# Patient Record
Sex: MALE | Race: BLACK OR AFRICAN AMERICAN | NOT HISPANIC OR LATINO | URBAN - METROPOLITAN AREA
[De-identification: names, ages, dates, MRNs, and addresses within clinical notes are randomized per-mention and may not be internally consistent; named-entity substitution may affect disease eponyms.]

---

## 2017-08-13 ENCOUNTER — EMERGENCY (EMERGENCY)
Facility: HOSPITAL | Age: 52
LOS: 0 days | Discharge: HOME | End: 2017-08-14
Attending: EMERGENCY MEDICINE
Payer: MEDICAID

## 2017-08-13 VITALS
WEIGHT: 240.08 LBS | TEMPERATURE: 98 F | HEIGHT: 71 IN | OXYGEN SATURATION: 100 % | HEART RATE: 71 BPM | RESPIRATION RATE: 17 BRPM | SYSTOLIC BLOOD PRESSURE: 166 MMHG | DIASTOLIC BLOOD PRESSURE: 94 MMHG

## 2017-08-13 PROCEDURE — 99282 EMERGENCY DEPT VISIT SF MDM: CPT | Mod: 25

## 2017-08-13 NOTE — ED ADULT TRIAGE NOTE - CHIEF COMPLAINT QUOTE
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

## 2017-08-14 RX ORDER — OXYMETAZOLINE HYDROCHLORIDE 0.5 MG/ML
1 SPRAY NASAL ONCE
Qty: 0 | Refills: 0 | Status: COMPLETED | OUTPATIENT
Start: 2017-08-14 | End: 2017-08-14

## 2017-08-14 RX ORDER — LORATADINE 10 MG/1
1 TABLET ORAL
Qty: 5 | Refills: 0 | OUTPATIENT
Start: 2017-08-14

## 2017-08-14 RX ORDER — LORATADINE 10 MG/1
10 TABLET ORAL ONCE
Qty: 0 | Refills: 0 | Status: COMPLETED | OUTPATIENT
Start: 2017-08-14 | End: 2017-08-14

## 2017-08-14 RX ADMIN — OXYMETAZOLINE HYDROCHLORIDE 1 SPRAY(S): 0.5 SPRAY NASAL at 00:53

## 2017-08-14 RX ADMIN — LORATADINE 10 MILLIGRAM(S): 10 TABLET ORAL at 00:52

## 2017-08-14 NOTE — ED PROVIDER NOTE - MEDICAL DECISION MAKING DETAILS
Will treat for viral URI, sinus congestion.  VSS.  Supportive care with fluids, decongestants for now.  No antibiotics since strong suspicion for viral, patient well appearing, and symptoms for <2 days.  Discussed results and outcome of today's visit with the patient.  Patient advised to please follow up with their primary care doctor within the next 24 hours and return to the Emergency Department for worsening symptoms or any other concerns.  Patient advised that their doctor may call  to follow up on the specific results of the tests performed today in the emergency department.   Patient appears well on discharge.  Patient given prescription medications for their condition and advised to take them as prescribed and check with their Primary Care Provider if any questions arise.

## 2017-08-14 NOTE — ED PROVIDER NOTE - OBJECTIVE STATEMENT
Pertinent PMH/PSH/FHx/SHx and Review of Systems contained within:  Patient presents to the ED for sinus congestion and runny nose x2 days.  Well appearing, denies sore throat, cough, shortness of breath.  Having trouble laying flat because "its hard to breathe through my nose!"  Works in health care, has frequent sick contacts.    Relevant PMHx/SHx/SOCHx/FAMH:  HTN  +Occasional Smoker, denies use of other illict drugs or h/o alchol abuse  PMD: can't recall name    ROS: No fever/chills, No headache/photophobia/eye pain/changes in vision, No ear pain/sore throat/dysphagia, No chest pain/palpitations, no SOB/cough/wheeze/stridor, No abdominal pain, No N/V/D/melena, no dysuria/frequency/discharge, No neck/back pain, no rash, no changes in neurological status/function.

## 2017-08-15 DIAGNOSIS — R05 COUGH: ICD-10-CM

## 2017-08-15 DIAGNOSIS — Z20.89 CONTACT WITH AND (SUSPECTED) EXPOSURE TO OTHER COMMUNICABLE DISEASES: ICD-10-CM

## 2017-08-15 DIAGNOSIS — R09.81 NASAL CONGESTION: ICD-10-CM

## 2018-02-14 ENCOUNTER — EMERGENCY (EMERGENCY)
Facility: HOSPITAL | Age: 53
LOS: 0 days | Discharge: HOME | End: 2018-02-14
Attending: EMERGENCY MEDICINE
Payer: MEDICAID

## 2018-02-14 VITALS
OXYGEN SATURATION: 99 % | DIASTOLIC BLOOD PRESSURE: 74 MMHG | HEART RATE: 82 BPM | SYSTOLIC BLOOD PRESSURE: 162 MMHG | HEIGHT: 71 IN | TEMPERATURE: 102 F | WEIGHT: 240.08 LBS | RESPIRATION RATE: 18 BRPM

## 2018-02-14 VITALS
TEMPERATURE: 99 F | HEART RATE: 84 BPM | SYSTOLIC BLOOD PRESSURE: 144 MMHG | DIASTOLIC BLOOD PRESSURE: 84 MMHG | OXYGEN SATURATION: 99 % | RESPIRATION RATE: 18 BRPM

## 2018-02-14 DIAGNOSIS — I10 ESSENTIAL (PRIMARY) HYPERTENSION: ICD-10-CM

## 2018-02-14 DIAGNOSIS — J11.1 INFLUENZA DUE TO UNIDENTIFIED INFLUENZA VIRUS WITH OTHER RESPIRATORY MANIFESTATIONS: ICD-10-CM

## 2018-02-14 DIAGNOSIS — R05 COUGH: ICD-10-CM

## 2018-02-14 LAB
FLUAV SPEC QL CULT: NEGATIVE — SIGNIFICANT CHANGE UP
FLUBV AG SPEC QL IA: POSITIVE

## 2018-02-14 PROCEDURE — 71046 X-RAY EXAM CHEST 2 VIEWS: CPT | Mod: 26

## 2018-02-14 PROCEDURE — 99284 EMERGENCY DEPT VISIT MOD MDM: CPT

## 2018-02-14 RX ORDER — IBUPROFEN 200 MG
1 TABLET ORAL
Qty: 20 | Refills: 0 | OUTPATIENT
Start: 2018-02-14

## 2018-02-14 RX ORDER — IBUPROFEN 200 MG
600 TABLET ORAL ONCE
Qty: 0 | Refills: 0 | Status: COMPLETED | OUTPATIENT
Start: 2018-02-14 | End: 2018-02-14

## 2018-02-14 RX ADMIN — Medication 600 MILLIGRAM(S): at 20:57

## 2018-02-14 RX ADMIN — Medication 600 MILLIGRAM(S): at 19:51

## 2018-02-14 RX ADMIN — Medication 75 MILLIGRAM(S): at 19:51

## 2018-02-14 NOTE — ED ADULT NURSE NOTE - OBJECTIVE STATEMENT
received ft c/o fever/chills since monday with body aches and cough nasal congestion lungs clear b/l

## 2018-02-14 NOTE — ED PROVIDER NOTE - PROGRESS NOTE DETAILS
Results reported to patient--+ flu  Pt. reports feeling better after meds  pt. agrees to f/u with primary care outpt.  pt. understands to return to ED if symptoms worsen; will d/c

## 2018-02-14 NOTE — ED PROVIDER NOTE - PHYSICAL EXAMINATION
Vitals: febrile 101.7, otherwise wnl, mild htn  Gen: AAOx3, NAD, sitting comfortably in stretcher  Head: ncat, perrla, eomi b/l  Neck: supple, no lymphadenopathy, no midline deviation  Heart: rrr, no m/r/g  Lungs: CTA b/l, no rales/ronchi/wheezes  Abd: soft, nontender, non-distended, no rebound or guarding  Ext: no clubbing/cyanosis/edema  Neuro: sensation and muscle strength intact b/l, steady gait

## 2018-02-15 LAB
FLUBV RNA SPEC QL NAA+PROBE: DETECTED
RAPID RVP RESULT: DETECTED

## 2019-01-13 ENCOUNTER — EMERGENCY (EMERGENCY)
Facility: HOSPITAL | Age: 54
LOS: 0 days | Discharge: HOME | End: 2019-01-13
Attending: EMERGENCY MEDICINE
Payer: MEDICAID

## 2019-01-13 VITALS
SYSTOLIC BLOOD PRESSURE: 167 MMHG | RESPIRATION RATE: 16 BRPM | OXYGEN SATURATION: 100 % | DIASTOLIC BLOOD PRESSURE: 97 MMHG | HEART RATE: 70 BPM

## 2019-01-13 VITALS
DIASTOLIC BLOOD PRESSURE: 96 MMHG | HEART RATE: 69 BPM | HEIGHT: 71 IN | RESPIRATION RATE: 17 BRPM | OXYGEN SATURATION: 100 % | TEMPERATURE: 98 F | SYSTOLIC BLOOD PRESSURE: 180 MMHG | WEIGHT: 250 LBS

## 2019-01-13 DIAGNOSIS — M10.9 GOUT, UNSPECIFIED: ICD-10-CM

## 2019-01-13 DIAGNOSIS — M25.562 PAIN IN LEFT KNEE: ICD-10-CM

## 2019-01-13 DIAGNOSIS — I10 ESSENTIAL (PRIMARY) HYPERTENSION: ICD-10-CM

## 2019-01-13 PROCEDURE — 99283 EMERGENCY DEPT VISIT LOW MDM: CPT | Mod: 25

## 2019-01-13 PROCEDURE — 73562 X-RAY EXAM OF KNEE 3: CPT | Mod: 26,LT

## 2021-04-30 NOTE — ED ADULT TRIAGE NOTE - MODE OF ARRIVAL
Patient Education   Patient Education     Understanding Rotator Cuff Tendonitis    Tendons are tough tissues that connect muscles to bone. A group of 4 muscles and their tendons form a “cuff” around the head of the upper arm bone. This is called the rotator cuff. It connects the upper arm to the shoulder blade. It gives the shoulder joint stability and strength.  If tendons are injured or strained, they may become irritated and swollen (inflamed). This is called tendonitis. Rotator cuff tendonitis may cause shoulder pain and loss of function.  What causes rotator cuff tendonitis?  Tendonitis results when the rotator cuff tendons are injured or overworked. The most common cause of injury is repetitive overhead activities. These can be work-related activities such as reaching, pushing, or lifting. Or they can be sports-related activities such as throwing, swimming, or lifting weights.  Symptoms of rotator cuff tendonitis  Pain on the side of the upper arm is the most common symptom. Pain may get worse with overhead movements or when you raise the arm above shoulder level. It may also hurt to lie on the shoulder at night.  Treatment for rotator cuff tendonitis  Treatment may include the following:  · Active rest. This lets the rotator cuff heal. Active rest means using your arm and shoulder, but avoiding activities that cause pain, such as reaching overhead or sleeping on the shoulder.  · Cold packs. Putting ice packs on the shoulder helps reduce swelling and relieve pain.  · Pain medicines. Prescription or over-the-counter pain medicines can help relieve pain and swelling.  · Arm and shoulder exercises. These help keep the shoulder joint mobile as it heals. They also help improve the strength of muscles around the joint.  Possible complications  It might be tempting to stop using your shoulder completely to avoid pain. But doing so may lead to a condition called “frozen shoulder.” To help prevent this, following  instructions you are given for active rest and for doing exercises to help your shoulder heal.  When to call your healthcare provider  Call your healthcare provider right away if you have any of these:  · Fever of 100.4°F (38°C) or higher, or as directed  · Symptoms that don’t get better, or get worse  · New symptoms   Date Last Reviewed: 3/10/2016  © 3244-6082 [employer]. [address] [place] [address], [place] [address]. All rights reserved. This information is not intended as a substitute for professional medical care. Always follow your healthcare professional's instructions.           Rotator Cuff Tear  The rotator cuff is a group of muscles and tendons that surround the shoulder joint. These muscles and tendons hold the arm in its joint. They also help the shoulder to rotate. The rotator cuff can be torn from overuse or injury. Gradual wear and tear can lead to inflammation of these tendons. This can progress to gradual or sudden tears.  Symptoms of a torn rotator cuff include:  · Shoulder pain that gets worse when you raise your arm overhead  · Weakness of the shoulder muscles with overhead activity  · Popping and clicking when you move your shoulder  · Shoulder pain that wakes you up at night when sleeping on the hurt shoulder  Diagnosis is made by an MRI or arthroscopy. This is a surgical procedure to look inside the joint through a small tube. Partial rotator cuff tears can be treated by first resting, then strengthening the rotator cuff muscles.  Anti-inflammatory medicines, such as ibuprofen or naproxen, are useful. A limited number of steroid injections can be given. Surgery may be recommended for complete tears and partial tears that do not respond to medical treatment.  Home care  · Avoid activities that make your pain worse. This includes overhead activities, doing the same motion over and over, and heavy lifting.  · You may use over-the-counter pain medicines to control pain, unless another  medicine was prescribed. If you have chronic liver or kidney disease or ever had a stomach ulcer or GI bleeding, talk with your healthcare provider before using these medicines.  · If you were given a sling, use it for comfort. After your pain decreases, don’t keep your arm in the sling all the time. Take your arm out several times a day and move the shoulder joint, as you are able.  · Your healthcare provider may recommend gentle pendulum exercises. Stand or sit with your arm vertical and close to your side. Relax your shoulder muscles and gently swing the arm forward and back, side to side, and in small circles for about 5 minutes. Do this once or twice a day. There should be only slight pain with this exercise.  · You may benefit from physical therapy or a home exercise program to strengthen your shoulder muscles. This will also increase your pain-free range of motion. Applying heat prior to exercises can help prepare the muscles and joint for activity. Talk to your healthcare provider about what is best for your condition.  Follow-up care  Follow up with your healthcare provider, or as advised.  When to seek medical advice  Call your healthcare provider right away if any of the following occur:  · Increasing shoulder pain  · Rapid swelling in the involved shoulder or arm  · Numbness, tingling, or pain radiating down the arm to the hand  · Loss of strength in the affected arm  Date Last Reviewed: 11/23/2015  © 7022-2456 [employer]. [address] [place] [address], [place] [address]. All rights reserved. This information is not intended as a substitute for professional medical care. Always follow your healthcare professional's instructions.            Walk in

## 2021-11-30 NOTE — ED ADULT NURSE NOTE - NS ED NOTE ABUSE RESPONSE YN
Subjective: I injured my LSF.       Objective:     Current level of performance:  ADL: Independent  Work: [employer]   Leisure: Not addressed    Measurements/Tests:  ROM:         N/A         Treatment Provided this day: Fabricated a LSF extension splint pe Yes

## 2023-07-06 NOTE — ED ADULT TRIAGE NOTE - MODE OF ARRIVAL
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           



                                                  tive:           



                                                  [facility]           



                                                  Therapy           



                                                  for            



                                                  Suspected           



                                                  Infection<           



                                                  br>[facility]           



                                                  Therapy           



                                                  Site:           



                                                  Blood<br>D           



                                                  uration of           



                                                  therapy:           



                                                  72 hours           

 

     pantoprazol            Yes            40mg      40 mg,           Univ

ers



     e         3-22                               Oral, BID,           ity of



     (PROTONIX)      01:00:                               First dose           [facility]

[facility]



     EC tablet      00                                 (after           Medical



     40 mg                                         last           Branch



                                                  modificati           



                                                  on) on Tue           



                                                  3/21/23 at           



                                                  2000,           



                                                  Until           



                                                  Discontinu           



                                                  ed,            



                                                  Routine           

 

     ceFEPIme      2023- No             2000mg      2,000 mg,           U

nivers



     (MAXIPIME)      3-21 03-22                          IV             ity of



     2,000 mg in      23:30: 02:42                          [place], [place]



     NaCl 0.9%      00   :00                           ONCE, 1           Medical



     (NS) 100 mL                                         dose, On           [facility]





     MINI-BAG                                         Tue            



                                                  3/21/23 at           



                                                  1830,           



                                                  Administer           



                                                  over 30           



                                                  Minutes,           



                                                  100            



                                                  mL<br>Reas           



                                                  on for           



                                                  Anti-Infec           



                                                  tive:           



                                                  [facility]           



                                                  Therapy           



                                                  for            



                                                  Suspected           



                                                  Infection<           



                                                  br>[facility]           



                                                  Therapy           



                                                  Site:           



                                                  Blood<br>D           



                                                  uration of           



                                                  therapy:           



                                                  72 hours           

 

     thiamine      0      Yes            100mg      100 mg,           Unive

rs



     (VITAMIN      3-21                               Oral,           ity of



     B1) tablet      14:00:                               DAILY,           [place]



     100 mg      00                                 First dose           Medical



                                                  on Tue           Branch



                                                  3/21/23 at           



                                                  0900,           



                                                  Until           



                                                  Discontinu           



                                                  ed,            



                                                  Routine           

 

     magnesium      2023- No             2g        2 g, IV           Univ

ers



     sulfate in      3-21 03-21                          Piggyback,           it

y of



     water 2      10:45: 11:26                          Administer           [facility]

as



     gram/50 mL      00   :00                           over 60           Medica

l



     (4 %)                                         Minutes,           Branch



     infusion 2                                         ONCE, 1           



     g                                            dose, On           



                                                  Tue            



                                                  3/21/23 at           



                                                  0545,           



                                                  Routine           

 

     sulfur      2023- No        [id number] 5mL       5 mL,           Univ

ers



     hexafluorid      3-20 03-20                          Intravenou           i

ty of



     e microsphr      17:15: 17:15                          s, ONCE, 1          

 [place]



     (LUMASON)      00   :00                           dose, On           Medica

l



     injection 5                                         Mon            Branch



     mL                                           3/20/23 at           



                                                  1215,           



                                                  Routine<br           



                                                  >Faculty           



                                                  member           



                                                  approving           



                                                  Restricted           



                                                  medication           



                                                  : BEAR DE ANDA           

 

     lactulose      0      Yes            30mL      30 mL,           Univer

s



     (CEPHULAC)      3-20                               Oral,           ity of



     solution 30      14:00:                               DAILY,           [place]

[place]



     mL        00                                 First dose           Medical



                                                  (after           Branch



                                                  last           



                                                  modificati           



                                                  on) on Mon           



                                                  3/20/23 at           



                                                  0900,           



                                                  Until           



                                                  Discontinu           



                                                  ed,            



                                                  Routine           

 

     pantoprazol      2023- No             40mg      40 mg,           Uni

vers



     e         3-20 03-21                          Slow IV           ity of



     (PROTONIX)      14:00: 16:41                          Push,           [place]



     injection      00   :07                           DAILY,           Medical



     40 mg                                         First dose           Branch



                                                  (after           



                                                  last           



                                                  modificati           



                                                  on) on Mon           



                                                  3/20/23 at           



                                                  0900,           



                                                  Until           



                                                  Discontinu           



                                                  ed             

 

     acetaminoph            Yes            650mg      650 mg,           Un

sangeetha



     en        3-20                               Oral,           ity of



     (TYLENOL)      05:25:                               Q6HPRN,           [place]



     tablet 650      45                                 Starting           Medic

al



     mg                                           on Mon           Branch



                                                  3/20/23 at           



                                                  0025,           



                                                  Until           



                                                  Discontinu           



                                                  ed,            



                                                  Routine,           



                                                  Temp > 38           



                                                  C, Pain           



                                                  (scale           



                                                  4-6), Pain           



                                                  (scale           



                                                  1-3)           

 

     pantoprazol      2023- No             40mg      40 mg,           Uni

vers



     e         3-20 03-20                          Slow IV           ity of



     (PROTONIX)      01:00: 05:30                          Push,           [place]



     injection      00   :37                           Q12H,           Medical



     40 mg                                         First dose           Branch



                                                  on Sun           



                                                  3/19/23 at           



                                                  2000,           



                                                  Until           



                                                  Discontinu           



                                                  ed             

 

     foLIC acid            Yes            1mg       1 mg,           Univer

s



     (FOLATE)      3-19                               Oral,           ity of



     tablet 1 mg      14:00:                               DAILY,           [place]

[place]



               00                                 First dose           Medical



                                                  on Sun           Branch



                                                  3/19/23 at           



                                                  0900,           



                                                  Until           



                                                  Discontinu           



                                                  ed,            



                                                  Routine           

 

     phytonadion      0 - No             10mg      IV             Unive

rs



     e (VITAMIN      3-19 03-20                          Piggyback,           it

y of



     K) 10 mg in      14:00: 14:54                          DAILY, 2           T

[facility]



     NaCl 0.9%      00   :00                           doses,           Medical



     (NS)                                         First dose           Branch



     piggyback                                         (after           



                                                  last           



                                                  reorder)           



                                                  on Sun           



                                                  3/19/23 at           



                                                  0900, Last           



                                                  dose on           



                                                  Mon            



                                                  3/20/23 at           



                                                  0900, 50           



                                                  mL             

 

     lactated      2023- No             1000mL      at 100           Univ

ers



     ringers IV      3-19 03-21                          mL/hr,           ity of



     infusion      12:15: 01:32                          1,000 mL,           [place]

as



     1,000 mL      00   :43                           IV             Medical



                                                  Infusion,           [facility]



                                                  CONTINUOUS           



                                                  , Starting           



                                                  on Sun           



                                                  3/19/23 at           



                                                  0715,           



                                                  Until Mon           



                                                  3/20/23 at           



                                                  203,           



                                                  Routine           

 

     meropenem      2023- No             1000mg      1,000 mg,           

[facility]



     (MERREM)      3-19 03-21                          IV             ity of



     1,000 mg in      11:00: 13:11                          Piggyback,          

 [place]



     NaCl 0.9%      00   :57                           Q8H ABX,           Medica

l



     (NS) 100 mL                                         15 doses,           [facility]



     MINI-BAG                                         First dose           



                                                  on Sun           



                                                  3/19/23 at           



                                                  0600, Last           



                                                  dose on           



                                                  Thu            



                                                  3/23/23 at           



                                                  2200,           



                                                  Administer           



                                                  over 3           



                                                  Hours, 100           



                                                  mL<br>Rest           



                                                  ricted use           



                                                  approved           



                                                  by: [address]            



                                                  FLOOR<br&g           



                                                  t;Reason           



                                                  for            



                                                  Anti-Infec           



                                                  tive:           



                                                  [facility]           



                                                  Therapy           



                                                  for            



                                                  Suspected           



                                                  Infection<           



                                                  br>[facility]           



                                                  Therapy           



                                                  Site:           



                                                  Blood<br>D           



                                                  uration of           



                                                  therapy:           



                                                  72 hours           

 

     NORepinephr      2023- No             .05ug/k      0.05-0.5         

  [facility]



     ine       3-19 03-20                g/min      mcg/kg/min           ity of



     (LEVOPHED)      03:29: 03:28                          ?77.1 kg           [facility]

[facility]



     4 mg in      15   :15                           (14.4563-1           Medica

l



     NaCl 0.9%                                         44.5625           Branch



     (NS) 250 mL                                         mL/hr,           



     infusion                                         rounded to           



                                                  14..           



                                                  56 mL/hr),           



                                                  IV             



                                                  Infusion,           



                                                  TITRATE,           



                                                  MAP Goal >           



                                                  or = 65           



                                                  mmHg,           



                                                  Starting           



                                                  on Sat           



                                                  3/18/23 at           



                                                  2229, For           



                                                  24             



                                                  hours<br>I           



                                                  nitiate           



                                                  titration           



                                                  at 0.05           



                                                  mcg/kg/min           



                                                  .&nbsp;&nb           



                                                  sp;Increas           



                                                  e by 0.01           



                                                  mcg/kg/min           



                                                  every 30           



                                                  seconds to           



                                                  5 minutes           



                                                  as needed           



                                                  to reach           



                                                  and            



                                                  maintain           



                                                  goal blood           



                                                  pressure.&           



                                                  nbsp;&nbsp           



                                                  ;Maximum           



                                                  dose = 0.5           



                                                  mcg/kg/min           



                                                  .&nbsp;&nb           



                                                  sp;If goal           



                                                  not            



                                                  maintained           



                                                  at maximum           



                                                  allowed           



                                                  dose,           



                                                  contact           



                                                  prescriber           



                                                  .&nbsp;&nb           



                                                  sp;Adminis           



                                                  ter only           



                                                  one            



                                                  peripheral           



                                                  intravenou           



                                                  s              



                                                  vasopresso           



                                                  r at a           



                                                  time.<br>           

 

     vancomycin      2023- No             15mg/kg      1,250 mg          

 [facility]



     1,250 mg in      3-19 03-20                          (rounded           ity

 of



     NaCl 0.9%      03:00: 05:12                          from           [place]



     (NS) 250 mL      00   :19                           1,156.5 mg           Me

dical



     VIAL-MATE                                         = 15 mg/kg           [facility]

ch



     IV                                           ?77.1 kg),           



     piggyback                                         IV             



                                                  Piggyback,           



                                                  Q12H ABX,           



                                                  10 doses,           



                                                  First dose           



                                                  on Sat           



                                                  3/18/23 at           



                                                  2200, Last           



                                                  dose on           



                                                  Thu            



                                                  3/23/23 at           



                                                  1000,           



                                                  Administer           



                                                  over 90           



                                                  Minutes,           



                                                  250            



                                                  mL<br&gt;R           



                                                  crystal for           



                                                  Anti-Infec           



                                                  tive:           



                                                  [facility]           



                                                  Therapy           



                                                  for            



                                                  Suspected           



                                                  Infection<           



                                                  br>[facility]           



                                                  Therapy           



                                                  Site:           



                                                  Blood<br>D           



                                                  uration of           



                                                  therapy:           



                                                  72 hours           

 

     meropenem      2023- No             1000mg      1,000 mg,           

[facility]



     (MERREM)      3-19 03-19                          IV             ity of



     1,000 mg in      03:00: 04:27                          [place], [place]



     NaCl 0.9%      00   :00                           ONCE, 1           Medical



     (NS) 100 mL                                         dose, On           [facility]

ch



     MINI-BAG                                         Sat            



                                                  3/18/23 at           



                                                  2200,           



                                                  Administer           



                                                  over 30           



                                                  Minutes,           



                                                  100            



                                                  mL<br>Rest           



                                                  ricted use           



                                                  approved           



                                                  by: [facility]           



                                                  8TH            



                                                  FLOOR<br>R           



                                                  crystal for           



                                                  Anti-Infec           



                                                  tive:           



                                                  Empiric           



                                                  Therapy           



                                                  for            



                                                  Suspected           



                                                  Infection<           



                                                  br>Empiric           



                                                  Therapy           



                                                  Site:           



                                                  Blood<br>D           



                                                  uration of           



                                                  therapy:           



                                                  72 hours           

 

     NaCl 0.9%      2023- No             1000mL      at 999           Uni

vers



     (NS) bolus      3-19 03-19                          mL/hr,           ity of



     infusion      02:52: 12:02                          1,000 mL,           [place]

as



     1,000 mL      00   :28                           IV             Medical



                                                  Piggyback,           Branch



                                                  ONCE, 1           



                                                  dose, On           



                                                  Sat            



                                                  3/18/23 at           



                                                  2200, ASAP           

 

     lactulose      2023- No             30mL      30 mL,           Unive

rs



     (CEPHULAC)      3-19 03-20                          Oral, TID,           it

y of



     solution 30      02:15: 11:21                          First dose          

 [place]



     mL        00   :53                           (after           Medical



                                                  last           Branch



                                                  modificati           



                                                  on) on Sat           



                                                  3/18/23 at           



                                                  ,           



                                                  Until           



                                                  Discontinu           



                                                  ed,            



                                                  Routine           

 

     cefTRIAXone      2023- No             1000mg      1,000 mg,         

  [facility]



     (ROCEPHIN)      3-19 03-19                          IV             ity of



     1,000 mg in      01:15: 02:19                          Piggyback,          

 [place]



     NaCl 0.9%      00   :20                           Q24H ABX,           Medic

al



     (NS) 100 mL                                         5 doses,           [facility]

ch



     MINI-BAG                                         First dose           



                                                  on Sat           



                                                  3/18/23 at           



                                                  , Last           



                                                  dose on           



                                                  Wed            



                                                  3/22/23 at           



                                                  ,           



                                                  Administer           



                                                  over 30           



                                                  Minutes,           



                                                  100            



                                                  mL<br>Reas           



                                                  on for           



                                                  Anti-Infec           



                                                  tive:           



                                                  Documented           



                                                  Infection<           



                                                  br>Documen           



                                                  michelle            



                                                  Infection           



                                                  Site:           



                                                  Abdominal<           



                                                  br>Duratio           



                                                  n of           



                                                  Therapy: 7           



                                                  days           

 

     lactulose       No             30mL      30 mL,           Unive

rs



     (CEPHULAC)      3-19 03-19                          Oral, BID,           it

y of



     solution 30      01:00: 02:10                          First dose          

 [place]



     mL        00   :44                           on Sat           Medical



                                                  3/18/23 at           Branch



                                                  ,           



                                                  Until           



                                                  Discontinu           



                                                  ed,            



                                                  Routine           

 

     potassium       No             20meq      20 mEq, IV           

[facility]



     chloride in      3-19 03-19                          Piggyback,           i

ty of



     water (KCL)      01:00: 06:44                          Q2H, 2           [facility]

as



     20 mEq/100      00   :00                           doses,           Medical



     mL RTU IVPB                                         First dose           Br

anch



     20 mEq                                         on Sat           



                                                  3/18/23 at           



                                                  , Last           



                                                  dose on           



                                                  Sat            



                                                  3/18/23 at           



                                                  , 100           



                                                  mL             

 

     phytonadion      2023- No             10mg      IV             Unive

rs



     e (VITAMIN      3-19 03-19                          Piggyback,           it

y of



     K) 10 mg in      00:30: 03:50                          ONCE, 1           Te

[place]



     NaCl 0.9%      00   :00                           dose, On           Medica

l



     (NS)                                         Sat            Branch



     piggyback                                         3/18/23 at           



                                                  1930, 50           



                                                  mL             

 

     thiamine      2023- No             100mg      IV             [facility]



     (VITAMIN      3-18 03-20                          Piggyback,           ity 

of



     B1) 100 mg      23:30: 11:22                          DAILY,           [place]

[place]



     in NaCl      00   :30                           First dose           Medica

l



     0.9% (NS)                                         on Sat           Branch



     piggyback                                         3/18/23 at           



                                                  1830,           



                                                  Until           



                                                  Discontinu           



                                                  ed, 50 mL           

 

     oxazepam      0      Yes            15mg      15 mg,           [facility]



     (SERAX)      3-18                               Oral,           ity of



     capsule 15      23:24:                               Q4HPRN,           [place]

s



     mg        04                                 Starting           Medical



                                                  on Sat           Branch



                                                  3/18/23 at           



                                                  1824,           



                                                  Until           



                                                  Discontinu           



                                                  ed,            



                                                  Routine,           



                                                  Only while           



                                                  awake for           



                                                  DBP equal           



                                                  to or           



                                                  greater           



                                                  than 100,           



                                                  HR equal           



                                                  to or           



                                                  greater           



                                                  than 100.           

 

     iopamidol       2023- No        [id number] 100mL      100 mL,         

  [facility]



     (ISOVUE      3-18 03-18                          Intravenou           ity o

f



     370-500 mL)      22:05: 22:05                          s, ONCE, 1          

 [place]



     injection      00   :00                           dose, On           Medica

l



     100 mL                                         Sat            Branch



                                                  3/18/23 at           



                                                  1730,           



                                                  Routine           

 

     spironolact            Yes       Alcoholic 50mg QD   Take 1          

 [facility]



     one       7-15                cirrhosis           [facility]           [facility]

h



     (ALDACTONE)      00:00:                of liver           mouth           



     50 mg      00                  with           daily           



     tablet                          ascites                          

 

     dexlansopra      0      Yes       Secondary 30mg Q.5D Take 1          

 [facility]



     zole      7-15                esophageal           capsule by           [facility]



     (DEXILANT)      00:00:                varices           mouth 2           



     30 mg      00                  without           times           



     delayed                          bleeding           daily           



     release                                                        



     capsule                                                        

 

     furosemide      0      Yes       Leg  20mg QD   Take 1           [facility]

s



     (LASIX) 20      7-15                swelling           tablet by           

Health



     mg tablet      00:00:                               mouth           



               00                                 daily           

 

     rifAXIMin            Yes                      Take 1           [facility]



     (XIFAXAN)      7-15                               tablet by           Healt

h



     550 mg      00:00:                               mouth           



     tablet      00                                 twice           



                                                  daily.           



                                                  Therapeuti           



                                                  c              



                                                  substituti           



                                                  on for           



                                                  xifaxan           



                                                  200mg per           



                                                  P&T            

 

     spironolact            Yes       Alcoholic 50mg QD   Take 1          

 [facility]



     one       7-15                cirrhosis           tablet by           Healt

h



     (ALDACTONE)      00:00:                of liver           mouth           



     50 mg      00                  with           daily           



     tablet                          ascites                          

 

     dexlansopra      0      Yes       Secondary 30mg Q.5D Take 1          

 [facility]



     zole      7-15                esophageal           capsule by           [facility]



     (DEXILANT)      00:00:                varices           mouth 2           



     30 mg      00                  without           times           



     delayed                          bleeding           daily           



     release                                                        



     capsule                                                        

 

     furosemide      0      Yes       Leg  20mg QD   Take 1           [facility]

s



     (LASIX) 20      7-15                swelling           tablet by           

Health



     mg tablet      00:00:                               mouth           



               00                                 daily           

 

     rifAXIMin      0      Yes                      Take 1           [facility]



     (XIFAXAN)      7-15                               tablet by           Healt





     550 mg      00:00:                               mouth           



     tablet      00                                 twice           



                                                  daily.           



                                                  Therapeuti           



                                                  c              



                                                  substituti           



                                                  on for           



                                                  xifaxan           



                                                  200mg per           



                                                  P&T            

 

     spironolact      0      Yes       Alcoholic 50mg QD   Take 1          

 [facility]



     one       7-15                cirrhosis           tablet by           [facility]





     (ALDACTONE)      00:00:                of liver           mouth           



     50 mg      00                  with           daily           



     tablet                          ascites                          

 

     dexlansopra      0      Yes       Secondary 30mg Q.5D Take 1          

 [facility]



     zole      7-15                esophageal           capsule by           [facility]



     (DEXILANT)      00:00:                varices           mouth 2           



     30 mg      00                  without           times           



     delayed                          bleeding           daily           



     release                                                        



     capsule                                                        

 

     furosemide            Yes       Leg  20mg QD   Take 1           [facility]

s



     (LASIX) 20      7-15                swelling           tablet by           

Health



     mg tablet      00:00:                               mouth           



               00                                 daily           

 

     rifAXIMin      0      Yes                      Take 1           [facility]



     (XIFAXAN)      7-15                               tablet by           Healt





     550 mg      00:00:                               mouth           



     tablet      00                                 twice           



                                                  daily.           



                                                  Therapeuti           



                                                  c              



                                                  substituti           



                                                  on for           



                                                  xifaxan           



                                                  200mg per           



                                                  P&T            

 

     Dexlansopra      0      Yes            30mg      Take 1           Univ

ers



     zole 30 mg      7-15                               capsule by           ity

 of



     capsule      00:00:                               mouth.           [address]



                                                                 Branch

 

     rifAXIMin      -0      Yes                      Take by           Unive

rs



     (XIFAXAN)      7-15                               mouth.           ity of



     550 mg      00:00:                                              [place]



     tablet                                                      Medical



                                                                 Branch

 

     Dexlansopra      -0      Yes            30mg      Take 1           Univ

ers



     zole 30 mg      7-15                               capsule by           ity

 of



     capsule      00:00:                               mouth.           [address]



                                                                 Branch

 

     rifAXIMin      -0      Yes                      Take by           Unive

rs



     (XIFAXAN)      7-15                               mouth.           ity of



     550 mg      00:00:                                              [place]



     tablet                                                      Medical



                                                                 Branch

 

     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        

   [facility]



     (XIFAXAN)      7-15 09-21           to             tablets by           [facility]

lt



     200 mg      00:00: 00:00           alcoholic           mouth 2           



     tablet      00   :00            cirrhosis           times           



                                                  daily           



                                                  (Therapeut           



                                                  ic             



                                                  substituti           



                                                  on from           



                                                  Xifaxan           



                                                  550mg per           



                                                  Pharmacy           



                                                  P&amp;T.)           

 

     rifAXIMin      2022- No        Ascites due 600mg Q.5D Take 3        

   [facility]



     (XIFAXAN)      7-15 09-21           to             tablets by           [facility]

lt



     200 mg      00:00: 00:00           alcoholic           mouth 2           



     tablet      00   :00            cirrhosis           times           



                                                  daily           



                                                  (Therapeut           



                                                  ic             



                                                  substituti           



                                                  on from           



                                                  Xifaxan           



                                                  550mg per           



                                                  Pharmacy           



                                                  P&amp;T.)           

 

     rifAXIMin      2022- No        Ascites due 600mg Q.5D Take 3        

   [facility]



     (XIFAXAN)      7-15 09-21           to             tablets by           [facility]

lt



     200 mg      00:00: 00:00           alcoholic           mouth 2           



     tablet      00   :00            cirrhosis           times           



                                                  daily           



                                                  (Therapeut           



                                                  ic             



                                                  substituti           



                                                  on from           



                                                  Xifaxan           



                                                  550mg per           



                                                  Pharmacy           



                                                  P&amp;T.)           

 

     lactulose      2022- No        Alcoholic 10g       Take 15 mL       

    [facility]



     (CHRONULAC)      7-15 08-14           cirrhosis           by mouth 3       

    Health



     10 gram/15      00:00: 23:59           of liver           times           



     mL oral      00   :00            with           daily for           



     solution                          ascites           30 days           

 

     lactulose      2022- No        Alcoholic 10g       Take 15 mL       

    [facility]



     (CHRONULAC)      7-15 08-14           cirrhosis           by mouth 3       

    Health



     10 gram/15      00:00: 23:59           of liver           times           



     mL oral      00   :00            with           daily for           



     solution                          ascites           30 days           

 

     lactulose      2022- No        Alcoholic 10g       Take 15 mL       

    [facility]



     (CHRONULAC)      7-15 08-14           cirrhosis           by mouth 3       

    Health



     10 gram/15      00:00: 23:59           of liver           times           



     mL oral      00   :00            with           daily for           



     solution                          ascites           30 days           

 

     furosemide            Yes       [id number] 40mg      Take 1           U

nivers



     40 mg      7-06                               tablet by           ity of



     tablet      00:00:                               mouth           [place]



               00                                 daily.           Medical



                                                                 Branch

 

     spironolact            Yes       [id number] 100mg      Take 1          

 [facility]



     one 100 mg      7-06                               tablet by           ity 

of



     tablet      00:00:                               mouth           [place]



               00                                 daily.           Medical



                                                                 Branch

 

     furosemide            Yes       [id number] 40mg      Take 1           U

nivers



     40 mg      7-06                               tablet by           ity of



     tablet      00:00:                               mouth           [place]



               00                                 daily.           Medical



                                                                 Branch

 

     spironolact            Yes       [id number] 100mg      Take 1          

 [facility]



     one 100 mg      7-06                               tablet by           ity 

of



     tablet      00:00:                               mouth           [place]



               00                                 daily.           Medical



                                                                 Branch

 

     furosemide            Yes       [id number] 40mg      Take 1           U

nivers



     40 mg      7-06                               tablet by           ity of



     tablet      00:00:                               mouth           [place]



               00                                 daily.           [facility]



                                                                 Branch

 

     spironolact            Yes       [id number] 100mg      Take 1          

 [facility]



     one 100 mg      7-06                               tablet by           ity 

of



     tablet      00:00:                               mouth           [place]



               00                                 daily.           [facility]



                                                                 Branch

 

     spironolact            Yes            100mg      100 mg,           Un

[place]



     one       -05                               Oral,           ity of



     (ALDACTONE)      14:00:                               DAILY,           [place]

[place]



     tablet 100      00                                 First dose           Med

ical



     mg                                           on [facility]



                                                  21 at           



                                                  0900,           



                                                  Until           



                                                  Discontinu           



                                                  ed,            



                                                  Routine           

 

     furosemide            Yes            40mg      40 mg,           Unive

rs



     (LASIX)      05                               Oral,           ity of



     tablet 40      14:00:                               DAILY,           [place]



     mg        00                                 First dose           Medical



                                                  on [facility]



                                                  21 at           



                                                  0900,           



                                                  Until           



                                                  Discontinu           



                                                  ed,            



                                                  Routine           

 

     albumin      2021- No             12.5g      12.5 g, IV           Un

[place]



     (ALBUMINAR      -                          Infusion,           ity

 of



     25%) 25 %      08:00: 10:29                          ONCE, 1           [place]

[place]



     injection      00   :00                           dose, Mon           Medic

al



     12.5 g                                         21 at           Branch



                                                  0300, 100           



                                                  mL<br>Nilda           



                                                  cation:           



                                                  HEPATORENA           



                                                  L SYNDROME           



                                                  (DIAGNOSIS           



                                                  )<br>Comme           



                                                  nts:           



                                                  Dosin-8 g/L of           



                                                  ascitic           



                                                  fluid           



                                                  removed           

 

     KCL       2021- No             40meq      40 mEq,           [facility]



     (KLOR-CON       07-                          Oral,           ity of



     M20) tablet      05:00: 05:30                          ONCE, 1           [place]

[place]



     40 mEq      00   :00                           dose, [facility]



                                                  21 at           Branch



                                                  0000,           



                                                  Routine           

 

     acamprosate            Yes       [id number] 666mg      Take 2          

 [facility]



     333 mg      7-05                               tablets by           ity of



     tablet      00:00:                               mouth 3           [place]



               00                                 (three)           Medical



                                                  times           Branch



                                                  daily.           

 

     acamprosate            Yes       [id number] 666mg      Take 2          

 [facility]



     333 mg      7-05                               tablets by           ity of



     tablet      00:00:                               mouth 3           [place]



               00                                 (three)           Medical



                                                  times           Branch



                                                  daily.           

 

     acamprosate      2021-0      Yes       [id number] 666mg      Take 2          

 [facility]



     333 mg      7-05                               tablets by           ity of



     tablet      00:00:                               mouth 3           [place]



               00                                 (three)           Medical



                                                  times           Branch



                                                  daily.           

 

     KCL       2021- No             40meq      40 mEq,           [facility]



     (KLOR-CON      - 07-05                          Oral,           ity of



     M20) tablet      00:00: 02:51                          ONCE, 1           [facility]

[place]



     40 mEq      00   :00                           dose, [facility]



                                                  21 at           Branch



                                                  1900,           



                                                  Routine           

 

     iopamidol      2021- No        [id number] 100mL      100 mL,          

 [facility]



     (ISOVUE      -                          Intravenou           ity o

f



     370-500 mL)      17:15: 16:05                          s, ONCE, 1          

 [place]



     injection      00   :00                           dose, Sun           Medic

al



     100 mL                                         21 at           Branch



                                                  1215,           



                                                  Routine           

 

     KCL       2021- No             40meq      40 mEq,           [facility]



     (KLOR-CON       06-                          Oral,           ity of



     M20) tablet      21:15: 21:15                          ONCE, 1           [facility]

[place]



     40 mEq      00   :00                           dose, [facility]



                                                  21 at           Branch



                                                  1615,           



                                                  Routine           

 

     furosemide            Yes       [id number] 40mg      Take 1         

  [facility]



     40 mg      6-                [id number]           tablet by           ity of



     tablet      00:00:                               mouth           [place]



               00                                 daily.           Medical



                                                                 Branch

 

     spironolact            Yes       [id number] 50mg      Take 2        

   [facility]



     one 25 mg      6-                [id number]           tablets by           ity

 of



     tablet      00:00:                               mouth           [place]



               00                                 daily.           Medical



                                                                 Branch

 

     furosemide            Yes       [id number] 40mg      Take 1         

  [facility]



     40 mg      6-                76622           tablet by           ity of



     tablet      00:00:                               mouth           [place]



               00                                 daily.           Medical



                                                                 Branch

 

     spironolact            Yes       [id number] 50mg      Take 2        

   [facility]



     one 25 mg      6-                74743           tablets by           ity

 of



     tablet      00:00:                               mouth           [place]



               00                                 daily.           Medical



                                                                 Branch

 

     furosemide            Yes       [id number] 40mg      Take 1         

  [facility]



     40 mg      6-                31110           tablet by           ity of



     tablet      00:00:                               mouth           [place]



               00                                 daily.           [facility]



                                                                 Branch

 

     spironolact            Yes       [id number] 50mg      Take 2        

   [facility]



     one 25 mg      6-                63341           tablets by           ity

 of



     tablet      00:00:                               mouth           [place]



               00                                 daily.           Medical



                                                                 Branch

 

     furosemide      2021- No        [id number] 40mg      Take 1        

   [facility]



     40 mg                 tablet by           ity of



     tablet      00:00: 00:00                          mouth           [place]



               00   :00                           daily.           [facility]

 

     spironolact      2021- No        [id number] 50mg      Take 2       

    [facility]



     one 25 mg                 [id number]           tablets by           it

y of



     tablet      00:00: 00:00                          mouth           [place]



               00   :00                           daily.           [facility]

 

     lidocaine-e      2021- No                       PRN,           Unive

rs



     pinephrine                                Starting           ity 

of



     (XYLOCAINE      17:56: 17:56                          Tue 21           

[place]



     W/EPINEPHRI      00   :00                           at 1256,           Medi

tyler



     NE) 2                                         Until [facility]



     %-1:200,000                                         21 at           



     injection                                         1256,           



                                                  Routine           

 

     furosemide      2021- No             20mg      20 mg,           Univ

ers



     (LASIX)                                Slow IV           ity of



     injection      11:00: 11:02                          Push,           [place]



     20 mg      00   :00                           ONCE, 1           Medical



                                                  dose, [facility]



                                                  21 at           



                                                  0600,           



                                                  Routine           

 

     magnesium      2021- No             1g        1 g, IV           Univ

ers



     sulfate in                                Piggyback,           it

y of



     D5W 1      08:30: 09:42                          ONCE, 1           [place]



     gram/100 mL      00   :00                           dose, Tue           Med

ical



     RTU IV                                         21 at           [place]



     Piggyback 1                                         0330, 100           



     g                                            mL             

 

     calcium      2021- No             1g        1 g, IV           Univer

s



     gluconate 1                                Infusion,           it

y of



     g in NaCl      07:15: 07:44                          ONCE, 1           [place]

[place]



     50 mL      00   :00                           dose, [facility]



     (ISO-OSM)                                         21 at           [facility]

h



     RTU IV                                         0230,           



     infusion 1                                         Routine           



     g                                                           

 

     lactulose            Yes            30mL      30 mL,           Univer

s



     (CEPHULAC)                                     Oral, BID,           ity

 of



     solution 30      01:00:                               First dose           

[place]



     mL        00                                 on [facility]



                                                  21 at           [place]



                                                  2000,           



                                                  Until           



                                                  Discontinu           



                                                  ed,            



                                                  Routine           

 

     thiamine            Yes            100mg      100 mg,           Unive

rs



     (VITAMIN                                     Oral, BID,           ity o

f



     B1) tablet      01:00:                               First dose           [place]

[place]



     100 mg      00                                 on Mon           Medical



                                                  21 at           Branch



                                                  2000,           



                                                  Until           



                                                  Discontinu           



                                                  ed,            



                                                  Routine           

 

     phytonadion      2021- No             10mg      10 mg,           Uni

vers



     e (VITAMIN                                Subcutaneo           it

y of



     K)        00:15: 13:25                          us, DAILY,           [place]



     injection      00   :00                           3 doses,           Medica

l



     10 mg                                         First dose           Branch



                                                  (after           



                                                  last           



                                                  modificati           



                                                  on) on 21 at           



                                                  1915, Last           



                                                  dose on           



                                                  21           



                                                  at 0900,           



                                                  Routine           

 

     spironolact            Yes            25mg      25 mg,           Univ

ers



     one                                      Oral,           ity of



     (ALDACTONE)      00:00:                               DAILY,           [place]

[place]



     tablet 25      00                                 First dose           Medi

tyler



     mg                                           on Mon           Branch



                                                  21 at           



                                                  1900,           



                                                  Until           



                                                  Discontinu           



                                                  ed,            



                                                  Routine           

 

     furosemide            Yes            20mg      20 mg,           Unive

rs



     (LASIX)                                     Slow IV           ity of



     injection      00:00:                               Push,           [place]



     20 mg      00                                 DAILY,           Medical



                                                  First dose           Branch



                                                  on 21 at           



                                                  1900,           



                                                  Until           



                                                  Discontinu           



                                                  ed,            



                                                  Routine           

 

     ondansetron            Yes            4mg       4 mg, Slow           

[facility]



     (ZOFRAN                                     IV Push,           ity of



     (PF))      23:43:                               [id number],           [place]



     injection 4      59                                 Starting           Medi

tyler



     mg                                           Mon 21           Branch



                                                  at 1843,           



                                                  Until           



                                                  Discontinu           



                                                  ed,            



                                                  Routine,           



                                                  Nausea and           



                                                  Vomiting           



                                                  (N/V)           

 

     octreotide      2021- No             50ug/h      50 mcg/hr          

 [facility]



     (SANDOSTATI                                (10            ity of



     N) 1,250      20:00: 15:40                          mL/hr), IV           Te

[place]



     mcg in NaCl      00   :20                           Infusion,           Med

ical



     0.9% (NS)                                         CONTINUOUS           [facility]

ch



     250 mL                                         , Starting           



     infusion                                         21           



                                                  at 1500           

 

     octreotide      2021- No             50ug      50 mcg,           Uni

vers



     (SANDOSTATI                                Slow IV           ity 

of



     N)        20:00: 19:11                          Push,           [place]



     injection      00   :00                           ONCE, 1           Medical



     50 mcg                                         dose, Mon           Branch



                                                  21 at           



                                                  1500,           



                                                  STAT<br>In           



                                                  dication:           



                                                  Acute           



                                                  Variceal           



                                                  Hemorrhage           



                                                  in a           



                                                  Cirrhotic           



                                                  Patient           

 

     KCL       2021- No             40meq      40 mEq,           [facility]



     (KLOR-CON                                Oral,           ity of



     M20) tablet      18:00: 17:38                          ONCE, 1           Te

[place]



     40 mEq      00   :00                           dose, Mon           Medical



                                                  21 at           [facility]



                                                  1300,           



                                                  Routine           

 

     iopamidol      2021- No        [id number] 120mL      120 mL,         

  [facility]



     (ISOVUE                                Intravenou           ity o

f



     370-500 mL)      17:15: 16:07                          s, ONCE, 1          

 [place]



     injection      00   :00                           dose, Mon           Medic

al



     120 mL                                         21 at           Branch



                                                  1215,           



                                                  Routine           







Immunizations







           Ordered    Filled Immunization Date       Status     Comments   [facility]

e



           Immunization Name Name                                        

 

           Pneumococcal            2022 Completed             [facility]



           20-valent Vaccine            00:00:00                         

 

           Pneumococcal            2022 Completed             [facility]



           20-valent Vaccine            00:00:00                         

 

           Pneumococcal            2022 Completed             [facility]



           20-valent Vaccine            00:00:00                         

 

           SARS-COV-2 COVID-19            2021 Completed             Unive

rsity of



           PFIZER VACCINE            00:00:00                         [facility]

 

           SARS-COV-2 COVID-19            2021 Completed             Unive

rsity of



           PFIZER VACCINE            00:00:00                         [facility]

 

           SARS-COV-2 COVID-19            2021 Completed             Unive

rsity of



           PFIZER VACCINE            00:00:00                         [place] [facility]

[facility]



                                                                  Branch







Vital Signs







             Vital Name   Observation Time Observation Value Comments     Source

 

             HEIGHT       2023 20:00:00 165.1 cm                  

 

             WEIGHT       2023 20:00:00 81.3 kg                   

 

             HEIGHT       2023 08:12:00 165.1 cm                  

 

             WEIGHT       2023 08:12:00 75 kg                     

 

             HEIGHT       2023 20:00:00 165.1 cm                  

 

             WEIGHT       2023 20:00:00 81.3 kg                   

 

             HEIGHT       2023 08:12:00 165.1 cm                  

 

             WEIGHT       2023 08:12:00 75 kg                     

 

             Systolic blood 2023 20:35:00 119 mm[Hg]                Univer

sity of



             pressure                                            [facility]

 

             Diastolic blood 2023 20:35:00 60 mm[Hg]                 Unive

rsity of



             pressure                                            [facility]



                                                                 Branch

 

             Heart rate   2023 20:35:00 86 /min                   Universi

ty of



                                                                 [facility]



                                                                 Branch

 

             Body temperature 2023 20:35:00 37.28 Nickie                 Univ

ersity of



                                                                 [facility]



                                                                 Branch

 

             Respiratory rate 2023 20:35:00 16 /min                   Univ

ersity of



                                                                 [facility]



                                                                 Branch

 

             Oxygen saturation in 2023 20:35:00 97 /min                   

[facility]



             Arterial blood by                                        [facility]

[facility]



             Pulse oximetry                                        Branch

 

             Body weight  2023 22:59:00 84.687 kg                 Universi

ty of



                                                                 [facility]



                                                                 Branch

 

             BMI          2023 22:59:00 27.57 kg/m2               Universi

ty of



                                                                 [facility]



                                                                 Branch

 

             Body height  2023 21:39:38 175.3 cm                  Universi

ty of



                                                                 [facility]



                                                                 Branch

 

             Systolic blood 2021 17:32:00 117 mm[Hg]                Univer

sity of



             pressure                                            [facility]



                                                                 Branch

 

             Diastolic blood 2021 17:32:00 75 mm[Hg]                 Unive

rsity of



             pressure                                            [facility]



                                                                 Branch

 

             Heart rate   2021 17:32:00 82 /min                   Universi

ty of



                                                                 [facility]



                                                                 Branch

 

             Body temperature 2021 17:32:00 36.5 Nickie                  Univ

ersity of



                                                                 [facility]



                                                                 Branch

 

             Oxygen saturation in 2021 17:32:00 98 /min                   

[facility]



             Arterial blood by                                        [facility]

[facility]



             Pulse oximetry                                        Branch

 

             Respiratory rate 2021 13:18:00 20 /min                   Univ

ersity of



                                                                 [facility]



                                                                 Branch

 

             Body height  2021 01:49:00 167.6 cm                  Universi

ty of



                                                                 [facility]



                                                                 Branch

 

             Body weight  2021 01:49:00 77.111 kg                 Universi

ty of



                                                                 [facility]



                                                                 Branch

 

             BMI          2021 01:49:00 27.44 kg/m2               Universi

ty of



                                                                 [facility]



                                                                 Branch

 

             Systolic blood 2021 20:28:00 118 mm[Hg]                Univer

sity of



             pressure                                            [facility]



                                                                 Branch

 

             Diastolic blood 2021 20:28:00 69 mm[Hg]                 Unive

rsity of



             pressure                                            [facility]



                                                                 Branch

 

             Heart rate   2021 20:28:00 79 /min                   Universi

ty of



                                                                 [facility]



                                                                 Branch

 

             Body temperature 2021 20:28:00 36.83 Nickie                 Univ

ersity of



                                                                 [facility]



                                                                 Branch

 

             Respiratory rate 2021 20:28:00 18 /min                   Univ

ersity of



                                                                 [facility]



                                                                 Branch

 

             Oxygen saturation in 2021 20:28:00 95 /min                   

[facility]



             Arterial blood by                                        [facility]

[facility]



             Pulse oximetry                                        Branch

 

             Body height  2021 21:12:00 167.6 cm                  Universi

ty [facility]

 

             Body weight  2021 14:47:00 90.719 kg                 Universi

ty [facility]

 

             BMI          2021 14:47:00 32.28 kg/m2               Universi

ty [facility]

 

             Systolic blood 2022 12:48:00 113 mm[Hg]                [facility]



             pressure                                            

 

             Diastolic blood 2022 12:48:00 64 mm[Hg]                 [facility]

s [facility]



             pressure                                            

 

             Heart rate   2022 12:48:00 64 /min                   [facility]

 

             Body temperature 2022 12:48:00 37 Nickie                    [facility]

 

             Respiratory rate 2022 12:48:00 18 /min                   [facility]

[facility]

 

             Body height  2022 12:48:00 167.6 cm                  [facility]

 

             Body weight  2022 12:48:00 73.256 kg                 [facility]

 

             BMI          2022 12:48:00 26.07 kg/m2               [facility]

 

             Oxygen saturation in 2022 12:48:00 100 /min                  

[facility]



             Arterial blood by                                        



             Pulse oximetry                                        

 

             Systolic blood 2021 20:28:00 118 mm[Hg]                Univer

sity of



             pressure                                            [facility]

 

             Diastolic blood 2021 20:28:00 69 mm[Hg]                 Unive

rsity of



             pressure                                            [facility]

 

             Heart rate   2021 20:28:00 79 /min                   Universi

ty of



                                                                 [facility]

 

             Body temperature 2021 20:28:00 36.83 Nickie                 Univ

ersity of



                                                                 [facility]



                                                                 Branch

 

             Respiratory rate 2021 20:28:00 18 /min                   Univ

ersity of



                                                                 [facility]

 

             Oxygen saturation in 2021 20:28:00 95 /min                   

[facility]



             Arterial blood by                                        [facility]

[facility]



             Pulse oximetry                                        Branch

 

             Body height  2021 21:12:00 167.6 cm                  [facility]

 

             Body weight  2021 14:47:00 90.719 kg                 [facility]

 

             BMI          2021 14:47:00 32.28 kg/m2               [facility]







Procedures







                Procedure       Date / Time     Performing Clinician Source



                                Performed                       

 

                MAGNESIUM       2023 10:30:00 Izaiah [facility]

 

                BASIC METABOLIC PANEL (NA, 2023 10:30:00 Fidencio Bliss   [facility] [place]



                K, CL, CO2, GLUCOSE, BUN,                                 Medica

l Branch



                CREATININE, [place])                                 

 

                CBC WITHOUT DIFF 2023 10:30:00 Izaiah [facility]

 

                MAGNESIUM       2023 20:51:00 Izaiah [facility]

 

                BASIC METABOLIC PANEL (NA, 2023 20:51:00 Bliss, Fidencio   [facility]

[facility] [place]



                K, CL, CO2, GLUCOSE, BUN,                                 Medica

l Branch



                CREATININE, CA)                                 

 

                MAGNESIUM       2023 09:59:00 Jin Gutiérrez [facility]

 

                BASIC METABOLIC PANEL (NA, 2023 09:59:00 FranciscoCurtRashmi   [facility]

[facility] [place]



                K, CL, CO2, GLUCOSE, BUN,                                 Medica

l Branch



                CREATININE, CA)                                 

 

                [facility] DIFF [facility]23 09:59[facility]00 [facility]

 

                PROTHROMBIN TIME / INR 2023 09:59:00 Rashmi Francisco

[facility]

 

                [facility]IBRINOGEN      2023 09:59:00 [facility]

 

                PROTHROMBIN TIME / INR 2023 11:25:00 Rashmi Francisco   [facility]

[facility]

 

                FIBRINOGEN      2023 11:25:00 Curt Franciscooja   [facility]

 

                RETICULOCYTES AUTOMATED 2023 11:25:00 Tracy White 

[facility]

 

                MAGNESIUM       2023 08:36:00 [facility]

 

                BASIC METABOLIC PANEL (NA, 2023 08:36:00 Tracy White [facility]



                K, CL, CO2, GLUCOSE, BUN,                 [facility]

[facility]



                CREATININE, CA)                                 

 

                CBC WITH DIFF   2023 08:36:00 Tracy White [facility]

 

                BLOOD CULTURE SCREEN 2023 03:07:00 Rashmi [facility]

 

                FIBRINOGEN      2023 22:21:00 Nolan [facility]

 

                PREPARE CRYOPRECIPITATE 2023 18:41:31 Nolan [facility]

 

                TRANSTHORACIC ECHO (TTE) 2023 17:09:13 Tracy White

 [facility]



                COMPLETE W/ CONTRAST                 [facility]

nch

 

                MAGNESIUM       2023 08:53:00 Nolan [facility]

 

                BASIC METABOLIC PANEL (NA, 2023 08:53:00 Rashmi Francisco   [facility] [place]



                K, CL, CO2, GLUCOSE, BUN,                                 [facility]

[facility]



                CREATININE, CA)                                 

 

                CBC WITH DIFF   2023 08:53:00 Nolan [facility]

 

                PROTHROMBIN TIME / INR 2023 08:53:00 Rashmi Francisco   [facility]

 

                FIBRINOGEN      2023 08:53:00 Nolan [facility]

 

                VANCOMYCIN TROUGH 2023 04:17:00 Sincere Griffith  [facility]

 

                LACTIC ACID WHOLE BLOOD 2023 21:48:00 Norman Logan  [facility]

 

                TRANSFUSE PACKED RBC 2023 20:00:00 Rashmi [facility]   [facility]

 

                PREPARE PACKED RBC 2023 19:27:30 Nolan Rashmi   [facility]

 

                LACTIC ACID WHOLE BLOOD 2023 17:09:00 Lucia [facility]

 

                CBC WITHOUT DIFF 2023 17:08:00 Rashmi Francisco   [facility]

 

                TRANSFUSE PACKED RBC 2023 14:35:00 Rashmi Francisco   [facility]

 

                CBC WITHOUT DIFF 2023 13:11:00 Rashmi Francisco   [facility]

 

                US ABDOMEN LIMITED 2023 12:59:00 Tracy White [facility]

 

                LACTIC ACID WHOLE BLOOD 2023 11:46:00 Tracy White 

[facility]

 

                PROTHROMBIN TIME / INR 2023 11:45:00 Rashmi Francisco   [facility]

 

                TRANSFUSE PACKED RBC 2023 10:31:00 Tracy White [facility]

 

                TRANSFUSE CRYOPRECIPITATE 2023 10:28:00 [facility]

 

                PREPARE CRYOPRECIPITATE 2023 10:16:18 Sincere Griffith  [facility]

 

                PREPARE PACKED RBC 2023 10:16:18 Tracy White [facility]

 

                CBC WITHOUT DIFF 2023 08:46:00 Tracy White [facility]

 

                LACTIC ACID WHOLE BLOOD 2023 08:46:00 Carlos Enrique Rendon

[facility]

 

                BASIC METABOLIC PANEL (NA, 2023 08:45:00 Carlos Enrique Rendon

[facility]



                K, CL, CO2, GLUCOSE, BUN,                                 Medica

l Branch



                CREATININE, CA)                                 

 

                HEPATITIS B SURFACE 2023 08:45:00 Tracy White [facility]



                ANTIGEN                         [facility]

 

                HEPATITIS B CORE ANTIBODY 2023 08:45:00 Tracy White [facility]



                IGM                             [facility]

 

                TRANSFUSE PACKED RBC 2023 06:30:00 Tracy White [facility]

 

                URINE DRUG (IMMUNOASSAY) - 2023 03:56:00 Rashmi Francisco   [facility] [place]



                COMPREHENSIVE DRUG SCREEN                                 Medica

l Branch

 

                URINALYSIS      2023 03:56:00 Curt Franciscooja   [place] [facility]

f [facility]

 

                GALV ONLY - URINE DRUG 2023 03:56:00 Rashmi Francisco   [facility]



                (LCMSMS) - WILIAM PANEL                                 Medical Br

anch

 

                AC PANEL 20 + LACTIC ACID 2023 03:56:00 Tracy White [facility]

 

                IRON PANEL      2023 03:55:00 Tracy White [facility]

 

                CBC WITHOUT DIFF 2023 03:55:00 Rashmi Francisco   [facility]

 

                HEPATITIS B SURFACE 2023 03:55:00 Tracy White [facility]



                ANTIBODY                        [facility]

 

                HCV ANTIBODY    2023 03:55:00 Tracy White [facility]

 

                SYPHILIS IGG/IGM 2023 03:55:00 Roberto Merrill [facility]

 

                TRANSFUSE PACKED RBC 2023 01:15:00 Rashmi [facility]   [facility]

 

                PREPARE PACKED RBC 2023 00[facility]29:29 [facility]

 

                AC PANEL 20 + LACTIC ACID 2023 00:05:00 Clive Luther   [facility]

 

                AC PANEL 21 + LACTIC ACID 2023 00:00:00 Rashmi [facility]   [facility]

 

                BLOOD CULTURE SCREEN 2023 23:57:00 Rashmi Francisco   [facility]

 

                PHOSPHORUS      2023 23:57:00 Francisco, [facility]

 

                CREATINE KINASE 2023 23:57:00 Nolan [facility]

 

                MAGNESIUM       2023 23:57:00 Nolan [facility]

 

                OSMOLALITY, SERUM OR 2023 23:57:00 Tracy White [facility]



                PLASMA                          [facility]

 

                AMMONIA, PLASMA 2023 23:57:00 Nolan [facility]

 

                FREE T4         2023 23:57:00 Tracy White [facility]

 

                THYROID STIMULATING 2023 23:57:00 Christopher Joe [facility]



                HORMONE                         [facility]

 

                HEPATIC FUNCTION PANEL 2023 23:57:00 Nolan Rashmi   [facility]



                ([id number]) (ALB,T.PRO,BILI                                 [facility]



                T,BU/BC,ALT,AST,ALK PHOS)                                 

 

                BASIC METABOLIC PANEL (NA, 2023 23:57:00 Rashmi Francisco   [facility] [place]



                K, CL, CO2, GLUCOSE, BUN,                                 Medica

l Branch



                CREATININE, CA)                                 

 

                ETHANOL         2023 23:57:00 Nolan [facility]

 

                CBC WITH DIFF   2023 23:57:00 Nolan [facility]

 

                FIBRINOGEN      2023 23:57:00 Nolan [facility]

 

                HCV ANTIBODY    2023 23:57:00 Nacho [facility]

 

                BLOOD CULTURE WORKUP 2023 23:57:00 Nolan Rashmi   [facility]

 

                HIV 1/2 AG-AB WITH REFLEX 2023 23:57:00 Roberto Merrill

[facility]

 

                GRAM NEGATIVE BLOOD 2023 23:57:00 Curt Franciscooja   [facility] [place]



                PATHOGENS DNA                                   [facility]



                PROBE-AEROBIC                                   

 

                XR FEMUR 2 VW LEFT 2023 22:58:00 [facility]   [facility]

 

                XR KNEE <3 VW LEFT 2023 22:58:00 [facility]   [facility]

 

                XR TIBIA FIBULA 2 VW LEFT 2023 22:58:00 Vahibe, Clive   [facility]

 

                CT TRAUMA HEAD WO CONTRAST 2023 22:07:00 Vahibe, Clive   [facility]

 

                CT TRAUMA THORAX W 2023 22:07:00 [facility]ibe, [facility]ive   [facility]



                CONTRAST                                        [facility]

 

                CT TRAUMA CERVICAL SPINE 2023 22:07:00 Vahibe, Clive   [facility]                                     [facility]

 

                CT TRAUMA THORACIC SPINE 2023 22:07:00 Vahibe, Clive   [facility]                                     [facility]

 

                CT TRAUMA ABDOMEN PELVIS W 2023 22:07:00 Vahibe, Clive   U

[facility]

 

                CT TRAUMA LUMBAR SPINE WO 2023 22:07:00 Vahibe, Clive   [facility]

 

                BASIC METABOLIC PANEL (NA, 2023 21:42:00 Chantell Johnson

[facility]



                K, CL, CO2, GLUCOSE, BUN,                                 Medica

l Branch



                CREATININE, CA)                                 

 

                CBC WITHOUT DIFF 2023 21:42:00 Chantell Johnson     [facility]

 

                PROTHROMBIN TIME / INR 2023 21:42:00 Chantell Johnson     [facility]

 

                ACTIVATED PARTIAL THRMPLAS 2023 21:42:00 Chantell Johnson

[facility]

 

                HB ABO GROUPING 2023 21:42:00 Chantell Johnson     [place]

f [facility]

 

                EXTRA TUBE DK. GREEN 2023 21:42:00 Chantell Johnson     [facility]

 

                COMPREHENSIVE METABOLIC 2022 10:33:00 Santiago Vick

 Health



                PANEL                                           

 

                HEMOGLOBIN A1C  2022 10:33:00 Santiago Vick Healt

h

 

                LIPID PROFILE   2022 10:33:00 Santiago Vick

h

 

                CBC/DIFF        2022 10:33:00 Santiago Vick

h

 

                CBC             2022 10:33:00 Santiago Vick

h

 

                DIFFERENTIAL, MANUAL-[facility] 2022 10:33:00 Santiago Vick [facility]

 

                FECAL OCCULT BLOOD 2022 08:00:00 Santiago Vick

 

                HEMOCCULT KIT FOR SPECIMEN 2022 13:11:09 Santiago Vick

formerly [facility]



                COLLECTION AT HOME                                 

 

                MAGNESIUM       2022-07-15 05:50:00 AdwoaKevin chang

h

 

                COMPREHENSIVE METABOLIC 2022-07-15 05:50:00 Kevin Orona

is Health



                PANEL                                           

 

                PT/INR/PTT      2022-07-15 05:50:00 Kevin Orona



 

                CBC/DIFF        2022-07-15 05:50:00 Grecia Lagunas

lt

 

                CBC             2022-07-15 05:50:00 Grecia Lagunas amy

lt

 

                DIFFERENTIAL, MANUAL (NO 2022-07-15 05:50:00 Grecia Lagunas

formerly [facility]



                MORPHOLOGY)-[facility] GI PROC EGD 2022 15:59:00 Derrek Phoenix [facility]



                DIAGNOSTIC BRUSH WASH                                 

 

                IP CONSULT TO PHYSICAL 2022 08:59:09 Grecia Lagunas [facility]



                THERAPY                                         

 

                HGB/HCT         2022 06:06:00 Shawna Cervantes He

alth

 

                MAGNESIUM       2022 03:08:00 Kevin Orona

h

 

                COMPREHENSIVE METABOLIC 2022 03:08:00 Kevin Orona

is Health



                PANEL                                           

 

                PT/INR/PTT      2022 03:08:00 Kevin Oronat

h

 

                CBC/DIFF        2022 03:08:00 Grecia Lagunasa

lth

 

                CBC             2022 03:08:00 Grecia Lagunas

lt

 

                DIFFERENTIAL, MANUAL-[facility] 2022 03:08:00 Grecia Lagunas [facility]

 

                PT/INR/PTT      2022 17:47:00 Marguerite Thapa [facility]

 

                GAMMA GLUTAMYL TRANSFERASE 2022 17:47:00 Marguerite Thapa

 Three [facility]



                (GGT)                                           

 

                CELIAC DISEASE  2022 17:47:00 Marguerite Thapa [facility]

 

                CERULOPLASMIN   2022 17:47:00 Marguerite Thapa [facility]

 

                ALPHA-1-ANTITRYPSIN 2022 17:47:00 Marguerite Thapa [facility]

 

                MAI             2022 17:47:00 Marguerite Thapa [facility]

 

                MITOCHONDRIAL (M2) 2022 17:47:00 Alanis [facility]



                ANTIBODY                                        

 

                LIVER KIDNEY MICR 2022 17:47:00 Marguerite Thapa [facility]

ealth

 

                IGM             2022 17:47:00 Marguerite Thapa [facility]

 

                ABD PARACENTESIS W/IMAGING 2022 14:13:43 [facility]



                                                Mohammad        

 

                ALBUMIN, [facility]    2022 13:47:00 Grecia Lagunas [facility]

 

                T PROTEIN, FLD  2022 13:47:00 Grecia Lagunas [facility]

 

                CELL COUNT, FLUID 2022 13:47:00 Grecia Lagunas [facility]

[facility]

 

                GLUCOSE, FLD    2022 13:47:00 Grecia Lagunas [facility]

 

                CELL COUNT, BODY FLUID 2022 13:47:00 Grecia Lagunas [facility]

 

                DIFFERENTIAL, CELL COUNT 2022 13:47:00 Grecia Lagunas [facility]

 

                FLUID CULTURE AND GRAM 2022 13:47:00 Grecia Lagunas [facility]



                STAIN                                           

 

                TRANSFUSE PLATELETS 2022 [facility]:20:00 [facility]



                ([facility]URSING)                                       

 

                SARS-COV-2, FLU A/B, RSV 2022 08:51:00 Derrek Phoenix

 [facility]

 

                CORONAVIRUS, COVID-19, WILLIAM 2022 08:51:00 Derrek Phoenix [facility]

 

                MAGNESIUM       2022 04:30:00 AdwoaKevin chang [facility]

h

 

                COMPREHENSIVE METABOLIC 2022 04:30:00 Kevin Orona

is Health



                PANEL                                           

 

                PT/INR/PTT      2022 04:30:00 AdwoaKevin chang   [facility]

 

                CBC/DIFF        2022 04:30:00 Grecia Lagunas [facility]

 

                TYPE AND SCREEN 2022 04:30:00 Derrek Phoenix [facility]

ealth

 

                CBC             2022 04:30:00 Grecia Lagunas [facility]

lt

 

                T&S - COLLECTION 2022 04:30:00 Derrek Phoenix [facility]

 

                PHOSPHORUS      2022 04:30:00 Grecia Lagunas [facility]

 

                RBC MORPHOLOGY-WAM 2022 04:30:00 Grecia Lagunas [facility]

 

                PREPARE PLATELETS (LAB) 2022 04:30:00 Grecia Lagunas [facility]

 

                PREPARE CROSSMATCH RBC 2022 04:30:00 Grecia Lagunas [facility]



                UNITS                                           

 

                HEPATITIS A VIRUS AB IGG 2022 13:40:00 Grecia Lagunas

formerly [facility]

 

                MAGNESIUM       2022 03:44:00 AdwoaKevin chang [facility]

 

                COMPREHENSIVE METABOLIC 2022 03:44:00 Kevin Orona

is Health



                PANEL                                           

 

                PT/INR/PTT      2022 03:44:00 Kevin Orona   [facility]

 

                CBC/DIFF        2022 03:44:00 Grecia Lagunas [facility]

lt

 

                CBC             2022 03:44:00 Grecia Lagunas [facility]

 

                DIFFERENTIAL, MANUAL-WAM 2022 03:44:00 Grecia Lagunas

formerly [facility]

 

                XRAY ABDOMEN 1 VIEW 2022 19:34:18 Shayna Tabares [facility]

 

                BLOOD CULTURE   2022 14:02:00 [facility]

th

 

                CT HEAD W/O CONTRAST 2022 11:29:00 Anna Vásquez [facility]

 

                XRAY CHEST 1 VIEW 2022 10:12:00 [facility]as 

[facility]

 

                URINALYSIS W/REFLEX TO 2022 10:05:00 Grecia Lagunas [facility]



                URINE CULTURE                                   

 

                URINALYSIS      2022 10:05:00 Grecia Lagunas [facility]

 

                URINE CULTURE COLLECTION 2022 10:05:00 Grecia Lagunas [facility]



                KIT                                             

 

                HGB/HCT         2022 09:39:00 Grecia Lagunas [facility]

 

                LACTIC ACID     2022 09:37:00 Grecia Lagunas [facility]

 

                12 LEAD EKG     2022 09:29:59 Grecia Lagunas [facility]

 

                BLOOD GAS, ARTERIAL 2022 09:29:00 [facility]

 

                BLOOD GAS LACTIC ACID, 2022 09:29:00 Grecia Lagunas [facility]



                VENOUS                                          

 

                GLUCOSE POC     2022 09:14:00 MayMarcy [facility]

 

                MAGNESIUM       2022 04:47:00 Kevin Orona [facility]

 

                COMPREHENSIVE METABOLIC 2022 04:47:00 Kevin Orona   [facility]



                PANEL                                           

 

                PT/INR/PTT      2022 04:47:00 Kevin Orona [facility]

 

                FIBRINOGEN      2022 04:47:00 Grecia Lagunas [facility]

 

                CBC/DIFF        2022 04:47:00 Grecia Lagunas [facility]

 

                CBC             2022 04:47:00 Grecia Lagunas [facility]

 

                INFUSION PUMP   2022-07-10 20:48:21 MayMarcy [facility]

 

                CONSULT CLINICAL CASE 2022-07-10 18:36:47 Anna Vásquez

s Health



                MANAGEMENT (RN/SW)                                 

 

                SEQUENTIAL COMPRESSION 2022-07-10 18:22:34 May, Marcy ANDERSON Colten

is Health



                PUMP                                            

 

                SEQUENTIAL COMPRESSION 2022-07-10 18:22:34 May, Marcy Abel

is Health



                PUMP                                            

 

                HGB/HCT         2022-07-10 15:32:00 Grecia Lagunas [facility]

lt

 

                TRANSFUSE (RED CELL UNITS 2022-07-10 11:35:00 Grecia Lagunas [facility]



                - NURSING)                                      

 

                MAGNESIUM       2022-07-10 04:02:00 Kevin Orona [facility]

 

                COMPREHENSIVE METABOLIC 2022-07-10 04:02:00 Kevin Orona

 Health



                PANEL                                           

 

                PT/INR/PTT      2022-07-10 04:02:00 Kevin Orona [facility]

 

                AFP, TUMOR MARKER 2022-07-10 04:02:00 Grecia Lagunas 

ealt

 

                COPPER, SERUM OR PLASMA 2022-07-10 04:02:00 Grecia Lagunas

[facility]

 

                RETIC COUNT     2022-07-10 04:02:00 Grecia Lagunas [facility]

 

                HAPTOGLOBIN     2022-07-10 04:02:00 Grecia Lagunas [facility]

 

                LACTATE DEHYDROGENASE 2022-07-10 04:02:00 Grecia Lagunas

[facility]



                (LDH)                                           

 

                AMMONIA         2022-07-10 04:02:00 Grecia Lagunas [facility]

lth

 

                CEA             2022-07-10 04:02:00 Grecia aLgunas [facility]

 

                HGB/HCT         2022 22:57:00 Sujata Mueller [facility]

 

                DUPLEX DOPPLER ABD/PEL 2022 21:00:00 Grecia Lagunas [facility]



                VASCULAR STUDY, COMPLETE                                 

 

                U/S ABDOMEN     2022 20:35:00 Grecia Lagunas [facility]

 

                CT CHEST W CONTRAST 2022 20:05:18 Grecia Lagunas [facility]

 

                CT ABDOMEN AND PELVIS 2022 20:05:18 Grecia Lagunas

is Health



                CONTRAST                                        

 

                HGB/HCT         2022 15:26:00 Grecia Lagunas Hea

lth

 

                HGB/HCT         2022 08:39:00 AdwoaKevin

h

 

                CBC/DIFF        2022 05:37:00 AdwoaKevin

h

 

                COMPREHENSIVE METABOLIC 2022 05:37:00 AdwoaKevin

is Health



                PANEL                                           

 

                CBC             2022 05:37:00 AdwoaKevin

h

 

                SAVE SMEAR/ NOT FOR PATH 2022 05:37:00 AdwoaKevin   [facility]



                REVIEW                                          

 

                DIFFERENTIAL, MANUAL-WAM 2022 05:37:00 AdwoaKevin

[facility]

 

                MAGNESIUM       2022 04:40:00 AdwoaKevin

h

 

                PT/INR/PTT      2022 04:40:00 AdwoaKevin

h

 

                FOLIC ACID      2022 04:40:00 [facility]

[facility]

 

                IRON PROFILE    2022 04:40:00 [facility]Kevin [facility]

h

 

                HIV AG/AB COMBO ROUTINE 2022 04:40:00 AdwoaKevin

 Health



                SCREENING                                       

 

                HEPATITIS PANEL 2022 04:40:00 AdwoaKevin

h

 

                TRANSFUSE (RED CELL UNITS 2022 02:45:00 João Bernal [facility]



                - NURSING)                                      

 

                URINALYSIS      2022 01:38:00 [facility]aKevin [facility]

 

                SODIUM, URINE, RANDOM 2022 01:38:00 Adwoa [facility]

 

                URINALYSIS      2022 01:38:00 [facility]aKevin [facility]

h

 

                TRANSFUSE (RED CELL UNITS 2022 22:30:00 João Bernal

[facility]



                - NURSING)                                      

 

                SEQUENTIAL COMPRESSION 2022 21:47:40 AdwoaKevin

s Health



                PUMP                                            

 

                SEQUENTIAL COMPRESSION 2022 21:47:40 AdwoaKevini

s Health



                PUMP                                            

 

                SARS-COV-2, FLU A/B, RSV 2022 20:45:00 João Bernal [facility]

 

                CORONAVIRUS, COVID-19, WILLIAM 2022 20:45:00 João Bernal

formerly [facility]

 

                TRANSFUSE (RED CELL UNITS 2022 17:30:00 João Bernal

[facility]



                - NURSING)                                      

 

                12 LEAD EKG     2022 17:14:43 João Bernal

 

                TROPONIN I      2022 17:08:00 João Bernal

 

                THYROID STIMULATING 2022 17:08:00 Kevin Orona

ealtpako



                HORMONE (TSH)                                   

 

                VITAMIN B12     2022 17:08:00 Kevin Orona

 

                FERRITIN        2022 17:08:00 Kevin Orona

 

                TYPE AND SCREEN 2022 15:47:00 Harry Jang



 

                T&S - COLLECTION 2022 15:47:00 Harry Jang

lth

 

                ABO/RH CONFIRMATION 2022 15:47:00 Harry Jang

 

                PREPARE CROSSMATCH RBC 2022 15:47:00 Grecia Lagunas [facility]



                UNITS                                           

 

                XRAY CHEST 2 VIEWS 2022 11:31:00 Victoria Capone

alth

 

                CBC/DIFF        2022 11:00:00 Victoria Capone

 

                BASIC METABOLIC PANEL 2022 11:00:00 Victoria Capone

 [facility]

 

                LIVER PROFILE   2022 11:00:00 Victoria Capone

 

                PT/INR/PTT      2022 11:00:00 Victoria Capone

 

                B-TYPE NATRIURETIC PEPTIDE 2022 11:00:00 Victoria Capone

formerly [facility]



                (BNP)                                           

 

                CBC             2022 11:00:00 Victoria Capone

 

                DIFFERENTIAL, MANUAL-WAM 2022 11:00:00 Victoria Capone  [facility]

 

                SYPHILIS SCREEN FOR 2022 11:00:00 Kevin Orona



                INFECTION                                       

 

                BASIC METABOLIC PANEL (NA, 2021 10:29:00 Jorge Luis [facility]



                K, CL, CO2, GLUCOSE, BUN,                                 Medica

l Branch



                CREATININE, CA)                                 

 

                CBC WITH DIFF   2021 10:29:00 Lesley [facility]

 

                ALBUMIN BODY FLUID 2021 06:00:00 Dwight Gan [facility]

 

                T.PROTEIN BODY FLUID 2021 06:00:00 Dwight Gan [facility]

 

                BODY FLUID DIRECT COUNT 2021 06:00:00 Lesley [facility]

 

                BODY FLUID (BACTEC BOTTLE) 2021 06:00:00 Robert Snell

[facility]

 

                COVID-19 (ID NOW RAPID 2021 20:46:00 Lesley [facility]



                TESTING)                                        Medical Branch

 

                LAB ONLY COVID  2021 20:46:00 Lesley [facility]



                INTERPRETATION                                  [facility]

 

                HEPATIC FUNCTION PANEL 2021 16:17:00 Jean Latham   [facility]



                ([id number]) (ALB,T.PRO,BILI                                 [facility]



                T,BU/BC,ALT,AST,ALK PHOS)                                 

 

                BASIC METABOLIC PANEL (NA, 2021 16:17:00 Jean Latham

[facility]



                K, CL, CO2, GLUCOSE, BUN,                                 Medica

l Branch



                CREATININE, CA)                                 

 

                CBC WITH DIFF   2021 16:17:00 Jean Latham   [facility]

 

                PROTHROMBIN TIME / INR 2021 16:17:00 Jean Latham   [facility]

 

                CT ABDOMEN PELVIS W 2021 16:09:21 Jean Latham   [facility] [place]



                CONTRAST                                        [facility]

 

                CONSENT/REFUSAL FOR 2021 15:22:40 Doctor Unassigned, [facility]



                DIAGNOSIS AND TREATMENT                 [place]         Medical 

Branch

 

                PREPARE PACKED RBC 2021 23:59:[address] [facility]

[facility]

 

                PREPARE PACKED RBC 2021 23:59:[address] [facility]

[facility]

 

                COMP. METABOLIC PANEL 2021 14:45:00 [facility] Levine, Susan. \Hospital Has a New Name and Outlook.\""



                ([id number])                                         [facility]

 

                CBC WITH DIFF   2021 14:45:00 [facility]

 

                COMP. METABOLIC PANEL 2021 14:45:00 [facility] Levine[facility] Susan. \Hospital Has a New Name and Outlook.\""



                ([id number])                                         [facility]

 

                CBC WITH DIFF   2021 14:45:00 Moe [facility]

 

                IR              2021 18:05:00 Amy Caldera [facility]



                PARACENTESIS/PERITONECENTE                                 Medic

[facility]



                SIS WITH IMAGING                                 

 

                IR              2021 18:05:00 Amy Caldera [facility]



                PARACENTESIS/PERITONECENTE                                 [facility]



                SIS WITH IMAGING                                 

 

                T.PROTEIN BODY FLUID 2021 18:01:00 Daisy Friend [facility]

 

                T.PROTEIN BODY FLUID 2021 18:01:00 Daisy Friend [facility]

 

                ALBUMIN BODY FLUID 2021 18:00:00 Amy Caldera [facility]

 

                BODY FLUID      2021 18:00:00 Amy Caldera [facility]



                CULTURE(AEROBIC/ANAEROBIC)                                 [facility]

 

                CYTO ABDOMINAL FLUID 2021 18:00:00 Amy Caldera

[facility]

 

                BODY FLUID DIRECT COUNT 2021 18:00:00 Amy Caldera

[facility]

 

                BODY FLUID MANUAL DIFF 2021 18:00:00 Amy Caldera

[facility]

 

                BODY FLUID      2021 18:00:00 Amy Caldera [facility]



                CULTURE(AEROBIC/ANAEROBIC)                                 [facility]

 

                ALBUMIN BODY FLUID 2021 18:00:00 [facility]

 

                CYTO ABDOMINAL FLUID 2021 18:00:00 Abu Janet [facility]

 

                PHOSPHORUS      2021 06:16:00 Clinton [facility]

 

                MAGNESIUM       2021 06:16:00 [place], [facility]

 

                IONIZED CALCIUM 2021 06:16:00 [place], [facility]

 

                CBC WITH DIFF   2021 06:16:00 Abu Janet [facility]

 

                IONIZED CALCIUM 2021 06:16:00 Clinton [facility]

 

                CBC WITH DIFF   2021 06:16:00 Jean Carlos Gonzales [facility]

 

                MAGNESIUM       2021 06:16:00 Clinton [facility]

 

                PHOSPHORUS      2021 06:16:00 Clinton [facility]

 

                PREPARE PACKED RBC 2021 02:14:47 [facility]y [facility]

 

                PREPARE PACKED RBC 2021 02:14:47 Jean Carlos [facility]

 

                US ABDOMEN LIMITED WITH 2021 00:42:57 Amy Caldera [facility] ABDOMEN LIMITED WITH 2021 00:42:[address] [facility]

 

                CERULOPLASMIN   2021 23:24:00 [facility]

 

                ALPHA 1 ANTITRYPSIN 2021 23:24:00 Marcy Rosa [facility]

 

                IMMUNOGLOBULIN G 2021 23:24:00 Marcy Rosa [facility]

 

                BASIC METABOLIC PANEL (NA, 2021 23:24:00 AbJd Burroughs [facility]



                K, CL, CO2, GLUCOSE, BUN,                                 [facility]

l [facility]



                CREATININE, CA)                                 

 

                ALPHA FETOPROTEIN 2021 23:24:00 Marcy Rosa [facility]

 

                ANTI-NUCLEAR ANTIBODY 2021 23:24:00 Marcy Rosa [facility]

 

                HEPATITIS B SURFACE 2021 23:24:00 Marcy Rosa [facility]



                ANTIBODY                                        Medical Branch

 

                HEPATITIS B SURFACE 2021 23:24:00 Marcy Rosa [facility]



                ANTIGEN                                         [facility]

 

                HCV ANTIBODY    2021 23:24:00 Marcy Rosa [facility]

 

                HBC ANTIBODY (IGM & IGG) 2021 23:24:00 Marcy Rosa [facility]

 

                HAV ANTIBODY (IGG AND IGM) 2021 23:24:00 Marcy Rosa [facility]

 

                ALPHA 1 ANTITRYPSIN 2021 23:24:00 Marcy Rosa [facility]

 

                ALPHA FETOPROTEIN 2021 [facility]:24:00 [facility]

 

                CERULOPLASMIN   2021 23:24:00 Marcy Rosa [facility]

 

                HCV ANTIBODY    2021 23:24:00 Marcy Rosa [facility]

 

                IMMUNOGLOBULIN G 2021 23:24:00 Marcy Rosa [facility]

 

                SMOOTH MUSCLE AB,IGG 2021 23:24:00 Marcy Rosa [facility]



                W/REFLEX                                        [facility] [place]

 

                ANTI-NUCLEAR ANTIBODY 2021 23:24:00 Marcy Rosa [facility]

 

                HAV ANTIBODY (IGG AND IGM) 2021 23:24:00 Marcy Rosa [facility]

 

                HEPATITIS B SURFACE 2021 23:24:00 Marcy Rosa [facility]



                ANTIGEN                                         [facility]

 

                HEPATITIS B SURFACE 2021 23:24:00 Marcy Rosa [facility]



                ANTIBODY                                        Medical Branch

 

                HBC ANTIBODY (IGM & IGG) 2021 23:24:00 Marcy Rosa

[facility]

 

                BASIC METABOLIC PANEL (NA, 2021 23:24:00 Abu Atherfrancheska, Morganamy

n [facility]



                K, CL, CO2, GLUCOSE, BUN,                                 Medica

l Branch



                CREATININE, CA)                                 

 

                HB ABO GROUPING 2021 23:22:00 Abu Atherfrancheska, Amy [facility]

 

                HB ABO GROUPING 2021 23:22:00 Abu Ather, [facility]

 

                CBC WITH DIFF   2021 22:37:00 Abu [facility], [facility]

 

                PROTHROMBIN TIME / INR 2021 22:37:00 Marcy Rosa

[facility]

 

                CBC WITH DIFF   2021 22:37:00 Abu [facility], [facility]

 

                PROTHROMBIN TIME / INR 2021 22:37:00 [facility]

[facility]

 

                ABORH CONFIRMATION 2021 17:39:00 IbAmanda millso [facility]

[facility]

 

                ABORH CONFIRMATION 2021 17:39:00 Maddie Cardoza [facility]

 

                HB ABO GROUPING 2021 17:03:00 Maddie Cardoza [facility]

 

                HB ABO GROUPING 2021 17:03:00 Maddie Cardoza [facility]

 

                URINALYSIS      2021 16:46:00 IbAmanda millso F [facility]

 

                URINALYSIS      2021 16:46:00 Maddie Cardoza [facility]

 

                CT ABDOMEN PELVIS W 2021 16:12:42 Madd[address] Cardoza [facility]

 

                CT ABDOMEN PELVIS W 2021 16:12:42 [facility] [place]



                CONTRAST                                        [facility]

 

                XR CHEST 1 VW   2021 15:24:06 Maddie Cardoza [facility]

 

                XR CHEST 1 VW   2021 15:24:06 Maddie Cardoza [facility]

 

                HB ECG ROUTINE & RHYTHM 2021 15:15:37 Maddie Cardoza 

[facility]

 

                HB ECG ROUTINE & RHYTHM 2021 15:15:37 Maddie Cardoza 

[facility]

 

                LIPASE          2021 15:13:00 Maddie Cardoza [facility]

 

                FERRITIN SERUM  2021 15:13:00 Marcy Rosa o

[facility] [place]

 

                TROPONIN I      2021 15:13:00 Maddie Cardoza [facility]

 

                COMP. METABOLIC PANEL 2021 15:13:00 Maddie Cardoza [facility]

[facility]



                ([id number]                                         [facility]

 

                CBC WITH DIFF   2021 15:13:00 Maddie Cardoza [facility]

 

                N-TERMINAL PRO-BNP 2021 15:13:00 Maddie Cardoza [facility]

 

                COVID-19 (ID NOW RAPID 2021 15:13:00 Maddie Cardoza U

[facility]



                TESTING)                                        Medical Branch

 

                COVID-19 (ID NOW RAPID 2021 15:13:00 Maddie Cardoza U

[facility]



                TESTING)                                        Medical Branch

 

                CBC WITH DIFF   2021 15:13:00 Maddie Cardoza [facility]

 

                COMP. METABOLIC PANEL 2021 15:13:00 Maddie Cardoza Un

[facility]



                ([id number])                                         [facility]

 

                TROPONIN I      2021 15:13:00 Maddie Cardoza [facility]

 

                LIPASE          2021 15:13:00 Maddie Cardoza [facility]

 

                N-TERMINAL PRO-BNP 2021 15:13:00 Maddie Cardoza [facility]



                                                                Medical Branch

 

                FERRITIN SERUM  2021 15:13:00 Marcy Rosa

f [place]



                                                                Medical [place]

 

                CONSENT/REFUSAL FOR 2021 14:32:16 Doctor Richi [facility]



                DIAGNOSIS AND TREATMENT                 [place]         [facility] 

Branch

 

                NOTICE OF PRIVACY 2021 14:31:32 Doctor Richi, [facility]



                PRACTICES                       [place]         [facility]

 

                EMERGENCY DEPARTMENT 2021 05:01:00 Doctor Richi [facility]



                DOCUMENTS                       [place]         [facility]

 

                AGREEMENTS AUTHORIZATIONS 2021 05:01:00 Doctor Richi,

 [facility]



                AND IRREVOCABLE                 [place]         [facility] Branch



                ASSIGNMENTS (FORM 2001)                                 

 

                AGREEMENTS AUTHORIZATIONS 2021 05:01:00 Doctor Richi

 [facility]



                AND IRREVOCABLE                 [place]         [facility] Branch



                ASSIGNMENTS (FORM 2001)                                 

 

                DISCLOSURE AND CONSENT, 2021 05:01:00 Doctor Unassigned, [facility]



                MEDICAL AND SURGICAL                 No Name         Medical Bra

[facility]



                PROCEDURES                                      

 

                EMERGENCY DEPARTMENT 2021 05:01:00 Doctor Unassigned, [facility]



                DOCUMENTS                       [place]         [facility]

 

                HOSPITAL ADMISSION 2021 05:01:00 Doctor Richi [facility]



                                                [place]         [facility] Branch







Plan of Care







             Planned Activity Planned Date Details      Comments     Source

 

             Future Scheduled Test 2022-10-01 00:00:00 IMM Influenza            

  [facility]



                                       Seasonal (>/= 19 yrs)              



                                       [code = IMM Influenza              



                                       Seasonal (>/= 19              



                                       yrs)]                     

 

             Future Scheduled Test 2022-10-01 00:00:00 IMM Influenza            

  [facility]



                                       Seasonal (>/= 19 yrs)              



                                       [code = IMM Influenza              



                                       Seasonal (>/= 19              



                                       yrs)]                     

 

             Future Scheduled Test 2022-10-01 00:00:00 IMM Influenza            

  [facility]



                                       Seasonal (>/= 19 yrs)              



                                       [code = IMM Influenza              



                                       Seasonal (>/= 19              



                                       yrs)]                     

 

             Future Scheduled Test 2021 00:00:00 COVID-19 Vaccine (2 -    

          [facility]



                                       Pfizer series) [code              



                                       = COVID-19 Vaccine (2              



                                       - Pfizer series)]              

 

             Future Scheduled Test 2021 00:00:00 COVID-19 Vaccine (2 -    

          [facility]



                                       Pfizer series) [code              



                                       = COVID-19 Vaccine (2              



                                       - Pfizer series)]              

 

             Future Scheduled Test 2021 00:00:00 COVID-19 Vaccine (2 -    

          [facility]



                                       Pfizer series) [code              



                                       = COVID-19 Vaccine (2              



                                       - Pfizer series)]              

 

             Future Scheduled Test 2018 00:00:00 Screening for            

  [facility]



                                       malignant neoplasm of              



                                       colon (procedure)              



                                       [code = 016962220]              

 

             Future Scheduled Test 2018 00:00:00 Screening for            

  [facility]



                                       malignant neoplasm of              



                                       colon (procedure)              



                                       [code = 836987035]              

 

             Future Scheduled Test 2018 00:00:00 Screening for            

  [facility]



                                       malignant neoplasm of              



                                       colon (procedure)              



                                       [code = 259188586]              

 

             Future Scheduled Test 2018 00:00:00 Screening for            

  [facility]



                                       malignant neoplasm of              



                                       colon (procedure)              



                                       [code = 382390850]              

 

             Future Scheduled Test 2018 00:00:00 Screening for            

  [facility]



                                       malignant neoplasm of              



                                       colon (procedure)              



                                       [code = 028556844]              

 

             Future Scheduled Test 2018 00:00:00 Screening for            

  [facility]



                                       malignant neoplasm of              



                                       colon (procedure)              



                                       [code = 192964836]              

 

             Future Scheduled Test 2018 00:00:00 Screening for            

  [facility]



                                       malignant neoplasm of              



                                       colon (procedure)              



                                       [code = 694930069]              

 

             Future Scheduled Test 2018 00:00:00 Screening for            

  [facility]



                                       malignant neoplasm of              



                                       colon (procedure)              



                                       [code = 175421212]              

 

             Future Scheduled Test 2018 00:00:00 Screening for            

  [facility]



                                       malignant neoplasm of              



                                       colon (procedure)              



                                       [code = 084542177]              

 

             Future Scheduled Test 2018 00:00:00 Screening for            

  [facility]



                                       malignant neoplasm of              



                                       colon (procedure)              



                                       [code = 051632699]              

 

             Future Scheduled Test 2018 00:00:00 Screening for            

  [facility]



                                       malignant neoplasm of              



                                       colon (procedure)              



                                       [code = 119408391]              

 

             Future Scheduled Test 2018 00:00:00 Screening for            

  [facility]



                                       malignant neoplasm of              



                                       colon (procedure)              



                                       [code = 364339618]              

 

             Future Scheduled Test 2018 00:00:00 Screening for            

  [facility]



                                       malignant neoplasm of              



                                       colon (procedure)              



                                       [code = 755324652]              

 

             Future Scheduled Test 2018 00:00:00 Screening for            

  [facility]



                                       malignant neoplasm of              



                                       colon (procedure)              



                                       [code = 821270821]              

 

             Future Scheduled Test 2018 00:00:00 Screening for            

  [facility]



                                       malignant neoplasm of              



                                       colon (procedure)              



                                       [code = 478482983]              

 

             Future Scheduled Test 1968 00:00:00 Fluoride Varnish         

     [facility]



                                       [code = Fluoride              



                                       Varnish]                  







Encounters







        Start   End     Encounter Admission Attending Care    Care    Encounter 

Source



        Date/Time Date/Time Type    Type    Clinicians Facility Department ID   

   

 

        2023         Outpatient                 [facility]     [id number]

 [place]



        09[place]11:43                                                 [id number] [facility]

 

        2023         Outpatient                 [facility]     [id number]

 [place]



        14:16:55                                                 [id number] [facility]

 

        2022         Inpatient                 [facility]     [id number] H

arris



        15:57:19                                                         Health

 

        2022         Inpatient         MAGALLON, ARSH [facility]     [id number]

[address] [place] [address]



        00:00:00                                                         Health

 

        2022         Inpatient         MAY,    [facility]     [id number] H

arris



        13:01:40                         [facility]                         Healt



 

        2022         Inpatient 1       MAY,    [facility]     [id number] H

arris



        15:43:00                         [facility]



 

        2023 Inpatient ER      DULCE MARIA [facility] [id number]

[id number] [facility]



        07:50:00 15:35:00                 [facility]                            

 

        2023 Transition         MAK Joyce  1.2.840.114 [id number]

[id number] [facility]



        00:00:00 00:00:00 of Care         Gogo CALZADA   350.1.13.10        

 ity [facility]   4.2.7.2.686         [place]

[place]



                                                        [phone]         Medi

[place]



                                                        403             Branch

 

        2023 Inpatient        REYNA [facility]     [id number]

[id number] [facility]



        16:36:00 18:36:00                 [facility]                           ity [facility]

 

        2023 [facility]         Lucia Pike  1.

2.840.114 

[id number]                               [facility]



        16:36:00 18:36:00 Encounter         Israel Deluna   350.1.13.

10         ity [facility] 4.2.7.2.686     

    [place]



                                                        [phone]         Medi

tyler



                                                        096             Branch

 

        2022-10-13 2022-10-13 [facility]     [id number] 

[place]



        00:00:00 00:00:00                 Grecia Polk



 

        2022-10-04 2022-10-04 [facility]     [id number]

[id number] [place]



        00:00:00 00:00:00                 [facility]

 

        2022 Refpaty Thapa,  [facility]     [id number] 

[place]



        00:00:00 00:00:00                 Marguerite Foster

[facility]

 

        [facility]     [id number] 

[place]



        00:00:00 00:00:00                 Grecia Polk



 

        2022 Outpatient                 [facility]     [id number]

94 [place]



        10:28:40 10:33:15                                                 [facility]

 

        [facility]22 [facility]         [facility]     [id number]

35 [place]



        00:00:00 00:00:00                 SANTIAGO Polk



 

        2022 Outpatient                 [facility]     [id number]

18 [place]



        00:00:00 00:00:00                                                 [facility]

 

        2022 Office          MAY,    [facility]     [id number] 

[place]



        12:48:07 13:35:29 Visit           MARCY Polk



 

        2022 Outpatient                 [facility]     [id number]

18 [place]



        00:00:00 00:00:00                                                 [facility]

 

        2022 Outpatient                 [facility]     [facility]     [id number]

[facility]



        00:00:00 00:00:00                                         [id number] 



                                                                8       

 

        2022 2022-07-15 [facility]     [id number]

 [place]



        15:43:00 14:31:00 Encounter         MARCY Sykes

[facility]

 

        2022 Anesthesia         ARSH MAGALLON [facility]     [id number]

[id number] [place]



        00:00:00 16:26:00 Event                                           Health

 

        2022 Inpatient                 [facility]     [id number]

0 [place]



        18:31:54 19:34:23                                                 Health

 

        2022 Inpatient                 [facility]     [id number]

4 [place]



        13:44:32 13:44:38                                                 Health

 

        2022 Inpatient                 [facility]     [id number]

2 [place]



        10:59:56 11:29:56                                                 Health

 

        2022 Inpatient         MAY,    [facility]     [id number]

6 [place]



        10:08:10 10:38:10                 THADDAEUS                         Heal

th

 

        2022 Inpatient         MAY,    [facility]     [id number]

3 [place]



        20:00:54 21:22:51                 THADDAEUS                         Heal



 

        2022 Inpatient                 [facility]     [id number]

2 [place]



        20:00:50 21:22:24                                                 [facility]

 

        2022 Inpatient         MAY,    [facility]     [id number]

1 [place]



        18:54:35 20:07:31                 [facility]

 

        2022 Emergency         MALOOK, [facility]     [id number]

2 [place]



        11:20:42 11:32:12                 [facility]

 

        2021-08-10 2021-08-10 Outpatient         ALI, ANATOLIYLUIS ANTONIO [facility]     [id number]

[id number] [place]



        00:00:00 00:00:00                                                 Health

 

        2021 Emergency         Jean Latham  1.2.840.11

4 [id number] 

Univers



        10:22:00 14:00:00                 Robert Snell   350.1.13.10    

     ity of



                                                [facility] 4.2.7.2.686         [place]ominik

as



                                                        [phone]         Medi

tyler



                                                        093             Branch

 

        2021 Emergency X               [facility]    ERT     [id number]

22 Univers



        10:20:00 10:20:00                                                 ity of



                                                                        [facility]

 

        2021-06-10 2021-06-10 Transition         Mak Vizcarra  1.2.840.114 [id number]

[id number] Univers



        00:00:00 00:00:00 of Care         Rubina   Al   350.1.13.10         it

y of



                                                [place]   4.2.7.2.686         [place]

[place]



                                                        [phone]         Medi

tyler



                                                        403             Branch

 

        2021 [facility]         Maddie Cardoza 1.2.840.1 [id number]

[id number] 

[id number]                                Univers



        09:44:00 18:00:00 Encounter         Amy Caldera 73076.1.1        

         ity of



                                                3.104.2.7                 [place]



                                                .[id number]                 Medica

l



                                                .8                      Branch

 

        2021 Emergency X               [facility]    ERT     [id number]

17 Univers



        09:39:00 09:39:00                                                 ity of



                                                                        [facility]

 

        2021 Travel                  1.2.840.1 1.2.106.873 [id number]

8206 Univers



        00:00:00 00:00:00                         64225.1.1 350.1.13.10         

ity of



                                                3.104.2.7 4.2.7.3.698         [place]

[place]



                                                .[id number] 084.8           Medica

l



                                                .8                      [place]

 

        2021 Orders          Doctor  1.2.840.2 [id number]

[id number] Univers



        00:00:00 00:00:00 Only            Unassigned, 22084.1.1                 

ity of



                                        No Name 3.104.2.7                 [place]



                                                .[id number]                 Medica

l



                                                .8                      [place]







Results







           Test Description Test Time  Test Comments Results    Result Comments 

Source









                    FUNGUS CULTURE + SMEAR 2023 08:30:43 









                      Test Item  Value      Reference Range Interpretation Comme

nts









             CULTURE (BEAKER) (test code = 1095) No fungus isolated in 28 days  

                         

 

             FUNGUS SMEAR (BEAKER) (test code = 1406) No fungal elements seen   

                        



MISCELLANEOUS LAB [id number] 14:47:09





             Test Item    Value        Reference Range Interpretation Comments

 

             SCAN RESULT (test code = 7468134)                                  

      See attachment



ANAEROBIC [id number] 00:41:05





             Test Item    Value        Reference Range Interpretation Comments

 

             CULTURE (BEAKER) (test No anaerobes isolated                       

    



             code = 1095)                                        



BASIC METABOLIC [id number] 06:39:01





             Test Item    Value        Reference Range Interpretation Comments

 

             SODIUM (BEAKER) 134 meq/L    136-145      L            



             (test code = 381)                                        

 

             POTASSIUM    4.1 meq/L    3.5-5.1                   



             (BEAKER) (test                                        



             code = 379)                                         

 

             CHLORIDE (BEAKER) 103 meq/L                        



             (test code = 382)                                        

 

             CO2 (BEAKER) 26 meq/L     22-29                     



             (test code = 355)                                        

 

             BLOOD UREA   8 mg/dL      7-21                      



             NITROGEN (BEAKER)                                        



             (test code = 354)                                        

 

             CREATININE   0.62 mg/dL   0.57-1.25                 



             (BEAKER) (test                                        



             code = 358)                                         

 

             GLUCOSE RANDOM 111 mg/dL           H            



             (BEAKER) (test                                        



             code = 652)                                         

 

             CALCIUM (BEAKER) 7.5 mg/dL    8.4-10.2     L            



             (test code = 697)                                        

 

             EGFR (BEAKER) 112                                     Interpretatio

n of eGFR



             (test code = mL/min/1.73                            values  Stage D

escription



             1092)        sq m                                   Result G1 Shaunna

l or high



                                                                 >=90 G2 Mildly 

decreased



                                                                 60-89 G3a Mildl

y to



                                                                 moderately 45-5

9 G3b



                                                                 Moderately to s

everely



                                                                 30-44 G4 Severl

y decreased



                                                                 15-29 G5 Kidney

 failure



                                                                 <15Reported eGF

R is based



                                                                 on the CKD-EPI 





                                                                 equation that d

oes not use



                                                                 a race



                                                                 coefficientEsti

mated GFR



                                                                 is not as accur

ate as



                                                                 Creatinine Tabitha

cynthia in



                                                                 predicting glom

erular



                                                                 filtration rate

. Estimated



                                                                 GFR is not appl

icable for



                                                                 dialysis patien

ts



 ID - WILLIAM WSpecimen slightly [id number] 06:37:31





             Test Item    Value        Reference Range Interpretation Comments

 

             MAGNESIUM (BEAKER) (test code = 1.6 mg/dL    1.6-2.6               

    



             627)                                                



 ID [place] THOAMS WCBC W/PLT COUNT &amp; AUTO [id number] 06:06:27





             Test Item    Value        Reference Range Interpretation Comments

 

             WHITE BLOOD CELL COUNT (BEAKER) 3.8 K/ L     3.5-10.5              

    



             (test code = 775)                                        

 

             RED BLOOD CELL COUNT (BEAKER) 2.41 M/ L    4.63-6.08    L          

  



             (test code = 761)                                        

 

             HEMOGLOBIN (BEAKER) (test code = 7.7 GM/DL    13.7-17.5    L       

     



             410)                                                

 

             HEMATOCRIT (BEAKER) (test code = 23.8 %       40.1-51.0    L       

     



             411)                                                

 

             MEAN CORPUSCULAR VOLUME (BEAKER) 99 fL        79-92        H       

     



             (test code = 753)                                        

 

             MEAN CORPUSCULAR HEMOGLOBIN 32.0 pg      25.7-32.2                 



             (BEAKER) (test code = 751)                                        

 

             MEAN CORPUSCULAR HEMOGLOBIN CONC 32.4 GM/DL   32.3-36.5            

     



             (BEAKER) (test code = 752)                                        

 

             RED CELL DISTRIBUTION WIDTH 18.6 %       11.6-14.4    H            



             (BEAKER) (test code = 412)                                        

 

             PLATELET COUNT (BEAKER) (test code 42 K/CU MM   150-450      L     

       



             = 756)                                              

 

             MEAN PLATELET VOLUME (BEAKER) 11.6 fL      9.4-12.4                

  



             (test code = 754)                                        

 

             NUCLEATED RED BLOOD CELLS (BEAKER) 0 /100 WBC   0-0                

       



             (test code = 413)                                        

 

             NEUTROPHILS RELATIVE PERCENT 58 %                                  

 



             (BEAKER) (test code = 429)                                        

 

             LYMPHOCYTES RELATIVE PERCENT 20 %                                  

 



             (BEAKER) (test code = 430)                                        

 

             MONOCYTES RELATIVE PERCENT 17 %                                   



             (BEAKER) (test code = 431)                                        

 

             EOSINOPHILS RELATIVE PERCENT 5 %                                   

 



             (BEAKER) (test code = 432)                                        

 

             BASOPHILS RELATIVE PERCENT 0 %                                    



             (BEAKER) (test code = 437)                                        

 

             NEUTROPHILS ABSOLUTE COUNT 2.18 K/ L    1.78-5.38                 



             (BEAKER) (test code = 670)                                        

 

             LYMPHOCYTES ABSOLUTE COUNT 0.76 K/ L    1.32-3.57    L            



             (BEAKER) (test code = 414)                                        

 

             MONOCYTES ABSOLUTE COUNT (BEAKER) 0.63 K/ L    0.30-0.82           

      



             (test code = 415)                                        

 

             EOSINOPHILS ABSOLUTE COUNT 0.18 K/ L    0.04-0.54                 



             (BEAKER) (test code = 416)                                        

 

             BASOPHILS ABSOLUTE COUNT (BEAKER) 0.00 K/ L    0.01-0.08    L      

      



             (test code = 417)                                        

 

             IMMATURE GRANULOCYTES-RELATIVE 1.10 %       0.00-1.00    H         

   



             PERCENT (BEAKER) (test code =                                      

  



             2801)                                               



BASIC METABOLIC [id number] 04:14:02





             Test Item    Value        Reference Range Interpretation Comments

 

             SODIUM (BEAKER) 134 meq/L    136-145      L            



             (test code = 381)                                        

 

             POTASSIUM    4.1 meq/L    3.5-5.1                   



             (BEAKER) (test                                        



             code = 379)                                         

 

             CHLORIDE (BEAKER) 103 meq/L                        



             (test code = 382)                                        

 

             CO2 (BEAKER) 26 meq/L     22-29                     



             (test code = 355)                                        

 

             BLOOD UREA   9 mg/dL      7-21                      



             NITROGEN (BEAKER)                                        



             (test code = 354)                                        

 

             CREATININE   0.65 mg/dL   0.57-1.25                 



             (BEAKER) (test                                        



             code = 358)                                         

 

             GLUCOSE RANDOM 126 mg/dL           H            



             (BEAKER) (test                                        



             code = 652)                                         

 

             CALCIUM (BEAKER) 7.6 mg/dL    8.4-10.2     L            



             (test code = 697)                                        

 

             EGFR (BEAKER) 110                                     Interpretatio

n of eGFR



             (test code = mL/min/1.73                            values Stage De

scription



             1092)        sq m                                   Result G1 Shaunna

l or high



                                                                 >=90 G2 Mildly 

decreased



                                                                 60-89 G3a Mildl

y to



                                                                 moderately 45-5

9 G3b



                                                                 Moderately to s

everely



                                                                 30-44 G4 Severl

y decreased



                                                                  15-29 G5 Kidne

y failure



                                                                 <15Reported eGF

R is based



                                                                 on the CKD-EPI 





                                                                 equation that d

oes not use



                                                                 a race



                                                                 coefficientEsti

mated GFR



                                                                 is not as accur

ate as



                                                                 Creatinine Tabitha

cynthia in



                                                                 predicting glom

erular



                                                                 filtration rate

. Estimated



                                                                 GFR is not appl

icable for



                                                                 dialysis patien

ts



 ID - BVSpecimen slightly [id number] 04:11:10





             Test Item    Value        Reference Range Interpretation Comments

 

             MAGNESIUM (BEAKER) (test code = 1.5 mg/dL    1.6-2.6      L        

    



             627)                                                



 ID - BVCBC W/PLT COUNT &amp; AUTO [id number] 04:00:10





             Test Item    Value        Reference Range Interpretation Comments

 

             WHITE BLOOD CELL COUNT (BEAKER) 3.6 K/ L     3.5-10.5              

    



             (test code = 775)                                        

 

             RED BLOOD CELL COUNT (BEAKER) 2.42 M/ L    4.63-6.08    L          

  



             (test code = 761)                                        

 

             HEMOGLOBIN (BEAKER) (test code = 7.6 GM/DL    13.7-17.5    L       

     



             410)                                                

 

             HEMATOCRIT (BEAKER) (test code = 24.0 %       40.1-51.0    L       

     



             411)                                                

 

             MEAN CORPUSCULAR VOLUME (BEAKER) 99 fL        79-92        H       

     



             (test code = 753)                                        

 

             MEAN CORPUSCULAR HEMOGLOBIN 31.4 pg      25.7-32.2                 



             (BEAKER) (test code = 751)                                        

 

             MEAN CORPUSCULAR HEMOGLOBIN CONC 31.7 GM/DL   32.3-36.5    L       

     



             (BEAKER) (test code = 752)                                        

 

             RED CELL DISTRIBUTION WIDTH 18.7 %       11.6-14.4    H            



             (BEAKER) (test code = 412)                                        

 

             PLATELET COUNT (BEAKER) (test code 44 K/CU MM   150-450      L     

       



             = 756)                                              

 

             MEAN PLATELET VOLUME (BEAKER) 12.5 fL      9.4-12.4     H          

  



             (test code = 754)                                        

 

             NUCLEATED RED BLOOD CELLS (BEAKER) 0 /100 WBC   0-0                

       



             (test code = 413)                                        

 

             NEUTROPHILS RELATIVE PERCENT 57 %                                  

 



             (BEAKER) (test code = 429)                                        

 

             LYMPHOCYTES RELATIVE PERCENT 19 %                                  

 



             (BEAKER) (test code = 430)                                        

 

             MONOCYTES RELATIVE PERCENT 18 %                                   



             (BEAKER) (test code = 431)                                        

 

             EOSINOPHILS RELATIVE PERCENT 5 %                                   

 



             (BEAKER) (test code = 432)                                        

 

             BASOPHILS RELATIVE PERCENT 0 %                                    



             (BEAKER) (test code = 437)                                        

 

             NEUTROPHILS ABSOLUTE COUNT 2.02 K/ L    1.78-5.38                 



             (BEAKER) (test code = 670)                                        

 

             LYMPHOCYTES ABSOLUTE COUNT 0.68 K/ L    1.32-3.57    L            



             (BEAKER) (test code = 414)                                        

 

             MONOCYTES ABSOLUTE COUNT (BEAKER) 0.63 K/ L    0.30-0.82           

      



             (test code = 415)                                        

 

             EOSINOPHILS ABSOLUTE COUNT 0.17 K/ L    0.04-0.54                 



             (BEAKER) (test code = 416)                                        

 

             BASOPHILS ABSOLUTE COUNT (BEAKER) 0.00 K/ L    0.01-0.08    L      

      



             (test code = 417)                                        

 

             IMMATURE GRANULOCYTES-RELATIVE 1.70 %       0.00-1.00    H         

   



             PERCENT (BEAKER) (test code =                                      

  



             2801)                                               



[id number] 07:20:31





             Test Item    Value        Reference Range Interpretation Comments

 

             MAGNESIUM (BEAKER) 1.8 mg/dL    1.6-2.6                   Specimen 

slightly



             (test code = 627)                                        hemolyzed



 ID - MARCOCBC W/PLT COUNT &amp; AUTO [id number] 06:57:51





             Test Item    Value        Reference Range Interpretation Comments

 

             WHITE BLOOD CELL COUNT 2.9 K/ L     3.5-10.5     L            



             (BEAKER) (test code =                                        



             775)                                                

 

             RED BLOOD CELL COUNT 2.40 M/ L    4.63-6.08    L            



             (BEAKER) (test code =                                        



             761)                                                

 

             HEMOGLOBIN (BEAKER) 7.7 GM/DL    13.7-17.5    L            



             (test code = 410)                                        

 

             HEMATOCRIT (BEAKER) 24.0 %       40.1-51.0    L            



             (test code = 411)                                        

 

             MEAN CORPUSCULAR VOLUME 100 fL       79-92        H            



             (BEAKER) (test code =                                        



             753)                                                

 

             MEAN CORPUSCULAR 32.1 pg      25.7-32.2                 



             HEMOGLOBIN (BEAKER)                                        



             (test code = 751)                                        

 

             MEAN CORPUSCULAR 32.1 GM/DL   32.3-36.5    L            



             HEMOGLOBIN CONC                                        



             (BEAKER) (test code =                                        



             752)                                                

 

             RED CELL DISTRIBUTION 18.6 %       11.6-14.4    H            



             WIDTH (BEAKER) (test                                        



             code = 412)                                         

 

             PLATELET COUNT (BEAKER) 43 K/CU MM   150-450      L            



             (test code = 756)                                        

 

             MEAN PLATELET VOLUME                                        Unable 

to report due



             (BEAKER) (test code =                                        to abn

ormal Platelet



             754)                                                population



                                                                 distribution.

 

             NUCLEATED RED BLOOD 0 /100 WBC   0-0                       



             CELLS (BEAKER) (test                                        



             code = 413)                                         

 

             NEUTROPHILS RELATIVE 51 %                                   



             PERCENT (BEAKER) (test                                        



             code = 429)                                         

 

             LYMPHOCYTES RELATIVE 23 %                                   



             PERCENT (BEAKER) (test                                        



             code = 430)                                         

 

             MONOCYTES RELATIVE 24 %                                   



             PERCENT (BEAKER) (test                                        



             code = 431)                                         

 

             EOSINOPHILS RELATIVE 1 %                                    



             PERCENT (BEAKER) (test                                        



             code = 432)                                         

 

             BASOPHILS RELATIVE 0 %                                    



             PERCENT (BEAKER) (test                                        



             code = 437)                                         

 

             NEUTROPHILS ABSOLUTE 1.47 K/ L    1.78-5.38    L            



             COUNT (BEAKER) (test                                        



             code = 670)                                         

 

             LYMPHOCYTES ABSOLUTE 0.67 K/ L    1.32-3.57    L            



             COUNT (BEAKER) (test                                        



             code = 414)                                         

 

             MONOCYTES ABSOLUTE 0.71 K/ L    0.30-0.82                 



             COUNT (BEAKER) (test                                        



             code = 415)                                         

 

             EOSINOPHILS ABSOLUTE 0.03 K/ L    0.04-0.54    L            



             COUNT (BEAKER) (test                                        



             code = 416)                                         

 

             BASOPHILS ABSOLUTE 0.00 K/ L    0.01-0.08    L            



             COUNT (BEAKER) (test                                        



             code = 417)                                         

 

             IMMATURE     1.00 %       0.00-1.00                 



             GRANULOCYTES-RELATIVE                                        



             PERCENT (BEAKER) (test                                        



             code = 2801)                                        



SURGICALLY OBTAINED CULTURE + GRAM [id number] 13:25:13





             Test Item    Value        Reference Range Interpretation Comments

 

             CULTURE (BEAKER) (test code No growth                              



             = 1095)                                             

 

             GRAM STAIN RESULT (BEAKER) 4+ WBCs                                



             (test code = 1123)                                        

 

             GRAM STAIN RESULT (BEAKER) No organisms seen                       

    



             (test code = 90541)                                        



HEPATIC FUNCTION [id number] 13:15:37





             Test Item    Value        Reference Range Interpretation Comments

 

             TOTAL PROTEIN (BEAKER) (test code = 5.5 gm/dL    6.0-8.3      L    

        



             770)                                                

 

             ALBUMIN (BEAKER) (test code = 1145) 1.8 g/dL     3.5-5.0      L    

        

 

             BILIRUBIN TOTAL (BEAKER) (test code 3.2 mg/dL    0.2-1.2      H    

        



             = 377)                                              

 

             BILIRUBIN DIRECT (BEAKER) (test 1.9 mg/dL    0.1-0.5      H        

    



             code = 706)                                         

 

             ALKALINE PHOSPHATASE (BEAKER) (test 142 U/L                  

        



             code = 346)                                         

 

             AST (SGOT) (BEAKER) (test code = 66 U/L       5-34         H       

     



             353)                                                

 

             ALT (SGPT) (BEAKER) (test code = 39 U/L       6-55                 

     



             347)                                                



 ID - MARCOSpecimen slightly ictericBLOOD [id number] 11:00:32





             Test Item    Value        Reference Range Interpretation Comments

 

             CULTURE (BEAKER) (test No growth in 5 days                         

  



             code = 1095)                                        



[id number] 07:16:25





             Test Item    Value        Reference Range Interpretation Comments

 

             MAGNESIUM (BEAKER) (test code = 2.1 mg/dL    1.6-2.6               

    



             627)                                                



 ID - BSCBC W/PLT COUNT &amp; AUTO [id number] 06:59:48





             Test Item    Value        Reference Range Interpretation Comments

 

             WHITE BLOOD CELL COUNT 2.8 K/ L     3.5-10.5     L            



             (BEAKER) (test code =                                        



             775)                                                

 

             RED BLOOD CELL COUNT 2.62 M/ L    4.63-6.08    L            



             (BEAKER) (test code =                                        



             761)                                                

 

             HEMOGLOBIN (BEAKER) 8.3 GM/DL    13.7-17.5    L            



             (test code = 410)                                        

 

             HEMATOCRIT (BEAKER) 26.1 %       40.1-51.0    L            



             (test code = 411)                                        

 

             MEAN CORPUSCULAR VOLUME 100 fL       79-92        H            



             (BEAKER) (test code =                                        



             753)                                                

 

             MEAN CORPUSCULAR 31.7 pg      25.7-32.2                 



             HEMOGLOBIN (BEAKER)                                        



             (test code = 751)                                        

 

             MEAN CORPUSCULAR 31.8 GM/DL   32.3-36.5    L            



             HEMOGLOBIN CONC                                        



             (BEAKER) (test code =                                        



             752)                                                

 

             RED CELL DISTRIBUTION 18.6 %       11.6-14.4    H            



             WIDTH (BEAKER) (test                                        



             code = 412)                                         

 

             PLATELET COUNT (BEAKER) 41 K/CU MM   150-450      L            



             (test code = 756)                                        

 

             MEAN PLATELET VOLUME                                        Unable 

to report due



             (BEAKER) (test code =                                        to abn

ormal Platelet



             754)                                                population



                                                                 distribution.

 

             NUCLEATED RED BLOOD 0 /100 WBC   0-0                       



             CELLS (BEAKER) (test                                        



             code = 413)                                         

 

             NEUTROPHILS RELATIVE 58 %                                   



             PERCENT (BEAKER) (test                                        



             code = 429)                                         

 

             LYMPHOCYTES RELATIVE 15 %                                   



             PERCENT (BEAKER) (test                                        



             code = 430)                                         

 

             MONOCYTES RELATIVE 25 %                                   



             PERCENT (BEAKER) (test                                        



             code = 431)                                         

 

             EOSINOPHILS RELATIVE 0 %                                    



             PERCENT (BEAKER) (test                                        



             code = 432)                                         

 

             BASOPHILS RELATIVE 0 %                                    



             PERCENT (BEAKER) (test                                        



             code = 437)                                         

 

             NEUTROPHILS ABSOLUTE 1.65 K/ L    1.78-5.38    L            



             COUNT (BEAKER) (test                                        



             code = 670)                                         

 

             LYMPHOCYTES ABSOLUTE 0.43 K/ L    1.32-3.57    L            



             COUNT (BEAKER) (test                                        



             code = 414)                                         

 

             MONOCYTES ABSOLUTE 0.72 K/ L    0.30-0.82                 



             COUNT (BEAKER) (test                                        



             code = 415)                                         

 

             EOSINOPHILS ABSOLUTE 0.00 K/ L    0.04-0.54    L            



             COUNT (BEAKER) (test                                        



             code = 416)                                         

 

             BASOPHILS ABSOLUTE 0.00 K/ L    0.01-0.08    L            



             COUNT (BEAKER) (test                                        



             code = 417)                                         

 

             IMMATURE     1.40 %       0.00-1.00    H            



             GRANULOCYTES-RELATIVE                                        



             PERCENT (BEAKER) (test                                        



             code = 2801)                                        



ANAEROBIC [id number] 02:13:49





             Test Item    Value        Reference Range Interpretation Comments

 

             CULTURE (BEAKER) (test No anaerobes isolated                       

    



             code = 1095)                                        



U/S, ABDOMINAL, [id number] 18:25:00Reason for exam:-&gt;cirrhosis
************************************************************[facility] 1967 Sex: 
M************************************************************FINAL REPORT 
PATIENT ID: [id number] Abdominal ultrasound dated 2023 Clinical information: 
cirrhosis, eval vessels Comment: Real-time transabdominal ultrasound was 
performed. Liver is atrophic and measures 8.7 cm in length. The echogenicity of 
the liver is increased with irregular margin consistent with cirrhosis. No focal
lesion is noted in the liver. Spleen is enlarged measuring 12.8 cm in length.
Gallbladder is contracted. No gallstone is present. No biliary dilatation is 
seen. Common bile duct measures 5 mm in diameter. Main portal vein measures 11 
mm in diameter. Pancreas is suboptimal visualized. Right kidney measures 12.0 x 
5.5 x 6.1 cm. Left kidney measures 11.8 x 5.7 x 6.1 cm. Echogenicity of both 
kidney is normal. No hydronephrosis or solid mass seen in either kidney. No cyst
is seen in either kidney. Small ascites present in the abdomen. There is small 
left pleural effusion. Abdominal aorta is normal in caliber. The IVC is patent. 
Real-time Color and Spectral doppler ultrasound of the abdomen was performed. 
Main portal vein measures 1.1 cm in diameter. There is hepatopedal flow in the 
main portal vein with velocity 45.8 cm/sec. Right and left portal veins are 
patent. Proper hepatic artery, right hepatic artery, and left hepatic artery are
patent with resistive indices 0.8, 0.7, and 0.75 respectively. IVC, Hepatic 
venous confluence, right HV, middle HV and left HV are patent. Impression: 1. 
Cirrhosis with splenomegaly2. No suspicious hepatic mass.3. Small ascites and 
left pleural effusion.4. Patent hepatic arteries and portal veins. Signed: Bobbi العلي [facility] Verified Date/Time: 2023 18:25:01 Electronically signed by:
BOBBI العلي M.D. on 2023 06:25 PMU/S, DUPLEX, [id number] 18:25:00
Reason for exam:-&gt;cirrhosis, eval vessels
************************************************************[facility] 1967 Sex: 
M************************************************************FINAL REPORT 
PATIENT ID: [id number] Abdominal ultrasound dated 2023 Clinical information: 
cirrhosis, eval vessels Comment: Real-time transabdominal ultrasound was 
performed. Liver is atrophic and measures 8.7 cm in length. The echogenicity of 
the liver is increased with irregular margin consistent with cirrhosis. No focal
lesion is noted in the liver. Spleen is enlarged measuring 12.8 cm in length.
Gallbladder is contracted. No gallstone is present. No biliary dilatation is 
seen. Common bile duct measures 5 mm in diameter. Main portal vein measures 11 
mm in diameter. Pancreas is suboptimal visualized. Right kidney measures 12.0 x 
5.5 x 6.1 cm. Left kidney measures 11.8 x 5.7 x 6.1 cm. Echogenicity of both 
kidney is normal. No hydronephrosis or solid mass seen in either kidney. No cyst
is seen in either kidney. Small ascites present in the abdomen. There is small 
left pleural effusion. Abdominal aorta is normal in caliber. The IVC is patent. 
Real-time Color and Spectral doppler ultrasound of the abdomen was performed. 
Main portal vein measures 1.1 cm in diameter. There is hepatopedal flow in the 
main portal vein with velocity 45.8 cm/sec. Right and left portal veins are 
patent. Proper hepatic artery, right hepatic artery, and left hepatic artery are
patent with resistive indices 0.8, 0.7, and 0.75 respectively. IVC, Hepatic 
venous confluence, right HV, middle HV and left HV are patent. Impression: 1. 
Cirrhosis with splenomegaly2. No suspicious hepatic mass.3. Small ascites and 
left pleural effusion.4. Patent hepatic arteries and portal veins. S[facility]: [facility] Verified Date/Time: 2023 18:25:01 Electronically signed by:
BOBBI العلي M.D. on 2023 06:25 PMALPHA FETOPROTEIN (AFP), TUMOR MARKER
2023 07:42:03





             Test Item    Value        Reference Range Interpretation Comments

 

             ALPHA-FETOPROTEIN (BEAKER) (test code < ng/mL      <10.0           

          



             = 1094)                                             



 ID - ADMINHEPATITIS B SURFACE [id number] 07:41:36





             Test Item    Value        Reference Range Interpretation Comments

 

             HEPATITIS B SURFACE ANTIBODY < mIU/mL     <8.0                     

 



             (BEAKER) (test code = 647)                                        



 ID - ADMINHEPATITIS A ANTIBODY, [id number] 07:41:31





             Test Item    Value        Reference Range Interpretation Comments

 

             HEPATITIS A IGG ANTIBODY (BEAKER) Reactive     Nonreactive  A      

      



             (test code = 2797)                                        



 ID - ADMINHEPATITIS B CORE ANTIBODY, [id number] 07:40:24





             Test Item    Value        Reference Range Interpretation Comments

 

             HEPATITIS B CORE TOTAL ANTIBODY Nonreactive  Nonreactive           

    



             (BEAKER) (test code = 497)                                        



 ID - ADMINHEPATITIS B SURFACE [id number] 07:40:23





             Test Item    Value        Reference Range Interpretation Comments

 

             HEPATITIS B SURFACE ANTIGEN (2) Nonreactive  Nonreactive           

    



             (BEAKER) (test code = 2585)                                        



Specimen is considered negative for HBsAg.HEPATITIS C [id number] 
07:40:23





             Test Item    Value        Reference Range Interpretation Comments

 

             HEPATITIS C ANTIBODY (BEAKER) Nonreactive  Nonreactive             

  



             (test code = 367)                                        



 ID - [id number] 05:45:39





             Test Item    Value        Reference Range Interpretation Comments

 

             MAGNESIUM (BEAKER) (test code = 1.9 mg/dL    1.6-2.6               

    



             627)                                                



 ID - ADMINCBC W/PLT COUNT &amp; AUTO [id number] 05:19:17





             Test Item    Value        Reference Range Interpretation Comments

 

             WHITE BLOOD CELL COUNT (BEAKER) 4.6 K/ L     3.5-10.5              

    



             (test code = 775)                                        

 

             RED BLOOD CELL COUNT (BEAKER) 2.22 M/ L    4.63-6.08    L          

  



             (test code = 761)                                        

 

             HEMOGLOBIN (BEAKER) (test code = 6.9 GM/DL    13.7-17.5    L       

     



             410)                                                

 

             HEMATOCRIT (BEAKER) (test code = 21.7 %       40.1-51.0    L       

     



             411)                                                

 

             MEAN CORPUSCULAR VOLUME (BEAKER) 98 fL        79-92        H       

     



             (test code = 753)                                        

 

             MEAN CORPUSCULAR HEMOGLOBIN 31.1 pg      25.7-32.2                 



             (BEAKER) (test code = 751)                                        

 

             MEAN CORPUSCULAR HEMOGLOBIN CONC 31.8 GM/DL   32.3-36.5    L       

     



             (BEAKER) (test code = 752)                                        

 

             RED CELL DISTRIBUTION WIDTH 18.4 %       11.6-14.4    H            



             (BEAKER) (test code = 412)                                        

 

             PLATELET COUNT (BEAKER) (test code 44 K/CU MM   150-450      L     

       



             = 756)                                              

 

             MEAN PLATELET VOLUME (BEAKER) 12.4 fL      9.4-12.4                

  



             (test code = 754)                                        

 

             NUCLEATED RED BLOOD CELLS (BEAKER) 0 /100 WBC   0-0                

       



             (test code = 413)                                        

 

             NEUTROPHILS RELATIVE PERCENT 70 %                                  

 



             (BEAKER) (test code = 429)                                        

 

             LYMPHOCYTES RELATIVE PERCENT 10 %                                  

 



             (BEAKER) (test code = 430)                                        

 

             MONOCYTES RELATIVE PERCENT 19 %                                   



             (BEAKER) (test code = 431)                                        

 

             EOSINOPHILS RELATIVE PERCENT 0 %                                   

 



             (BEAKER) (test code = 432)                                        

 

             BASOPHILS RELATIVE PERCENT 0 %                                    



             (BEAKER) (test code = 437)                                        

 

             NEUTROPHILS ABSOLUTE COUNT 3.17 K/ L    1.78-5.38                 



             (BEAKER) (test code = 670)                                        

 

             LYMPHOCYTES ABSOLUTE COUNT 0.45 K/ L    1.32-3.57    L            



             (BEAKER) (test code = 414)                                        

 

             MONOCYTES ABSOLUTE COUNT (BEAKER) 0.84 K/ L    0.30-0.82    H      

      



             (test code = 415)                                        

 

             EOSINOPHILS ABSOLUTE COUNT 0.00 K/ L    0.04-0.54    L            



             (BEAKER) (test code = 416)                                        

 

             BASOPHILS ABSOLUTE COUNT (BEAKER) 0.01 K/ L    0.01-0.08           

      



             (test code = 417)                                        

 

             IMMATURE GRANULOCYTES-RELATIVE 1.80 %       0.00-1.00    H         

   



             PERCENT (BEAKER) (test code =                                      

  



             2801)                                               



VANCOMYCIN LEVEL, [id number] 14:12:35





             Test Item    Value        Reference Range Interpretation Comments

 

             VANCOMYCIN TROUGH (BEAKER) (test 1.4 ug/mL    10.0-20.0    L       

     



             code = 522)                                         



 ID - ADMINBODY FLUID CULTURE + GRAM [id number] 13:02:52





             Test Item    Value        Reference    Interpretation Comments



                                       Range                     

 

             CULTURE (BEAKER) BETA HEMOLYTIC              A            1+ Beta-h

emolytic



             (test code = STREPTOCOCCUS GROUP                           streptoc

occus group



             1095)        B                                      B, by serologic

al



                                                                 grouping

 

             Ceftriaxone (test              See_Comment  S             [Automate

d message]



             code = 52)                                          The system [employer]



                                                                 generated this



                                                                 result transmit

michelle



                                                                 reference range

:



                                                                 Susceptible 0-0

.5 ,



                                                                 No Interpretati

ons



                                                                 Established <0 

or



                                                                 >.5. The refere

nce



                                                                 range was not u

sed



                                                                 to interpret th

is



                                                                 result as



                                                                 normal/abnormal

.

 

             Penicillin G              See_Comment  S             [Automated mes

elif]



             (test code = 3)                                        The system [employer]

[facility]



                                                                 generated this



                                                                 result transmit

michelle



                                                                 reference range

:



                                                                 Susceptible 0-0

.12 ,



                                                                 No Interpretati

ons



                                                                 Established <0 

or



                                                                 >.. The referen

ce



                                                                 range was not u

sed



                                                                 to interpret th

is



                                                                 result as



                                                                 normal/abnormal

.

 

             Vancomycin (test              See_Comment  S             [Automated

 message]



             code = 13)                                          The system [employer]



                                                                 generated this



                                                                 result transmit

michelle



                                                                 reference range

:



                                                                 Susceptible 0-1

 , No



                                                                 Interpretations



                                                                 Established <0 

or >1



                                                                 . The reference



                                                                 range was not u

sed



                                                                 to interpret th

is



                                                                 result as



                                                                 normal/abnormal

.

 

             GRAM STAIN RESULT 4+ WBCs                                



             (BEAKER) (test                                        



             code = 1123)                                        

 

             GRAM STAIN RESULT <1+ gram positive                           



             (BEAKER) (test cocci in pairs                           



             code = 196171)                                        



BASIC METABOLIC [id number] 05:21:50





             Test Item    Value        Reference Range Interpretation Comments

 

             SODIUM (BEAKER) 133 meq/L    136-145      L            



             (test code = 381)                                        

 

             POTASSIUM    4.9 meq/L    3.5-5.1                   Specimen slight

ly



             (BEAKER) (test                                        hemolyzed



             code = 379)                                         

 

             CHLORIDE (BEAKER) 106 meq/L                        



             (test code = 382)                                        

 

             CO2 (BEAKER) 18 meq/L     22-29        L            



             (test code = 355)                                        

 

             BLOOD UREA   13 mg/dL     7-21                      



             NITROGEN (BEAKER)                                        



             (test code = 354)                                        

 

             CREATININE   0.55 mg/dL   0.57-1.25    L            Specimen slight

ly



             (BEAKER) (test                                        hemolyzed



             code = 358)                                         

 

             GLUCOSE RANDOM 138 mg/dL           H            



             (BEAKER) (test                                        



             code = 652)                                         

 

             CALCIUM (BEAKER) 7.0 mg/dL    8.4-10.2     L            



             (test code = 697)                                        

 

             EGFR (BEAKER) 115                                     Interpretatio

n of eGFR



             (test code = mL/min/1.73                            values Stage De

scription



             1092)        sq m                                   Result G1 Shaunna

l or high



                                                                 >=90 G2 Mildly 

decreased



                                                                 60-89 G3a  Mild

ly to



                                                                 moderately 45-5

9 G3b



                                                                 Moderately to s

everely



                                                                 30-44 G4 Severl

y decreased



                                                                 15-29 G5 Kidney

 failure



                                                                 <15Reported eGF

R is based



                                                                 on the CKD-EPI 





                                                                 equation that d

oes not use



                                                                 a race



                                                                 coefficientEsti

mated GFR



                                                                 is not as accur

ate as



                                                                 Creatinine Tabitha marie in



                                                                 predicting glom

erular



                                                                 filtration rate

. Estimated



                                                                 GFR is not appl

icable for



                                                                 dialysis patien

ts



 ID - MMSpecimen moderately [id number] 05:19:56





             Test Item    Value        Reference Range Interpretation Comments

 

             MAGNESIUM (BEAKER) 1.8 mg/dL    1.6-2.6                   Specimen 

slightly



             (test code = 627)                                        hemolyzed



 ID - [id number] 05:19:56





             Test Item    Value        Reference Range Interpretation Comments

 

             PHOSPHORUS (BEAKER) 4.6 mg/dL    2.3-4.7                   Specimen

 slightly



             (test code = 604)                                        hemolyzed



 ID - MMCBC W/PLT COUNT &amp; AUTO [id number] 05:04:59





             Test Item    Value        Reference Range Interpretation Comments

 

             WHITE BLOOD CELL COUNT (BEAKER) 3.7 K/ L     3.5-10.5              

    



             (test code = 775)                                        

 

             RED BLOOD CELL COUNT (BEAKER) 2.66 M/ L    4.63-6.08    L          

  



             (test code = 761)                                        

 

             HEMOGLOBIN (BEAKER) (test code = 8.4 GM/DL    13.7-17.5    L       

     



             410)                                                

 

             HEMATOCRIT (BEAKER) (test code = 26.1 %       40.1-51.0    L       

     



             411)                                                

 

             MEAN CORPUSCULAR VOLUME (BEAKER) 98 fL        79-92        H       

     



             (test code = 753)                                        

 

             MEAN CORPUSCULAR HEMOGLOBIN 31.6 pg      25.7-32.2                 



             (BEAKER) (test code = 751)                                        

 

             MEAN CORPUSCULAR HEMOGLOBIN CONC 32.2 GM/DL   32.3-36.5    L       

     



             (BEAKER) (test code = 752)                                        

 

             RED CELL DISTRIBUTION WIDTH 18.3 %       11.6-14.4    H            



             (BEAKER) (test code = 412)                                        

 

             PLATELET COUNT (BEAKER) (test code 56 K/CU MM   150-450      L     

       



             = 756)                                              

 

             MEAN PLATELET VOLUME (BEAKER) 11.2 fL      9.4-12.4                

  



             (test code = 754)                                        

 

             NUCLEATED RED BLOOD CELLS (BEAKER) 0 /100 WBC   0-0                

       



             (test code = 413)                                        

 

             NEUTROPHILS RELATIVE PERCENT 79 %                                  

 



             (BEAKER) (test code = 429)                                        

 

             LYMPHOCYTES RELATIVE PERCENT 14 %                                  

 



             (BEAKER) (test code = 430)                                        

 

             MONOCYTES RELATIVE PERCENT 6 %                                    



             (BEAKER) (test code = 431)                                        

 

             EOSINOPHILS RELATIVE PERCENT 0 %                                   

 



             (BEAKER) (test code = 432)                                        

 

             BASOPHILS RELATIVE PERCENT 0 %                                    



             (BEAKER) (test code = 437)                                        

 

             NEUTROPHILS ABSOLUTE COUNT 2.89 K/ L    1.78-5.38                 



             (BEAKER) (test code = 670)                                        

 

             LYMPHOCYTES ABSOLUTE COUNT 0.52 K/ L    1.32-3.57    L            



             (BEAKER) (test code = 414)                                        

 

             MONOCYTES ABSOLUTE COUNT (BEAKER) 0.22 K/ L    0.30-0.82    L      

      



             (test code = 415)                                        

 

             EOSINOPHILS ABSOLUTE COUNT 0.00 K/ L    0.04-0.54    L            



             (BEAKER) (test code = 416)                                        

 

             BASOPHILS ABSOLUTE COUNT (BEAKER) 0.00 K/ L    0.01-0.08    L      

      



             (test code = 417)                                        

 

             IMMATURE GRANULOCYTES-RELATIVE 1.40 %       0.00-1.00    H         

   



             PERCENT (BEAKER) (test code =                                      

  



             2801)                                               



MRSA [id number] 12:59:38





             Test Item    Value        Reference Range Interpretation Comments

 

             CULTURE (BEAKER) (test code No MRSA isolated                       

    



             = 1095)                                             



(CELLAVISION MANUAL DIFF)2023 09:01:51





             Test Item    Value        Reference Range Interpretation Comments

 

             NEUTROPHILS - REL 76 %                                   



             (CELLAVISION)(BEAKER) (test code                                   

     



             = 2816)                                             

 

             LYMPHOCYTES - REL 6 %                                    



             (CELLAVISION)(BEAKER) (test code                                   

     



             = 2817)                                             

 

             MONOCYTES - REL 6 %                                    



             (CELLAVISION)(BEAKER) (test code                                   

     



             = 2818)                                             

 

             EOSINOPHILS - REL 3 %                                    



             (CELLAVISION)(BEAKER) (test code                                   

     



             = 2819)                                             

 

             BASOPHILS - REL 1 %                                    



             (CELLAVISION)(BEAKER) (test code                                   

     



             = 2820)                                             

 

             METAMYELOCYTES - REL 1 %          0-0          H            



             (CELLAVISION)(BEAKER) (test code                                   

     



             = 2821)                                             

 

             MYELOCYTES - REL 1 %          0-0          H            



             (CELLAVISION)(BEAKER) (test code                                   

     



             = 2822)                                             

 

             BANDS - REL (CELLAVISION)(BEAKER) 4 %          0-10                

      



             (test code = 2826)                                        

 

             ATYPICAL LYMPHOCYTES - REL 1 %          0-0          H            



             (CELLAVISION)(BEAKER) (test code                                   

     



             = 2829)                                             

 

             NEUTROPHILS - ABS 4.33 K/ul    1.78-5.38                 



             (CELLAVISION)(BEAKER) (test code                                   

     



             = 2830)                                             

 

             LYMPHOCYTES - ABS 0.34 K/ul    1.32-3.57    L            



             (CELLAVISION)(BEAKER) (test code                                   

     



             = 2831)                                             

 

             MONOCYTES - ABS 0.34 K/uL    0.30-0.82                 



             (CELLAVISION)(BEAKER) (test code                                   

     



             = 2832)                                             

 

             EOSINOPHILS - ABS 0.17 K/uL    0.04-0.54                 



             (CELLAVISION)(BEAKER) (test code                                   

     



             = 2834)                                             

 

             BASOPHILS - ABS 0.06 K/uL    0.01-0.08                 



             (CELLAVISION)(BEAKER) (test code                                   

     



             = 2835)                                             

 

             METAMYELOCYTES - ABS 0.06 K/uL    0.00-0.00    H            



             (CELLAVISION)(BEAKER) (test code                                   

     



             = 2836)                                             

 

             MYELOCYTES-ABS 0.06 K/uL    0.00-0.00    H            



             (CELLAVISION)(BEAKER) (test code                                   

     



             = 2837)                                             

 

             BANDS - ABS (CELLAVISION)(BEAKER) 0.23 K/uL    0.00-0.80           

      



             (test code = 2840)                                        

 

             ATYPICAL LYMPHOCYTES - ABS 0.06 K/uL    0.00-0.00    H            



             (CELLAVISION)(BEAKER) (test code                                   

     



             = 1648)                                             

 

             TOTAL COUNTED (BEAKER) (test code 100                              

      



             = 1351)                                             

 

             WBC MORPHOLOGY (BEAKER) (test Normal                               

  



             code = 487)                                         

 

             PLT MORPHOLOGY (BEAKER) (test Normal                               

  



             code = 486)                                         

 

             POLYCHROMATOPHILLIC RBCS(BEAKER) 1+ few                            

     



             (test code = 478)                                        

 

             ANISOCYTOSIS (BEAKER) (test code 2+ moderate                       

     



             = 961)                                              

 

             MACROCYTES (BEAKER) (test code = 1+ few                            

     



             964)                                                

 

             POIKILOCYTES (BEAKER) (test code 2+ moderate                       

     



             = 966)                                              

 

             SCHISTOCYTES (BEAKER) (test code 1+ few                            

     



             = 765)                                              

 

             OVALOCYTES (BEAKER) (test code = 1+ few                            

     



             477)                                                

 

             ARTIFACT (CELLAVISION)(BEAKER) Present                             

   



             (test code = 4752)                                        

 

             PLATELET CONCENTRATION Decreased                              



             (CELLAVISION)(BEAKER) (test code                                   

     



             = 6268)                                             



 ID - [id number] ID - Katie OverholtUser comments: Slide comments:CBC 
W/PLT COUNT &amp; AUTO [id number] 09:01:48





             Test Item    Value        Reference Range Interpretation Comments

 

             WHITE BLOOD CELL COUNT (BEAKER) 5.7 K/ L     3.5-10.5              

    



             (test code = 775)                                        

 

             RED BLOOD CELL COUNT (BEAKER) 2.53 M/ L    4.63-6.08    L          

  



             (test code = 761)                                        

 

             HEMOGLOBIN (BEAKER) (test code = 7.9 GM/DL    13.7-17.5    L       

     



             410)                                                

 

             HEMATOCRIT (BEAKER) (test code = 24.5 %       40.1-51.0    L       

     



             411)                                                

 

             MEAN CORPUSCULAR VOLUME (BEAKER) 97 fL        79-92        H       

     



             (test code = 753)                                        

 

             MEAN CORPUSCULAR HEMOGLOBIN 31.2 pg      25.7-32.2                 



             (BEAKER) (test code = 751)                                        

 

             MEAN CORPUSCULAR HEMOGLOBIN CONC 32.2 GM/DL   32.3-36.5    L       

     



             (BEAKER) (test code = 752)                                        

 

             RED CELL DISTRIBUTION WIDTH 19.5 %       11.6-14.4    H            



             (BEAKER) (test code = 412)                                        

 

             PLATELET COUNT (BEAKER) (test code 52 K/CU MM   150-450      L     

       



             = 756)                                              

 

             MEAN PLATELET VOLUME (BEAKER) 10.5 fL      9.4-12.4                

  



             (test code = 754)                                        

 

             NUCLEATED RED BLOOD CELLS (BEAKER) 0 /100 WBC   0-0                

       



             (test code = 413)                                        



BASIC METABOLIC [id number] 05:30:22





             Test Item    Value        Reference Range Interpretation Comments

 

             SODIUM (BEAKER) 133 meq/L    136-145      L            



             (test code = 381)                                        

 

             POTASSIUM    3.9 meq/L    3.5-5.1                   



             (BEAKER) (test                                        



             code = 379)                                         

 

             CHLORIDE (BEAKER) 107 meq/L                        



             (test code = 382)                                        

 

             CO2 (BEAKER) 22 meq/L     22-29                     



             (test code = 355)                                        

 

             BLOOD UREA   10 mg/dL     7-21                      



             NITROGEN (BEAKER)                                        



             (test code = 354)                                        

 

             CREATININE   0.59 mg/dL   0.57-1.25                 



             (BEAKER) (test                                        



             code = 358)                                         

 

             GLUCOSE RANDOM 85 mg/dL                         



             (BEAKER) (test                                        



             code = 652)                                         

 

             CALCIUM (BEAKER) 7.1 mg/dL    8.4-10.2     L            



             (test code = 697)                                        

 

             EGFR (BEAKER) 113                                     Interpretatio

n of eGFR



             (test code = mL/min/1.73                            values Stage De

scription



             1092)        sq m                                   Result  G1 Norm

al or high



                                                                 >=90 G2 Mildly 

decreased



                                                                 60-89 G3a Mildl

y to



                                                                 moderately 45-5

9 G3b



                                                                 Moderately to s

everely



                                                                 30-44 G4 Severl

y decreased



                                                                 15-29 G5 Kidney

 failure



                                                                 <15Reported eGF

R is based



                                                                 on the CKD-EPI 





                                                                 equation that d

oes not use



                                                                 a race



                                                                 coefficientEsti

mated GFR



                                                                 is not as accur

ate as



                                                                 Creatinine Tabitha

cynthia in



                                                                 predicting glom

erular



                                                                 filtration rate

. Estimated



                                                                 GFR is not appl

icable for



                                                                 dialysis patien

ts



 ID - WILLIAM JACOBpecimen moderately ictericHEPATIC FUNCTION [id number]
05:30:15





             Test Item    Value        Reference Range Interpretation Comments

 

             TOTAL PROTEIN (BEAKER) (test code = 5.0 gm/dL    6.0-8.3      L    

        



             770)                                                

 

             ALBUMIN (BEAKER) (test code = 1145) 1.7 g/dL     3.5-5.0      L    

        

 

             BILIRUBIN TOTAL (BEAKER) (test code 4.9 mg/dL    0.2-1.2      H    

        



             = 377)                                              

 

             BILIRUBIN DIRECT (BEAKER) (test 2.1 mg/dL    0.1-0.5      H        

    



             code = 706)                                         

 

             ALKALINE PHOSPHATASE (BEAKER) (test 91 U/L                   

        



             code = 346)                                         

 

             AST (SGOT) (BEAKER) (test code = 48 U/L       5-34         H       

     



             353)                                                

 

             ALT (SGPT) (BEAKER) (test code = 20 U/L       6-55                 

     



             347)                                                



 ID - WILLIAM WSpecimen moderately [id number] 05:29:05





             Test Item    Value        Reference Range Interpretation Comments

 

             PHOSPHORUS (BEAKER) (test code = 2.2 mg/dL    2.3-4.7      L       

     



             604)                                                



 ID - WILLIAM [id number] 05:29:04





             Test Item    Value        Reference Range Interpretation Comments

 

             MAGNESIUM (BEAKER) (test code = 1.8 mg/dL    1.6-2.6               

    



             627)                                                



 ID - WILLIAM WPROTHROMBIN [id number] 05:05:58





             Test Item    Value        Reference Range Interpretation Comments

 

             PROTIME (BEAKER) (test code = 27.5 seconds 11.9-14.2    H          

  



             759)                                                

 

             INR (BEAKER) (test code = 370) 2.75         <=5.90                 

   



RECOMMENDED COUMADIN/WARFARIN INR THERAPY RANGESSTANDARD DOSE: 2.0 - 3.0 
Includes: PROPHYLAXIS for venous thrombosis, systemic embolization; TREATMENT 
for venous thrombosis and/or pulmonary embolus.HIGH RISK: Target INR is 2.5-3.5 
for patients with mechanical heart valves.POCT-GLUCOSE [id number] 18:49:26





             Test Item    Value        Reference Range Interpretation Comments

 

             POC-GLUCOSE METER 158 mg/dL           H            : TESTED A

[facility] [address]



             (BEAKER) (test code =                                        [place],



             [address]38)                                               [id number]:



                                                                 /Techni

melanie ID



                                                                 = [id number] for Ch

ryan,



                                                                 Cunthia



CALCIUM, [id number] 16:00:32





             Test Item    Value        Reference Range Interpretation Comments

 

             CALCIUM IONIZED (BEAKER) (test 1.07 mmol/L  1.12-1.27    L         

   



             code = 698)                                         

 

             PH, BLOOD (BEAKER) (test code = 7.47                               

    



             1810)                                               



BASIC METABOLIC [id number] 14:08:29





             Test Item    Value        Reference Range Interpretation Comments

 

             SODIUM (BEAKER) 134 meq/L    136-145      L            



             (test code = 381)                                        

 

             POTASSIUM    3.4 meq/L    3.5-5.1      L            



             (BEAKER) (test                                        



             code = 379)                                         

 

             CHLORIDE (BEAKER) 113 meq/L           H            



             (test code = 382)                                        

 

             CO2 (BEAKER) 18 meq/L     22-29        L            



             (test code = 355)                                        

 

             BLOOD UREA   8 mg/dL      7-21                      



             NITROGEN (BEAKER)                                        



             (test code = 354)                                        

 

             CREATININE   0.47 mg/dL   0.57-1.25    L            



             (BEAKER) (test                                        



             code = 358)                                         

 

             GLUCOSE RANDOM 95 mg/dL                         



             (BEAKER) (test                                        



             code = 652)                                         

 

             CALCIUM (BEAKER) 6.0 mg/dL    8.4-10.2     LL           



             (test code = 697)                                        

 

             EGFR (BEAKER) 119                                     Interpretatio

n of eGFR



             (test code = mL/min/1.73                            values Stage De

scription



             1092)        sq m                                   Result G1 Shaunna

l or high



                                                                 >=90  G2 Mildly

 decreased



                                                                 60-89 G3a Mildl

y to



                                                                 moderately 45-5

9 G3b



                                                                 Moderately to s

everely



                                                                 30-44 G4 Severl

y decreased



                                                                 15-29 G5 Kidney

 failure



                                                                 <15Reported eGF

R is based



                                                                 on the CKD-EPI 





                                                                 equation that d

oes not use



                                                                 a race



                                                                 coefficientEsti

mated GFR



                                                                 is not as accur

ate as



                                                                 Creatinine Tabitha

cynthia in



                                                                 predicting glom

erular



                                                                 filtration rate

. Estimated



                                                                 GFR is not appl

icable for



                                                                 dialysis patien

ts



 ID - MMSpecimen moderately icteric(CELLAVISION MANUAL DIFF)2023 
14:04:53





             Test Item    Value        Reference Range Interpretation Comments

 

             NEUTROPHILS - REL 89 %                                   



             (CELLAVISION)(BEAKER) (test code                                   

     



             = 2816)                                             

 

             LYMPHOCYTES - REL 2 %                                    



             (CELLAVISION)(BEAKER) (test code                                   

     



             = 2817)                                             

 

             MONOCYTES - REL 3 %                                    



             (CELLAVISION)(BEAKER) (test code                                   

     



             = 2818)                                             

 

             BASOPHILS - REL 1 %                                    



             (CELLAVISION)(BEAKER) (test code                                   

     



             = 2820)                                             

 

             METAMYELOCYTES - REL 1 %          0-0          H            



             (CELLAVISION)(BEAKER) (test code                                   

     



             = 2821)                                             

 

             BANDS - REL (CELLAVISION)(BEAKER) 4 %          0-10                

      



             (test code = 2826)                                        

 

             NEUTROPHILS - ABS 4.63 K/ul    1.78-5.38                 



             (CELLAVISION)(BEAKER) (test code                                   

     



             = 2830)                                             

 

             LYMPHOCYTES - ABS 0.10 K/ul    1.32-3.57    L            



             (CELLAVISION)(BEAKER) (test code                                   

     



             = 2831)                                             

 

             MONOCYTES - ABS 0.16 K/uL    0.30-0.82    L            



             (CELLAVISION)(BEAKER) (test code                                   

     



             = 2832)                                             

 

             BASOPHILS - ABS 0.05 K/uL    0.01-0.08                 



             (CELLAVISION)(BEAKER) (test code                                   

     



             = 2835)                                             

 

             METAMYELOCYTES - ABS 0.05 K/uL    0.00-0.00    H            



             (CELLAVISION)(BEAKER) (test code                                   

     



             = 2836)                                             

 

             BANDS - ABS (CELLAVISION)(BEAKER) 0.21 K/uL    0.00-0.80           

      



             (test code = 2840)                                        

 

             TOTAL COUNTED (BEAKER) (test code 100                              

      



             = 1351)                                             

 

             WBC MORPHOLOGY (BEAKER) (test Normal                               

  



             code = 487)                                         

 

             PLT MORPHOLOGY (BEAKER) (test Normal                               

  



             code = 486)                                         

 

             POLYCHROMATOPHILLIC RBCS(BEAKER) 1+ few                            

     



             (test code = 478)                                        

 

             ANISOCYTOSIS (BEAKER) (test code 2+ moderate                       

     



             = 961)                                              

 

             MACROCYTES (BEAKER) (test code = 2+ moderate                       

     



             964)                                                

 

             POIKILOCYTES (BEAKER) (test code 2+ moderate                       

     



             = 966)                                              

 

             ELLIPTOCYTES (BEAKER) (test code 1+ few                            

     



             = 962)                                              

 

             OVALOCYTES (BEAKER) (test code = 2+ moderate                       

     



             477)                                                

 

             ARTIFACT (CELLAVISION)(BEAKER) Present                             

   



             (test code = 3432)                                        

 

             HELMET CELLS 1+ few                                 



             (CELLAVISION)(BEAKER) (test code                                   

     



             = 3434)                                             

 

             PLATELET CONCENTRATION Decreased                              



             (CELLAVISION)(BEAKER) (test code                                   

     



             = 3438)                                             



 ID - [id number] ID - Joellen Barragan comments: Slide comments:
CBC W/PLT COUNT &amp; AUTO [id number] 14:04:52





             Test Item    Value        Reference Range Interpretation Comments

 

             WHITE BLOOD CELL COUNT 5.2 K/ L     3.5-10.5                  



             (BEAKER) (test code =                                        



             775)                                                

 

             RED BLOOD CELL COUNT 2.47 M/ L    4.63-6.08    L            



             (BEAKER) (test code =                                        



             761)                                                

 

             HEMOGLOBIN (BEAKER) 7.6 GM/DL    13.7-17.5    L            



             (test code = 410)                                        

 

             HEMATOCRIT (BEAKER) 23.9 %       40.1-51.0    L            



             (test code = 411)                                        

 

             MEAN CORPUSCULAR 97 fL        79-92        H            



             VOLUME (BEAKER) (test                                        



             code = 753)                                         

 

             MEAN CORPUSCULAR 30.8 pg      25.7-32.2                 



             HEMOGLOBIN (BEAKER)                                        



             (test code = 751)                                        

 

             MEAN CORPUSCULAR 31.8 GM/DL   32.3-36.5    L            



             HEMOGLOBIN CONC                                        



             (BEAKER) (test code =                                        



             752)                                                

 

             RED CELL DISTRIBUTION 20.2 %       11.6-14.4    H            



             WIDTH (BEAKER) (test                                        



             code = 412)                                         

 

             PLATELET COUNT 56 K/CU MM   150-450      L            Discordant fr

om



             (BEAKER) (test code =                                        previo

us results.



             756)                                                Clinical correl

ation



                                                                 suggested.

 

             MEAN PLATELET VOLUME 11.5 fL      9.4-12.4                  



             (BEAKER) (test code =                                        



             754)                                                

 

             NUCLEATED RED BLOOD 0 /100 WBC   0-0                       



             CELLS (BEAKER) (test                                        



             code = 413)                                         



[id number] 14:04:39





             Test Item    Value        Reference Range Interpretation Comments

 

             PHOSPHORUS (BEAKER) (test code = 2.3 mg/dL    2.3-4.7              

     



             604)                                                



 ID - [id number] 14:04:38





             Test Item    Value        Reference Range Interpretation Comments

 

             MAGNESIUM (BEAKER) (test code = 1.7 mg/dL    1.6-2.6               

    



             627)                                                



 ID - [id number] 13:42:33





             Test Item    Value        Reference Range Interpretation Comments

 

             FIBRINOGEN LEVEL (BEAKER) (test 180 mg/dl    225-434      L        

    



             code = 658)                                         



VANCOMYCIN LEVEL, [id number] 12:41:03





             Test Item    Value        Reference Range Interpretation Comments

 

             VANCOMYCIN TROUGH (BEAKER) (test 7.3 ug/mL    10.0-20.0    L       

     



             code = 522)                                         



 ID - NOLVIA BPOCT-GLUCOSE [id number] 12:22:01





             Test Item    Value        Reference Range Interpretation Comments

 

             POC-GLUCOSE METER 130 mg/dL           H            : TESTED A

[facility] [address]



             (BEAKER) (test code                                        [facility],



             = 1538)                                             [id number]:



                                                                 /Techni

melanie ID =



                                                                 [id number] for Lorelei miranda



                                                                 (contract), Nickie

tamara



CREATINE KINASE (CK)2023 10:09:48





             Test Item    Value        Reference Range Interpretation Comments

 

             CREATINE KINASE TOTAL (BEAKER) (test 260 U/L             H   

         



             code = 380)                                         



 ID - MMLACTIC ACID, [id number] 10:04:39





             Test Item    Value        Reference Range Interpretation Comments

 

             LACTATE BLOOD VENOUS (2) (BEAKER) 1.84 mmol/L  0.50-2.00           

      



             (test code = 2872)                                        



 ID - MMSpecimen slightly ictericHEPATIC FUNCTION [id number] 
09:54:16





             Test Item    Value        Reference Range Interpretation Comments

 

             TOTAL PROTEIN (BEAKER) (test code = 4.9 gm/dL    6.0-8.3      L    

        



             770)                                                

 

             ALBUMIN (BEAKER) (test code = 1145) 1.6 g/dL     3.5-5.0      L    

        

 

             BILIRUBIN TOTAL (BEAKER) (test code 4.1 mg/dL    0.2-1.2      H    

        



             = 377)                                              

 

             BILIRUBIN DIRECT (BEAKER) (test 1.8 mg/dL    0.1-0.5      H        

    



             code = 706)                                         

 

             ALKALINE PHOSPHATASE (BEAKER) (test 67 U/L                   

        



             code = 346)                                         

 

             AST (SGOT) (BEAKER) (test code = 59 U/L       5-34         H       

     



             353)                                                

 

             ALT (SGPT) (BEAKER) (test code = 19 U/L       6-55                 

     



             347)                                                



 ID - MMSpecimen moderately ictericPOCT-GLUCOSE [id number] 04:18:34





             Test Item    Value        Reference Range Interpretation Comments

 

             POC-GLUCOSE METER 97 mg/dL                         : TESTED A

[facility] [address]



             (BEAKER) (test code =                                        [place]

[place],



             1538)                                               [id number]:



                                                                 /Techni

melanie ID =



                                                                 [id number] for Crista

 Julienne



THROMBOELASTOGRAPH (TEG)2023 04:15:53





             Test Item    Value        Reference Range Interpretation Comments

 

             TEG ACTIVATED CLOTTING TIME 3.6 minutes  4.0-7.0      L            



             (BEAKER) (test code = 1407)                                        

 

             TEG FIBRINOGEN ACTIVITY (BEAKER) 60.7 degrees 61.0-73.0    L       

     



             (test code = 1408)                                        

 

             TEG PLT. AGGREGATION (BEAKER) 47.7 MM      55.0-65.0    L          

  



             (test code = 1409)                                        

 

             TEG FIBRINOLYSIS (BEAKER) (test 0.0 %        0.0-5.0               

    



             code = 1410)                                        

 

             TGH ACTIVATED CLOTTING TIME 3.7 minutes  4.0-7.0      L            



             (BEAKER) (test code = 1411)                                        

 

             TGH FIBRINOGEN ACTIVITY (BEAKER) 62.1 degrees 61.0-73.0            

     



             (test code = 1412)                                        

 

             TGH PLT. AGGREGATION (BEAKER) 43.9 MM      55.0-65.0    L          

  



             (test code = 1413)                                        

 

             TGH FIBRINOLYSIS (BEAKER) (test 0.0 %        0.0-5.0               

    



             code = 1414)                                        



[id number] 04:08:22





             Test Item    Value        Reference Range Interpretation Comments

 

             PHOSPHORUS (BEAKER) (test code = 1.5 mg/dL    2.3-4.7      LL      

     



             604)                                                



 ID - ADMINBASIC METABOLIC [id number] 04:07:31





             Test Item    Value        Reference Range Interpretation Comments

 

             SODIUM (BEAKER) 132 meq/L    136-145      L            



             (test code = 381)                                        

 

             POTASSIUM    3.3 meq/L    3.5-5.1      L            



             (BEAKER) (test                                        



             code = 379)                                         

 

             CHLORIDE (BEAKER) 107 meq/L                        



             (test code = 382)                                        

 

             CO2 (BEAKER) 20 meq/L     22-29        L            



             (test code = 355)                                        

 

             BLOOD UREA   10 mg/dL     7-21                      



             NITROGEN (BEAKER)                                        



             (test code = 354)                                        

 

             CREATININE   0.56 mg/dL   0.57-1.25    L            



             (BEAKER) (test                                        



             code = 358)                                         

 

             GLUCOSE RANDOM 102 mg/dL                        



             (BEAKER) (test                                        



             code = 652)                                         

 

             CALCIUM (BEAKER) 7.1 mg/dL    8.4-10.2     L            



             (test code = 697)                                        

 

             EGFR (BEAKER) 114                                     Interpretatio

n of eGFR



             (test code = mL/min/1.73                            values Stage De

scription



             1092)        sq m                                   Result G1 Shaunna

l or high



                                                                 >=90 G2 Mildly 

decreased



                                                                 60-89 G3a Mildl

y to



                                                                 moderately 45-5

9 G3b



                                                                 Moderately to s

everely



                                                                 30-44  G4 Sever

ly



                                                                 decreased 15-29

 G5 Kidney



                                                                 failure <15Repo

rted eGFR



                                                                 is based on the

 CKD-EPI



                                                                  equation t

hat does



                                                                 not use a race



                                                                 coefficientEsti

mated GFR



                                                                 is not as accur

ate as



                                                                 Creatinine Tabitha

cynthia in



                                                                 predicting glom

erular



                                                                 filtration rate

. Estimated



                                                                 GFR is not appl

icable for



                                                                 dialysis patien

ts



 ID - ADMINSpecimen moderately [id number] 04:01:34





             Test Item    Value        Reference Range Interpretation Comments

 

             MAGNESIUM (BEAKER) (test code = 1.7 mg/dL    1.6-2.6               

    



             627)                                                



 ID - ADMINPT/[id number] 03:01:52





             Test Item    Value        Reference Range Interpretation Comments

 

             PROTIME (BEAKER) (test code = 32.4 seconds 11.9-14.2    H          

  



             759)                                                

 

             INR (BEAKER) (test code = 370) 3.41         <=5.90                 

   

 

             PARTIAL THROMBOPLASTIN TIME 47.6 seconds 22.5-36.0    H            



             (BEAKER) (test code = 760)                                        



RECOMMENDED COUMADIN/WARFARIN INR THERAPY RANGESSTANDARD DOSE: 2.0 - 3.0 
Includes: PROPHYLAXIS for venous thrombosis, systemic embolization; TREATMENT 
for venous thrombosis and/or pulmonary embolus.HIGH RISK: Target INR is 2.5-3.5 
for patients with mechanical heart valves.[id number] 03:01:30





             Test Item    Value        Reference Range Interpretation Comments

 

             FIBRINOGEN LEVEL (BEAKER) (test 184 mg/dl    225-434      L        

    



             code = 658)                                         



CBC W/PLT COUNT &amp; AUTO [id number] 02:50:11





             Test Item    Value        Reference Range Interpretation Comments

 

             WHITE BLOOD CELL COUNT 6.4 K/ L     3.5-10.5                  



             (BEAKER) (test code =                                        



             775)                                                

 

             RED BLOOD CELL COUNT 2.09 M/ L    4.63-6.08    L            



             (BEAKER) (test code =                                        



             761)                                                

 

             HEMOGLOBIN (BEAKER) 6.6 GM/DL    13.7-17.5    L            



             (test code = 410)                                        

 

             HEMATOCRIT (BEAKER) 20.9 %       40.1-51.0    L            



             (test code = 411)                                        

 

             MEAN CORPUSCULAR VOLUME 100 fL       79-92        H            



             (BEAKER) (test code =                                        



             753)                                                

 

             MEAN CORPUSCULAR 31.6 pg      25.7-32.2                 



             HEMOGLOBIN (BEAKER)                                        



             (test code = 751)                                        

 

             MEAN CORPUSCULAR 31.6 GM/DL   32.3-36.5    L            



             HEMOGLOBIN CONC                                        



             (BEAKER) (test code =                                        



             752)                                                

 

             RED CELL DISTRIBUTION 19.6 %       11.6-14.4    H            



             WIDTH (BEAKER) (test                                        



             code = 412)                                         

 

             PLATELET COUNT (BEAKER) 35 K/CU MM   150-450      L            No c

lot. Previous



             (test code = 756)                                        low

 

             MEAN PLATELET VOLUME                                        Unable 

to report due



             (BEAKER) (test code =                                        to abn

ormal Platelet



             754)                                                population



                                                                 distribution.

 

             NUCLEATED RED BLOOD 0 /100 WBC   0-0                       



             CELLS (BEAKER) (test                                        



             code = 413)                                         

 

             NEUTROPHILS RELATIVE 73 %                                   



             PERCENT (BEAKER) (test                                        



             code = 429)                                         

 

             LYMPHOCYTES RELATIVE 12 %                                   



             PERCENT (BEAKER) (test                                        



             code = 430)                                         

 

             MONOCYTES RELATIVE 14 %                                   



             PERCENT (BEAKER) (test                                        



             code = 431)                                         

 

             EOSINOPHILS RELATIVE 0 %                                    



             PERCENT (BEAKER) (test                                        



             code = 432)                                         

 

             BASOPHILS RELATIVE 0 %                                    



             PERCENT (BEAKER) (test                                        



             code = 437)                                         

 

             NEUTROPHILS ABSOLUTE 4.65 K/ L    1.78-5.38                 



             COUNT (BEAKER) (test                                        



             code = 670)                                         

 

             LYMPHOCYTES ABSOLUTE 0.76 K/ L    1.32-3.57    L            



             COUNT (BEAKER) (test                                        



             code = 414)                                         

 

             MONOCYTES ABSOLUTE 0.90 K/ L    0.30-0.82    H            



             COUNT (BEAKER) (test                                        



             code = 415)                                         

 

             EOSINOPHILS ABSOLUTE 0.00 K/ L    0.04-0.54    L            



             COUNT (BEAKER) (test                                        



             code = 416)                                         

 

             BASOPHILS ABSOLUTE 0.02 K/ L    0.01-0.08                 



             COUNT (BEAKER) (test                                        



             code = 417)                                         

 

             IMMATURE     0.90 %       0.00-1.00                 



             GRANULOCYTES-RELATIVE                                        



             PERCENT (BEAKER) (test                                        



             code = 2801)                                        



CALCIUM, [id number] 02:48:49





             Test Item    Value        Reference Range Interpretation Comments

 

             CALCIUM IONIZED (BEAKER) (test 1.04 mmol/L  1.12-1.27    L         

   



             code = 698)                                         

 

             PH, BLOOD (BEAKER) (test code = 7.52                               

    



             1810)                                               



CT, EXTREMITY, LOWER, WITH CONTRAST, [id number] 00:12:00Unlisted Reason for 
Exam - Click Yes and Enter Reason Below-&gt;NoPlease specify:-&gt;KneeSeptic 
arthritis. R/o concominant myositisPlease specify:-&gt;FemurPlease 
specify:-&gt;Tibia/Fibula
************************************************************[facility]  : 1967 Sex: 
M************************************************************FINAL REPORT 
PATIENT ID: [id number] CT, EXTREMITY, LOWER, WITH CONTRAST, LEFT INDICATION: Soft 
tissue infection suspected, thigh, xray done COMPARISON: None TECHNIQUE: Axial 
CT of the left lower extremity with sagittal and coronal reformations. 
Intravascular contrast was administered. DOSE REDUCTION: Dose modulation, 
iterative reconstruction, and/or weight-based adjustment of the mA/kV was 
utilized to reduce the radiation dose to as low as reasonably achievable. 
FINDINGS:There is diffuse circumferential subcutaneous soft tissue swelling of 
the left lower extremity greatest distally. No soft tissue gas or fluid 
collections. The deep compartments are relatively preserved. Moderate left knee 
joint effusion. Included vasculature is unremarkable. No acute fracture or 
dislocation. No focal osseous erosion or aggressive lesion. Escobar catheter 
present within the bladder. IMPRESSION:Diffuse soft tissue swelling of the left 
lower extremity may be related to edema or cellulitis. No soft tissue gas and no
drainable collection. No acute osseous abnormality. There is a left knee joint 
effusion. Signed: Bobbi Real MDReport Verified Date/Time: 2023 00:12:35 
Electronically signed by: BOBBI REAL MD on 2023 12:12 AMPOCT-GLUCOSE 
[id number] 00:07:49





             Test Item    Value        Reference Range Interpretation Comments

 

             POC-GLUCOSE METER 118 mg/dL           H            : TESTED A

[facility] [address]



             (BEAKER) (test code =                                        [facility],



             1538)                                               [id number]:



                                                                 /Techni

melanie ID



                                                                 = [id number] for Roxana Okeefe



RAPID DRUG SCREEN, [id number] 20:38:14





             Test Item    Value        Reference Range Interpretation Comments

 

             BARBITURATE URINE (BEAKER) (test Negative     Negative             

     



             code = 725)                                         

 

             BENZODIAZEPINE SCREEN URINE (BEAKER) Negative     Negative         

         



             (test code = 726)                                        

 

             COCAINE (METAB.) SCREEN (BEAKER) Negative     Negative             

     



             (test code = 1164)                                        

 

             METHADONE SCREEN (BEAKER) (test code Negative     Negative         

         



             = 1436)                                             

 

             OPIATE SCREEN URINE (BEAKER) (test Negative     Negative           

       



             code = 734)                                         

 

             CANNABINOID SCREEN URINE (BEAKER) Negative     Negative            

      



             (test code = 727)                                        

 

             AMPH/METHAMPH SCREEN (BEAKER) (test Negative     Negative          

        



             code = 1438)                                        

 

             PHENCYCLIDINE SCREEN URINE (BEAKER) Negative     Negative          

        



             (test code = 608)                                        

 

             PH UA (BEAKER) (test code = 467) 6.0          5.0-8.0              

     



DRUG CUTOFF CONC.Cocaine 300 ng/mL Cannabinoid 50 ng/mLBenzodiazepine 200 
ng/mLBarbiturate 200 ng/mLPhencyclidine 25 ng/mLOpiate 300 ng/mLMethadone 300 
ng/mLAmphetamine/ 1000 ng/mL MethamphetamineThis assay provides an unconfirmed 
qualitative test result for the clinical management of patients in emergency 
situations. Chain of custody not maintained. Some over-the-counter medications, 
as well as adulterants, may cause inaccurate results. Clinical correlation 
should be applied. A more comprehensive drug screen or confirmation of a 
detected drug may be performed upon request. ID - ADMINURINALYSIS W/ 
REFLEX URINE [id number] 20:27:37





             Test Item    Value        Reference Range Interpretation Comments

 

             COLOR (BEAKER) (test code = 470) Yellow                            

     

 

             CLARITY (BEAKER) (test code = 469) Hazy                            

       

 

             SPECIFIC GRAVITY UA (BEAKER) (test 1.027        1.001-1.035        

       



             code = 468)                                         

 

             PH UA (BEAKER) (test code = 467) 6.0          5.0-8.0              

     

 

             PROTEIN UA (BEAKER) (test code = 20 mg/dL     Negative     A       

     



             464)                                                

 

             GLUCOSE UA (BEAKER) (test code = Negative     Negative             

     



             365)                                                

 

             KETONES UA (BEAKER) (test code = Negative     Negative             

     



             371)                                                

 

             BILIRUBIN UA (BEAKER) (test code = Positive     Negative     A     

       



             462)                                                

 

             BLOOD UA (BEAKER) (test code = 461) Large        Negative     A    

        

 

             NITRITE UA (BEAKER) (test code = Negative     Negative             

     



             465)                                                

 

             LEUKOCYTE ESTERASE UA (BEAKER) (test Negative     Negative         

         



             code = 466)                                         

 

             UROBILINOGEN UA (BEAKER) (test code 3            0.2-1.0      H    

        



             = 463)                                              

 

             RBC UA (BEAKER) (test code = 519) 27 /HPF                          

      

 

             WBC UA (BEAKER) (test code = 520) 3 /HPF                           

      

 

             BACTERIA (BEAKER) (test code = 517) Rare                           

        

 

             SQUAMOUS EPITHELIAL (BEAKER) (test < /HPF                          

       



             code = 516)                                         

 

             SOURCE(BEAKER) (test code = 2795)                                  

      



 ID - [auto] ID - techPOCT-GLUCOSE [id number] 20:05:54





             Test Item    Value        Reference Range Interpretation Comments

 

             POC-GLUCOSE METER 165 mg/dL           H            : TESTED A

T [facility] [address]



             (BEAKER) (test code =                                        [place],



             1538)                                               [id number]:



                                                                 /Techni

melanie ID



                                                                 = [id number] for La

ra,



                                                                 Julienne



BODY FLUID [id number] 18:23:10





             Test Item    Value        Reference Range Interpretation Comments

 

             CRYSTALS, BODY FLUID Calcium pyrophosphate                         

  



             (BEAKER) (test code crystals for pseudogout                        

   



             = 2165)                                             

 

             QUANTITY SEEN Few                                    



             (BEAKER) (test code                                        



             = 2166)                                             

 

             [id number] Dillon Dominguez MD (electronic                     

      



             (BEAKER) (test code signature)                             



             = 2607)                                             



THROMBOELASTOGRAPH (TEG)2023 18:12:17





             Test Item    Value        Reference Range Interpretation Comments

 

             TEG ACTIVATED CLOTTING TIME 8.9 minutes  4.0-7.0      H            



             (BEAKER) (test code = 1407)                                        

 

             TEG FIBRINOGEN ACTIVITY (BEAKER) 50.7 degrees 61.0-73.0    L       

     



             (test code = 1408)                                        

 

             TEG PLT. AGGREGATION (BEAKER) 39.9 MM      55.0-65.0    L          

  



             (test code = 1409)                                        

 

             TEG FIBRINOLYSIS (BEAKER) (test 0.0 %        0.0-5.0               

    



             code = 1410)                                        

 

             TGH ACTIVATED CLOTTING TIME 9.2 minutes  4.0-7.0      H            



             (BEAKER) (test code = 1411)                                        

 

             TGH FIBRINOGEN ACTIVITY (BEAKER) 54.6 degrees 61.0-73.0    L       

     



             (test code = 1412)                                        

 

             TGH PLT. AGGREGATION (BEAKER) 39.5 MM      55.0-65.0    L          

  



             (test code = 1413)                                        

 

             TGH FIBRINOLYSIS (BEAKER) (test 0.0 %        0.0-5.0               

    



             code = 1414)                                        



[id number] 18:02:42





             Test Item    Value        Reference Range Interpretation Comments

 

             PROCALCITONIN (BEAKER) (test code 7.00 ng/mL   <0.05        H      

      



             = 3036)                                             



SEPSIS RISK (ng/mL)Low: 0.05-0.50Intermediate: 0.51-2.00High: &gt;=2.01CREATINE 
KINASE (CK)2023 17:50:27





             Test Item    Value        Reference Range Interpretation Comments

 

             CREATINE KINASE TOTAL (BEAKER) (test 977 U/L             H   

         



             code = 380)                                         



 ID - ADMINCBC (HEMOGRAM ONLY)2023 17:27:14





             Test Item    Value        Reference Range Interpretation Comments

 

             WHITE BLOOD CELL COUNT (BEAKER) 6.7 K/ L     3.5-10.5              

    



             (test code = 775)                                        

 

             RED BLOOD CELL COUNT (BEAKER) 2.26 M/ L    4.63-6.08    L          

  



             (test code = 761)                                        

 

             HEMOGLOBIN (BEAKER) (test code = 7.2 GM/DL    13.7-17.5    L       

     



             410)                                                

 

             HEMATOCRIT (BEAKER) (test code = 23.2 %       40.1-51.0    L       

     



             411)                                                

 

             MEAN CORPUSCULAR VOLUME (BEAKER) 103 fL       79-92        H       

     



             (test code = 753)                                        

 

             MEAN CORPUSCULAR HEMOGLOBIN 31.9 pg      25.7-32.2                 



             (BEAKER) (test code = 751)                                        

 

             MEAN CORPUSCULAR HEMOGLOBIN CONC 31.0 GM/DL   32.3-36.5    L       

     



             (BEAKER) (test code = 752)                                        

 

             RED CELL DISTRIBUTION WIDTH 20.1 %       11.6-14.4    H            



             (BEAKER) (test code = 412)                                        

 

             PLATELET COUNT (BEAKER) (test code 22 K/CU MM   150-450      L     

       



             = 756)                                              

 

             NUCLEATED RED BLOOD CELLS (BEAKER) 0 /100 WBC   0-0                

       



             (test code = 413)                                        



[id number] 16:34:04





             Test Item    Value        Reference Range Interpretation Comments

 

             AMMONIA (BEAKER) (test code = 348) 103 mol/L    18-72        H     

       



 ID - ADMINLACTIC ACID, [id number] 15:30:00





             Test Item    Value        Reference Range Interpretation Comments

 

             LACTATE BLOOD VENOUS (2) (BEAKER) 2.66 mmol/L  0.50-2.00    H      

      



             (test code = 2872)                                        



 ID - MMSpecimen slightly ictericPOCT-GLUCOSE [id number] 15:05:53





             Test Item    Value        Reference Range Interpretation Comments

 

             POC-GLUCOSE METER 108 mg/dL                        : TESTED A

T [facility] [address]



             (BEAKER) (test code =                                        [place],



             1538)                                               [address]:



                                                                 /Techni

melanie ID



                                                                 = [id number] for FO

LUIS ARMANDO BAILEY



CALCIUM, [id number] 15:05:35





             Test Item    Value        Reference Range Interpretation Comments

 

             CALCIUM IONIZED (BEAKER) (test 0.85 mmol/L  1.12-1.27    L         

   



             code = 698)                                         

 

             PH, BLOOD (BEAKER) (test code = 7.43                               

    



             1810)                                               



HEPATITIS PANEL, [id number] 13:08:20





             Test Item    Value        Reference Range Interpretation Comments

 

             HEPATITIS A IGM ANTIBODY (BEAKER) Nonreactive  Nonreactive         

      



             (test code = 498)                                        

 

             HEPATITIS B CORE IGM ANTIBODY Nonreactive  Nonreactive             

  



             (BEAKER) (test code = 645)                                        

 

             HEPATITIS C ANTIBODY (BEAKER) Nonreactive  Nonreactive             

  



             (test code = 367)                                        

 

             HEPATITIS B SURFACE ANTIGEN (2) Nonreactive  Nonreactive           

    



             (BEAKER) (test code = 2585)                                        



 ID - ADMINBODY FLUID CELL COUNT WITH [id number] 13:07:16





             Test Item    Value        Reference Range Interpretation Comments

 

             APPEARANCE FLUID (BEAKER) Turbid       Clear        A            



             (test code = 510)                                        

 

             COLOR FLUID (BEAKER) (test Lorie        Colorless, Straw A         

   



             code = 511)                                         

 

             RBC FLUID (BEAKER) (test code 96591 /cu mm <=1          H          

  



             = 513)                                              

 

             TOTAL NUCLEATED CELL COUNT 822575 /cu mm <=5          H            



             (BEAKER) (test code = 1442)                                        

 

             LINING CELLS/OTHERS DIFF'D 0                                      



             (BEAKER) (test code = 1589)                                        

 

             ADJUSTED WBC FLUID (BEAKER) 262660 /cu mm <=5          H           

 



             (test code = 1691)                                        

 

             LINING CELLS/OTHERS, 0 /cu mm     <=1                       



             CALCULATED (BEAKER) (test code                                     

   



             = 1590)                                             

 

             NEUTROPHILS FLUID (BEAKER) 100 %                                  



             (test code = 1656)                                        

 

             LYMPHS FLUID (BEAKER) (test 0 %                                    



             code = 488)                                         

 

             MONO/MACROPHAGE FLUID (BEAKER) 0 %                                 

   



             (test code = 489)                                        

 

             EOSINOPHILS FLUID (BEAKER) 0 %                                    



             (test code = 491)                                        

 

             BASO FLUID (BEAKER) (test code 0 %                                 

   



             = 492)                                              

 

             CONTAINER BODY FLUID (BEAKER) EDTA Tube                            

  



             (test code = 2873)                                        



HEMOGLOBIN [id number] 12:24:08





             Test Item    Value        Reference Range Interpretation Comments

 

             HEMOGLOBIN A1C < %          See_Comment                [Automated m

essage]



             ELECTROPHORESIS (BEAKER)                                        The

 system which



             (test code = 3811)                                        generated

 this result



                                                                 transmitted ref

erence



                                                                 range: <=5.6%. 

The



                                                                 reference range

 was



                                                                 not used to int

erpret



                                                                 this result as



                                                                 normal/abnormal

.



"The A1c is measured using a NGSP-certified method. HbA1c value equal to or 
greater than 6.5% as thediagnosis cutoff for diabetes. An HbA1c value of 5.7-
6.4% indicates increased risk for diabetes (prediabetes)." ID - ADMURIC 
[id number] 11:33:12





             Test Item    Value        Reference Range Interpretation Comments

 

             URIC ACID (BEAKER) (test code = 3.2 mg/dL    2.6-7.2               

    



             773)                                                



 ID - ADMINSpecimen slightly ictericC-REACTIVE [id number] 
11:33:12





             Test Item    Value        Reference Range Interpretation Comments

 

             C-REACTIVE PROTEIN (BEAKER) (test 4.52 mg/dL   0.00-0.50    H      

      



             code = 676)                                         



 ID - [id number] 11:31:51





             Test Item    Value        Reference Range Interpretation Comments

 

             HAPTOGLOBIN (BEAKER) (test code = < mg/dL             L      

      



             366)                                                



 ID - ADMINRAD, FEMUR, MIN. 2 VIEWS, [id number] 11:30:00Reason for 
exam:-&gt;fall r/o acute fractureShould this be performed at the 
bedside?-&gt;Yes************************************************************[facility]  : 1967 Sex: 
M************************************************************FINAL REPORT 
PATIENT ID: [id number] Exam: RAD, FEMUR, MIN. 2 VIEWS, LEFT Side: Left INDICATION:
fall r/o acute fracture COMPARISON: None FINDINGS:Bones: No acute displaced 
fracture. Osseous alignment is within normal limits. Joints:The joint spaces are
well-maintained. Soft tissues:The soft tissues appear unremarkable. IMPRESSION: 
No acute radiographic abnormality. If symptoms persist or progress please 
consider further evaluation with the MRI without IV contrast, if clinically 
appropriate. Signed: Melida Sanchez MDReport Verified Date/Time: 2023 
11:30:35 Reading Location: [facility] Radiology ReadingRoom Electronically signed by:
MELIDA SANCHEZ MD on 2023 11:30 AMRAD, KNEE, 3 VIEWS, [id number] 
11:02:00Is this procedure to be performed with weight bearing?-&gt;Non-Weight 
BearingReason for exam:-&gt;fall r/o acute fractureShould this be performed at 
the bedside?-&gt;Yes
************************************************************[facility] 1967 Sex: 
M************************************************************FINAL REPORT 
PATIENT ID: [id number] Exam: RAD, KNEE, 3 VIEWS, LEFT Side: INDICATION: fall r/o 
acute fracture COMPARISON: None FINDINGS:Bones: No acute displaced fracture. 
Questionable lucency in the mid patella could be artifactual.Osseous alignment 
is within normal limits. Joints:The joint spaces are well-maintained. Soft 
tissues:The soft tissues appear unremarkable. IMPRESSION: No definitive acute 
radiographic abnormality. Questionable lucency in the mid patella could be 
artifactual. However please consider correlation with point tenderness if 
indicated please consider repeat radiograph of patella. If symptoms persist or 
progress please consider further evaluation with the MRI without IV contrast, if
clinically appropriate. Signed: Melida Sanchez MDReport Verified Date/Time: 
2023 11:02:35 Reading Location: [facility] Radiology Reading Room 
Electronically signed by: MELIDA SANCHEZ MD on 2023 11:02 AMTSH/FREE T4 IF 
[id number] 10:14:33





             Test Item    Value        Reference Range Interpretation Comments

 

             THYROID STIMULATING HORMONE 2.549 uIU/mL 0.350-4.940               



             (BEAKER) (test code = 772)                                        



 ID - MMCOMPREHENSIVE METABOLIC [id number] 10:11:12





             Test Item    Value        Reference Range Interpretation Comments

 

             TOTAL PROTEIN 5.6 gm/dL    6.0-8.3      L            



             (BEAKER) (test                                        



             code = 770)                                         

 

             ALBUMIN (BEAKER) 1.9 g/dL     3.5-5.0      L            



             (test code = 1145)                                        

 

             ALKALINE     84 U/L                           



             PHOSPHATASE                                         



             (BEAKER) (test                                        



             code = 346)                                         

 

             BILIRUBIN TOTAL 4.6 mg/dL    0.2-1.2      H            



             (BEAKER) (test                                        



             code = 377)                                         

 

             SODIUM (BEAKER) 133 meq/L    136-145      L            



             (test code = 381)                                        

 

             POTASSIUM (BEAKER) 3.3 meq/L    3.5-5.1      L            



             (test code = 379)                                        

 

             CHLORIDE (BEAKER) 108 meq/L           H            



             (test code = 382)                                        

 

             CO2 (BEAKER) (test 17 meq/L     22-29        L            



             code = 355)                                         

 

             BLOOD UREA   10 mg/dL     7-21                      



             NITROGEN (BEAKER)                                        



             (test code = 354)                                        

 

             CREATININE   0.70 mg/dL   0.57-1.25                 



             (BEAKER) (test                                        



             code = 358)                                         

 

             GLUCOSE RANDOM 146 mg/dL           H            



             (BEAKER) (test                                        



             code = 652)                                         

 

             CALCIUM (BEAKER) 6.9 mg/dL    8.4-10.2     L            



             (test code = 697)                                        

 

             AST (SGOT)   72 U/L       5-34         H            



             (BEAKER) (test                                        



             code = 353)                                         

 

             ALT (SGPT)   22 U/L       6-55                      



             (BEAKER) (test                                        



             code = 347)                                         

 

             EGFR (BEAKER) 108                                     Interpretatio

n of eGFR



             (test code = 1092) mL/min/1.73                            values St

age Description



                          sq m                                   Result G1 Shaunna

l or high



                                                                 >=90 G2 Mildly 

decreased



                                                                 60-89  G3a Mild

ly to



                                                                 moderately 45-5

9 G3b



                                                                 Moderately to s

everely



                                                                 30-44 G4 Severl

y decreased



                                                                 15-29 G5 Kidney

 failure



                                                                 <15Reported eGF

R is based



                                                                 on the CKD-EPI 





                                                                 equation that d

oes not use



                                                                 a race



                                                                 coefficientEsti

mated GFR



                                                                 is not as accur

ate as



                                                                 Creatinine Tabitha marie in



                                                                 predicting glom

erular



                                                                 filtration rate

. Estimated



                                                                 GFR is not appl

icable for



                                                                 dialysis patien

ts



 ID - MMSpecimen moderately [id number] 10:06:29





             Test Item    Value        Reference Range Interpretation Comments

 

             ETHANOL (BEAKER) (test code = 400) < mg/dL      <=10               

       



 ID - MM(CELLAVISION MANUAL DIFF)2023 10:04:25





             Test Item    Value        Reference Range Interpretation Comments

 

             NEUTROPHILS - REL 76 %                                   



             (CELLAVISION)(BEAKER) (test code                                   

     



             = 2816)                                             

 

             LYMPHOCYTES - REL 4 %                                    



             (CELLAVISION)(BEAKER) (test code                                   

     



             = 2817)                                             

 

             MONOCYTES - REL 5 %                                    



             (CELLAVISION)(BEAKER) (test code                                   

     



             = 2818)                                             

 

             METAMYELOCYTES - REL 6 %          0-0          H            



             (CELLAVISION)(BEAKER) (test code                                   

     



             = 2821)                                             

 

             BANDS - REL (CELLAVISION)(BEAKER) 9 %          0-10                

      



             (test code = 2826)                                        

 

             NEUTROPHILS - ABS 3.65 K/ul    1.78-5.38                 



             (CELLAVISION)(BEAKER) (test code                                   

     



             = 2830)                                             

 

             LYMPHOCYTES - ABS 0.19 K/ul    1.32-3.57    L            



             (CELLAVISION)(BEAKER) (test code                                   

     



             = 2831)                                             

 

             MONOCYTES - ABS 0.24 K/uL    0.30-0.82    L            



             (CELLAVISION)(BEAKER) (test code                                   

     



             = 2832)                                             

 

             METAMYELOCYTES - ABS 0.29 K/uL    0.00-0.00    H            



             (CELLAVISION)(BEAKER) (test code                                   

     



             = 2836)                                             

 

             BANDS - ABS (CELLAVISION)(BEAKER) 0.43 K/uL    0.00-0.80           

      



             (test code = 2840)                                        

 

             TOTAL COUNTED (BEAKER) (test code 100                              

      



             = 1351)                                             

 

             MANUAL NRBC  CELLS 1 /100 WBC   0-0          H            



             (BEAKER) (test code = 1353)                                        

 

             WBC MORPHOLOGY (BEAKER) (test Normal                               

  



             code = 487)                                         

 

             GIANT PLATELETS (BEAKER) (test Present                             

   



             code = 313)                                         

 

             POLYCHROMATOPHILLIC RBCS(BEAKER) 2+ moderate                       

     



             (test code = 478)                                        

 

             ANISOCYTOSIS (BEAKER) (test code 2+ moderate                       

     



             = 961)                                              

 

             MACROCYTES (BEAKER) (test code = 2+ moderate                       

     



             964)                                                

 

             POIKILOCYTES (BEAKER) (test code 3+ many                           

     



             = 966)                                              

 

             ELLIPTOCYTES (BEAKER) (test code 1+ few                            

     



             = 962)                                              

 

             OVALOCYTES (BEAKER) (test code = 1+ few                            

     



             477)                                                

 

             BENJI CELLS (BEAKER) (test code = 2+ moderate                       

     



             474)                                                

 

             ARTIFACT (CELLAVISION)(BEAKER) Present                             

   



             (test code = 3432)                                        

 

             PLATELET CONCENTRATION Decreased                              



             (CELLAVISION)(BEAKER) (test code                                   

     



             = 3438)                                             



 ID - [id number] ID - nolvia Trejo comments: Slide comments:CBC 
W/PLT COUNT &amp; AUTO [id number] 10:04:24





             Test Item    Value        Reference Range Interpretation Comments

 

             WHITE BLOOD CELL COUNT 4.8 K/ L     3.5-10.5                  



             (BEAKER) (test code =                                        



             775)                                                

 

             RED BLOOD CELL COUNT 2.31 M/ L    4.63-6.08    L            



             (BEAKER) (test code =                                        



             761)                                                

 

             HEMOGLOBIN (BEAKER) 7.3 GM/DL    13.7-17.5    L            



             (test code = 410)                                        

 

             HEMATOCRIT (BEAKER) 23.6 %       40.1-51.0    L            



             (test code = 411)                                        

 

             MEAN CORPUSCULAR VOLUME 102 fL       79-92        H            



             (BEAKER) (test code =                                        



             753)                                                

 

             MEAN CORPUSCULAR 31.6 pg      25.7-32.2                 



             HEMOGLOBIN (BEAKER)                                        



             (test code = 751)                                        

 

             MEAN CORPUSCULAR 30.9 GM/DL   32.3-36.5    L            



             HEMOGLOBIN CONC                                        



             (BEAKER) (test code =                                        



             752)                                                

 

             RED CELL DISTRIBUTION 20.3 %       11.6-14.4    H            



             WIDTH (BEAKER) (test                                        



             code = 412)                                         

 

             PLATELET COUNT (BEAKER) 22 K/CU MM   150-450      L            



             (test code = 756)                                        

 

             MEAN PLATELET VOLUME                                        Unable 

to report due



             (BEAKER) (test code =                                        to abn

ormal Platelet



             754)                                                population



                                                                 distribution.

 

             NUCLEATED RED BLOOD 0 /100 WBC   0-0                       



             CELLS (BEAKER) (test                                        



             code = 413)                                         



IRON, TIBC, % SAT. (WITHOUT FERRITIN)2023 10:02:23





             Test Item    Value        Reference Range Interpretation Comments

 

             IRON (BEAKER) (test code = 547) 25.0 ug/dL   40.0-160.0   L        

    

 

             TOTAL IRON BINDING CAPACITY 176 ug/dL    250-450      L            



             (BEAKER) (test code = 769)                                        

 

             IRON % SATURATION (2) (BEAKER) 14 %         20-55        L         

   



             (test code = 2590)                                        



 ID - MMLACTATE DEHYDROGENASE (LDH)2023 10:02:05





             Test Item    Value        Reference Range Interpretation Comments

 

             LACTATE DEHYDROGENASE (BEAKER) (test 376 U/L      125-220      H   

         



             code = 635)                                         



 ID - [id number] 10:02:05





             Test Item    Value        Reference Range Interpretation Comments

 

             PHOSPHORUS (BEAKER) (test code = 2.0 mg/dL    2.3-4.7      L       

     



             604)                                                



 ID - MMCREATINE KINASE (CK)2023 10:02:05





             Test Item    Value        Reference Range Interpretation Comments

 

             CREATINE KINASE TOTAL (BEAKER) (test 1598 U/L            H   

         



             code = 380)                                         



 ID - [id number] 10:02:04





             Test Item    Value        Reference Range Interpretation Comments

 

             MAGNESIUM (BEAKER) (test code = 1.4 mg/dL    1.6-2.6      L        

    



             627)                                                



 ID - MMLACTIC ACID, [id number] 10:00:29





             Test Item    Value        Reference Range Interpretation Comments

 

             LACTATE BLOOD VENOUS (2) (BEAKER) 5.35 mmol/L  0.50-2.00    HH     

      



             (test code = 2872)                                        



 ID - MMSpecimen slightly [id number] 09:53:29





             Test Item    Value        Reference Range Interpretation Comments

 

             FERRITIN (BEAKER) (test code = 63.50 ng/mL  5..00            

   



             361)                                                



 ID - MMSARS-COV2/RT-PCR ([facility])2023 09:49:37





             Test Item    Value        Reference Range Interpretation Comments

 

             SARS-COV2/RT-PCR Negative     Negative                  The SARS-Co

V-2 target



             (test code =                                        nucleic acids a

re not



             1190828)                                            detected in thi

s specimen.



                                                                 Negative result

s do not



                                                                 preclude SARS-C

oV-2



                                                                 infection and s

hould not be



                                                                 used as the sol

e basis for



                                                                 patient managem

ent



                                                                 decisions. Nega

tive results



                                                                 must be combine

d with



                                                                 clinical observ

ations,



                                                                 patient history

, and



                                                                 epidemiological

 information.



                                                                 A false negativ

e result may



                                                                 occur if a spec

imen is



                                                                 improperly josias

ected,



                                                                 transported or 

handled. This



                                                                 SARS CoV-2 test

 is a rapid,



                                                                 real-time RT-PC

R test



                                                                 intended for th

e qualitative



                                                                 detection of nu

cleic acid



                                                                 from SARS-CoV-2

 in a



                                                                 nasopharyngeal 

swab specimen



                                                                 collected from 

individuals



                                                                 suspected of CO

VID-19 by



                                                                 their healthcar

e provider.



This test has been authorized by FDA under an EUA for use by authorized 
laboratories. This test is only authorized for the duration of the declaration 
that circumstances exist justifying the authorization of emergency use of in 
vitro diagnostic tests for detection and/or diagnosis of COVID-19 under Section 
564(b)(1) of the Federal Food, Drug and Cosmetic Act, 21 U.S.C. 360bbb-3(b)(1), 
unless the authorization is terminated or revoked sooner. Fact Sheet for 
Healthcare Providers: https://www.[employer]/Documents/Xpert%20Xpress%20SARS%20CoV-2/Fact%20Sheets/3023802%51DVIZ-UEY-0%20

HEALTHCARE%20PROVIDERS%20FACT%20SHEET.pdf Fact Sheet for Healthcare Patients: 
https://www.[employer]/Documents/Xpert%20Xp
ress%20SARS%20CoV-2/Fact%20Sheets/3023801%65PLRX-DBA-0%20PATIENT%20FACT%20SHEET

.[id number] 09:33:46





             Test Item    Value        Reference Range Interpretation Comments

 

             FIBRINOGEN LEVEL (BEAKER) (test 146 mg/dl    225-434      L        

    



             code = 658)                                         



[id number] 09:29:18





             Test Item    Value        Reference Range Interpretation Comments

 

             PROTIME (BEAKER) (test code = 33.8 seconds 11.9-14.2    H          

  



             759)                                                

 

             INR (BEAKER) (test code = 370) 3.46         <=5.90                 

   

 

             PARTIAL THROMBOPLASTIN TIME 50.6 seconds 22.5-36.0    H            



             (BEAKER) (test code = 760)                                        



RECOMMENDED COUMADIN/WARFARIN INR THERAPY RANGESSTANDARD DOSE: 2.0 - 3.0 
Includes: PROPHYLAXIS for venous thrombosis, systemic embolization; TREATMENT 
for venous thrombosis and/or pulmonary embolus.HIGH RISK: Target INR is 2.5-3.5 
for patients with mechanical heart valves.RETICULOCYTE [id number] 09:25:55





             Test Item    Value        Reference Range Interpretation Comments

 

             RETICULOCYTE COUNT PCT (BEAKER) (test 3.5 %        0.5-1.8      H  

          



             code = 575)                                         



 ID - [id number] ID - [id number]-GLUCOSE [id number] 08:11:30





             Test Item    Value        Reference Range Interpretation Comments

 

             POC-GLUCOSE METER 150 mg/dL           H            : TESTED A

[facility] [address]



             (BEAKER) (test code =                                        [place],



             [address])                                               [address]:



                                                                 /Techni

melanie ID



                                                                 = [id number] for PHILLIP JACOBO



BLOOD CULTURE [id number] 14:27:19





             Test Item    Value        Reference Range Interpretation Comments

 

             Blood Culture-Aerobic Culture positive. No growth    AA           P

revious



             (test code = 17928-3) See Blood Culture                           p

reliminary



                          Workup for                             verified result



                          additional                             was Culture In



                          information.                           Progress on



                                                                 3/19/2023 at 02

01



                                                                 CDTPrevious



                                                                 preliminary



                                                                 verified result



                                                                 was No growth a

t



                                                                 24 hours on



                                                                 3/19/2023 at 23

01



                                                                 CDT

 

             Blood        No organisms No growth                 Previous



             Culture-Anaerobic isolated                               preliminar

y



             (test code = 04886-3)                                        verifi

ed result



                                                                 was Culture In



                                                                 Progress on



                                                                 3/19/2023 at 02

01



                                                                 CDTPrevious



                                                                 preliminary



                                                                 verified result



                                                                 was No growth a

t



                                                                 24 hours on



                                                                 3/20/2023 at 01

54



                                                                 CDT

 

             Lab Interpretation Abnormal                               



             (test code = 17110-8)                                        



[facility]





             Test Item    Value        Reference Range Interpretation Comments

 

             MAGNESIUM (test code = 6604390416) 1.7 mg/dL    1.7-2.4            

       

 

             Lab Interpretation (test code = Normal                             

    



             68179-0)                                            



[facility] (NA, K, CL, CO2, 
GLUCOSE, BUN, CREATININE, CA)2023 10:47:23





             Test Item    Value        Reference Range Interpretation Comments

 

             NA (test code = 134 mmol/L   135-145      L            



             [id number])                                         

 

             K (test code = 3.4 mmol/L   3.5-5.0      L            



             4779944701)                                         

 

             CL (test code = 107 mmol/L                       



             7883294306)                                         

 

             CO2 TOTAL (test code = 28 mmol/L    23-31                     



             7085051748)                                         

 

             AGAP (test code =              2-16         L            



             5333786723)                                         

 

             BUN (test code = 5 mg/dL      7-23         L            



             4403653270)                                         

 

             GLUCOSE (test code = 82 mg/dL                         



             6112574858)                                         

 

             CREATININE (test code = 0.49 mg/dL   0.60-1.25    L            



             1177282095)                                         

 

             CALCIUM (test code = 7.0 mg/dL    8.6-10.6     L            



             8433410860)                                         

 

             eGFR (test code = 176.7        mL/min/1.73m2              



             9557652276)                                         

 

             RENETTA (test code = RENETTA) Association of                           



                          Glomerular Filtration                           



                          Rate (GFR) and Staging                           



                          of Kidney Disease*                           



                          +---------------------                           



                          --+-------------------                           



                          --+-------------------                           



                          ------+| GFR                           



                          (mL/min/1.73 m2) ?|                           



                          With Kidney Damage ?|                           



                          ?Without Kidney                           



                          Damage+---------------                           



                          --------+-------------                           



                          --------+-------------                           



                          ------------+| ?>90 ?                           



                          ? ? ? ? ? ? ? ?|                           



                          ?Stage one ? ? ? ? ?|                           



                          ? Normal ? ? ? ? ? ? ?                           



                          ?+--------------------                           



                          ---+------------------                           



                          ---+------------------                           



                          -------+| ?60-89 ? ? ?                           



                          ? ? ? ? ?| ?Stage two                           



                          ? ? ? ? ?| ? Decreased                           



                          GFR ? ? ? ?                            



                          +---------------------                           



                          --+-------------------                           



                          --+-------------------                           



                          ------+| ?30-59 ? ? ?                           



                          ? ? ? ? ?| ?Stage                           



                          three ? ? ? ?| ? Stage                           



                          three ? ? ? ? ?                           



                          +---------------------                           



                          --+-------------------                           



                          --+-------------------                           



                          ------+| ?15-29 ? ? ?                           



                          ? ? ? ? ?| ?Stage four                           



                          ? ? ? ? | ? Stage four                           



                          ? ? ? ? ?                              



                          ?+--------------------                           



                          ---+------------------                           



                          ---+------------------                           



                          -------+| ?<15 (or                           



                          dialysis) ? ?| ?Stage                           



                          five ? ? ? ? | ? Stage                           



                          five ? ? ? ? ?                           



                          ?+--------------------                           



                          ---+------------------                           



                          ---+------------------                           



                          -------+ *Each stage                           



                          assumes the associated                           



                          GFR level has been in                           



                          effect for at least                           



                          three months. ?Stages                           



                          1 to 5, with or                           



                          without kidney                           



                          disease, indicate                           



                          chronic kidney                           



                          disease. Notes:                           



                          Determination of                           



                          stages one and two                           



                          (with eGFR                             



                          >59mL/min/1.73 m2)                           



                          requires estimation of                           



                          kidney damage for at                           



                          least three months as                           



                          defined by structural                           



                          or functional                           



                          abnormalities of the                           



                          kidney, manifested by                           



                          either:Pathological                           



                          abnormalities or                           



                          Markers of kidney                           



                          damage (including                           



                          abnormalities in the                           



                          composition of the                           



                          blood or urine or                           



                          abnormalities in                           



                          imaging tests).                           

 

             Lab Interpretation Abnormal                               



             (test code = 22767-0)                                        



[facility]:32:05





             Test Item    Value        Reference Range Interpretation Comments

 

             Fibrinogen (test code = 5613798178) 121 mg/dL    167-453      L    

        

 

             Lab Interpretation (test code = Abnormal                           

    



             90597-1)                                            



[facility] / [id number] 10:31:49





             Test Item    Value        Reference Range Interpretation Comments

 

             PROTIME PATIENT (test 26.0         See_Comment  H             [Auto

mated message]



             code = 5964-2)                                        The system [employer]



                                                                 generated this 

result



                                                                 transmitted ref

erence



                                                                 range: 10.1 - 1

2.6



                                                                 Seconds. The



                                                                 reference range

 was



                                                                 not used to int

erpret



                                                                 this result as



                                                                 normal/abnormal

.

 

             INR (test code = 6301-6) 2.3                                    Nor

mal INR <1.1;



                                                                 Warfarin Therap

eutic



                                                                 range 2.0 to 3.

0 or



                                                                 2.5 to 3.5, dep

ending



                                                                 upon the indica

tions.

 

             Lab Interpretation (test Abnormal                               



             code = 39872-5)                                        



[facility] acute hospital WITHOUT [id number] 10:26:10





             Test Item    Value        Reference Range Interpretation Comments

 

             WBC (test code = 2.98         See_Comment  L             [Automated

 message]



             0790-2)                                             The system [employer]



                                                                 generated this 

result



                                                                 transmitted ref

erence



                                                                 range: 4.20 - 1

0.70



                                                                 10*3/?L. The



                                                                 reference range

 was



                                                                 not used to int

erpret



                                                                 this result as



                                                                 normal/abnormal

.

 

             RBC (test code = 789-8) 3.09         See_Comment  L             [Au

tomated message]



                                                                 The system [employer]



                                                                 generated this 

result



                                                                 transmitted ref

erence



                                                                 range: 4.26 - 5

.52



                                                                 10*6/?L. The



                                                                 reference range

 was



                                                                 not used to int

erpret



                                                                 this result as



                                                                 normal/abnormal

.

 

             HGB (test code = 718-7) 8.1 g/dL     12.2-16.4    L            

 

             HCT (test code = 24.6 %       38.4-49.3    L            



             4544-3)                                             

 

             MCH (test code = 785-6) 26.2 pg      26.1-32.7                 

 

             MCV (test code = 787-2) 79.6 fL      81.7-95.6    L            

 

             MCHC (test code = 32.9 g/dL    31.2-35.0                 



             786-4)                                              

 

             PLT (test code = 777-3) 21           See_Comment  LL            [Au

tomated message]



                                                                 The system [employer]



                                                                 generated this 

result



                                                                 transmitted ref

erence



                                                                 range: 150 - 32

8



                                                                 10*3/?L. The



                                                                 reference range

 was



                                                                 not used to int

erpret



                                                                 this result as



                                                                 normal/abnormal

.

 

             MPV (test code =                                        Not Measure

d



             48178-3)                                            

 

             RDW-CV (test code = 18.6 %       12.1-15.4    H            



             788-0)                                              

 

             RDW-SD (test code = 54.0 fL      38.5-51.6    H            



             16201-7)                                            

 

             NRBC x10^3 (test code =              See_Comment                [Au

tomated message]



             4829149427)                                         The system [facility]



                                                                 generated this 

result



                                                                 transmitted ref

erence



                                                                 range: 10*3/?L.

 The



                                                                 reference range

 was



                                                                 not used to int

erpret



                                                                 this result as



                                                                 normal/abnormal

.

 

             NRBC/100 WBC (test code 0.0          See_Comment                [Au

tomated message]



             = [id number])                                        The system [facility]



                                                                 generated this 

result



                                                                 transmitted ref

erence



                                                                 range: 0.0 - 10

.0



                                                                 /100 WBCs. The



                                                                 reference range

 was



                                                                 not used to int

erpret



                                                                 this result as



                                                                 normal/abnormal

.

 

             IPF % (test code = 6.3 %        1.2-10.7                  Platelet 

count



             [id number])                                         measured by



                                                                 fluorescence me

thod.

 

             Lab Interpretation Abnormal                               



             (test code = 94912-9)                                        



[id number]08:31





             Test Item    Value        Reference Range Interpretation Comments

 

             Blood Culture-Aerobic Culture positive. No growth    AA           P

revious



             (test code = 17928-3) See Blood Culture                           p

reliminary



                          Workup for                             verified result



                          additional                             was Culture In



                          information.                           Progress on



                                                                 3/19/2023 at 02

01



                                                                 CDT

 

             Blood        No organisms No growth                 Previous



             Culture-Anaerobic isolated                               preliminar

y



             (test code = 04587-5)                                        verifi

ed result



                                                                 was Culture In



                                                                 Progress on



                                                                 3/19/2023 at 18

25



                                                                 CDT

 

             Lab Interpretation Abnormal                               



             (test code = 53459-2)                                        



[facility] [id number] 11:56:04





             Test Item    Value        Reference Range Interpretation Comments

 

             RETIC Count Automated 1.68 %       0.59-2.24                 



             (test code = 4193136691)                                        

 

             RETIC Absolute Count 0.0509       See_Comment                [Autom

ated message]



             (test code = 3028455468)                                        The

 system which



                                                                 generated this 

result



                                                                 transmitted ref

erence



                                                                 range: 0.0260 -



                                                                 0.1170 10*6/?L.

 The



                                                                 reference range

 was



                                                                 not used to int

erpret



                                                                 this result as



                                                                 normal/abnormal

.

 

             IRF % (test code = 10.50 %      2.00-19.10                



             6704596812)                                         

 

             RETIC-HE (test code = 26.8 pg      27.3-36.4    L            



             6205819524)                                         

 

             Lab Interpretation (test Abnormal                               



             code = 70792-4)                                        



[facility]49:31





             Test Item    Value        Reference Range Interpretation Comments

 

             Fibrinogen (test code = 5986713068) 127 mg/dL    167-453      L    

        

 

             Lab Interpretation (test code = Abnormal                           

    



             66560-3)                                            



[facility] [id number] 11:49:06





             Test Item    Value        Reference Range Interpretation Comments

 

             PROTIME PATIENT (test 25.4         See_Comment  H             [Auto

mated message]



             code = 5964-2)                                        The system [employer]



                                                                 generated this 

result



                                                                 transmitted ref

erence



                                                                 range: 10.1 - 1

2.6



                                                                 Seconds. The



                                                                 reference range

 was



                                                                 not used to int

erpret



                                                                 this result as



                                                                 normal/abnormal

.

 

             INR (test code = 6301-6) 2.3                                    Nor

mal INR <1.1;



                                                                 Warfarin Therap

eutic



                                                                 range 2.0 to 3.

0 or



                                                                 2.5 to 3.5, dep

ending



                                                                 upon the indica

tions.

 

             Lab Interpretation (test Abnormal                               



             code = 22078-3)                                        



[facility] (NA, K, CL, CO2, 
GLUCOSE, BUN, CREATININE, CA)2023 09:35:08





             Test Item    Value        Reference Range Interpretation Comments

 

             NA (test code = 133 mmol/L   135-145      L            



             6113661752)                                         

 

             K (test code = 3.7 mmol/L   3.5-5.0                   



             5073697062)                                         

 

             CL (test code = 106 mmol/L                       



             2667202408)                                         

 

             CO2 TOTAL (test code = 28 mmol/L    23-31                     



             3693018867)                                         

 

             AGAP (test code =              2-16         L            



             0165889916)                                         

 

             BUN (test code = 7 mg/dL      7-23                      



             8690756347)                                         

 

             GLUCOSE (test code = 100 mg/dL                        



             2901219521)                                         

 

             CREATININE (test code = 0.42 mg/dL   0.60-1.25    L            



             6453892332)                                         

 

             CALCIUM (test code = 6.5 mg/dL    8.6-10.6     L            



             2018417541)                                         

 

             eGFR (test code = 211.1        mL/min/1.73m2              



             3935635187)                                         

 

             RENETTA (test code = RENETTA) Association of                           



                          Glomerular Filtration                           



                          Rate (GFR) and Staging                           



                          of Kidney Disease*                           



                          +---------------------                           



                          --+-------------------                           



                          --+-------------------                           



                          ------+| GFR                           



                          (mL/min/1.73 m2) ?|                           



                          With Kidney Damage ?|                           



                          ?Without Kidney                           



                          Damage+---------------                           



                          --------+-------------                           



                          --------+-------------                           



                          ------------+| ?>90 ?                           



                          ? ? ? ? ? ? ? ?|                           



                          ?Stage one ? ? ? ? ?|                           



                          ? Normal ? ? ? ? ? ? ?                           



                          ?+--------------------                           



                          ---+------------------                           



                          ---+------------------                           



                          -------+| ?60-89 ? ? ?                           



                          ? ? ? ? ?| ?Stage two                           



                          ? ? ? ? ?| ? Decreased                           



                          GFR ? ? ? ?                            



                          +---------------------                           



                          --+-------------------                           



                          --+-------------------                           



                          ------+| ?30-59 ? ? ?                           



                          ? ? ? ? ?| ?Stage                           



                          three ? ? ? ?| ? Stage                           



                          three ? ? ? ? ?                           



                          +---------------------                           



                          --+-------------------                           



                          --+-------------------                           



                          ------+| ?15-29 ? ? ?                           



                          ? ? ? ? ?| ?Stage four                           



                          ? ? ? ? | ? Stage four                           



                          ? ? ? ? ?                              



                          ?+--------------------                           



                          ---+------------------                           



                          ---+------------------                           



                          -------+| ?<15 (or                           



                          dialysis) ? ?| ?Stage                           



                          five ? ? ? ? | ? Stage                           



                          five ? ? ? ? ?                           



                          ?+--------------------                           



                          ---+------------------                           



                          ---+------------------                           



                          -------+ *Each stage                           



                          assumes the associated                           



                          GFR level has been in                           



                          effect for at least                           



                          three months. ?Stages                           



                          1 to 5, with or                           



                          without kidney                           



                          disease, indicate                           



                          chronic kidney                           



                          disease. Notes:                           



                          Determination of                           



                          stages one and two                           



                          (with eGFR                             



                          >59mL/min/1.73 m2)                           



                          requires estimation of                           



                          kidney damage for at                           



                          least three months as                           



                          defined by structural                           



                          or functional                           



                          abnormalities of the                           



                          kidney, manifested by                           



                          either:Pathological                           



                          abnormalities or                           



                          Markers of kidney                           



                          damage (including                           



                          abnormalities in the                           



                          composition of the                           



                          blood or urine or                           



                          abnormalities in                           



                          imaging tests).                           

 

             Lab Interpretation Abnormal                               



             (test code = 96130-7)                                        



[facility]32[facility]37





             Test Item    Value        Reference Range Interpretation Comments

 

             MAGNESIUM (test code = 1365025332) 1.5 mg/dL    1.7-2.4      L     

       

 

             Lab Interpretation (test code = Abnormal                           

    



             83442-6)                                            



[facility] [id number] 09:27:43





             Test Item    Value        Reference Range Interpretation Comments

 

             WBC (test code = 2.93         See_Comment  L             [Automated



             5577-2)                                             message] The sy

stem



                                                                 which generated



                                                                 this result



                                                                 transmitted



                                                                 reference range

:



                                                                 4.20 - 10.70



                                                                 10*3/?L. The



                                                                 reference range

 was



                                                                 not used to



                                                                 interpret this



                                                                 result as



                                                                 normal/abnormal

.

 

             RBC (test code = 3.06         See_Comment  L             [Automated



             107-8)                                              message] The sy

stem



                                                                 which generated



                                                                 this result



                                                                 transmitted



                                                                 reference range

:



                                                                 4.26 - 5.52



                                                                 10*6/?L. The



                                                                 reference range

 was



                                                                 not used to



                                                                 interpret this



                                                                 result as



                                                                 normal/abnormal

.

 

             HGB (test code = 7.9 g/dL     12.2-16.4    L            



             718-7)                                              

 

             HCT (test code = 23.9 %       38.4-49.3    L            



             4544-3)                                             

 

             MCV (test code = 78.1 fL      81.7-95.6    L            



             787-2)                                              

 

             MCH (test code = 25.8 pg      26.1-32.7    L            



             785-6)                                              

 

             MCHC (test code = 33.1 g/dL    31.2-35.0                 



             786-4)                                              

 

             RDW-SD (test code = 51.2 fL      38.5-51.6                 



             94370-4)                                            

 

             RDW-CV (test code = 18.3 %       12.1-15.4    H            



             788-0)                                              

 

             PLT (test code = 18           See_Comment  LL            [Automated



             777-3)                                              message] The sy

stem



                                                                 which generated



                                                                 this result



                                                                 transmitted



                                                                 reference range

:



                                                                 150 - 328 10*3/

?L.



                                                                 The reference r

moose



                                                                 was not used to



                                                                 interpret this



                                                                 result as



                                                                 normal/abnormal

.

 

             MPV (test code =                                        Not Measure

d



             13651-4)                                            

 

             IPF % (test code = 8.0 %        1.2-10.7                  Platelet 

count



             [id number])                                         measured by



                                                                 fluorescence



                                                                 method.

 

             NRBC/100 WBC (test 0.0          See_Comment                [Automat

ed



             code = 2866560138)                                        message] 

The system



                                                                 which generated



                                                                 this result



                                                                 transmitted



                                                                 reference range

:



                                                                 0.0 - 10.0 /100



                                                                 WBCs. The refer

ence



                                                                 range was not u

sed



                                                                 to interpret th

is



                                                                 result as



                                                                 normal/abnormal

.

 

             NRBC x10^3 (test code              See_Comment                [Auto

mated



             = [id number])                                        message] The s

ystem



                                                                 which generated



                                                                 this result



                                                                 transmitted



                                                                 reference range

:



                                                                 10*3/?L. The



                                                                 reference range

 was



                                                                 not used to



                                                                 interpret this



                                                                 result as



                                                                 normal/abnormal

.

 

             GRAN MAT (NEUT) % 54.9 %                                 



             (test code = 770-8)                                        

 

             IMM GRAN % (test code 0.70 %                                 



             = [id number])                                        

 

             LYMPH % (test code = 22.9 %                                 



             736-9)                                              

 

             MONO % (test code = 16.0 %                                 



             5905-5)                                             

 

             EOS % (test code = 4.8 %                                  



             713-8)                                              

 

             BASO % (test code = 0.7 %                                  



             706-2)                                              

 

             GRAN MAT x10^3(ANC) 1.61 10*3/uL 1.99-6.95    L            



             (test code =                                        



             9000244801)                                         

 

             IMM GRAN x10^3 (test              0.00-0.06                 



             code = 5121946480)                                        

 

             LYMPH x10^3 (test code 0.67 10*3/uL 1.09-3.23    L            



             = 731-0)                                            

 

             MONO x10^3 (test code 0.47 10*3/uL 0.36-1.02                 



             = 742-7)                                            

 

             EOS x10^3 (test code = 0.14 10*3/uL 0.06-0.53                 



             711-2)                                              

 

             BASO x10^3 (test code              0.01-0.09                 



             = 704-7)                                            

 

             BENJI CELLS (test code 2+           See_Comment  A             [Auto

mated



             = 6290-9)                                           message] The sy

stem



                                                                 which generated



                                                                 this result



                                                                 transmitted



                                                                 reference range

:



                                                                 (none). The



                                                                 reference range

 was



                                                                 not used to



                                                                 interpret this



                                                                 result as



                                                                 normal/abnormal

.

 

             Lab Interpretation Abnormal                               



             (test code = 13003-2)                                        



[facility]





             Test Item    Value        Reference Range Interpretation Comments

 

             Fibrinogen (test code = 5166645897) 122 mg/dL    167-453      L    

        

 

             Lab Interpretation (test code = Abnormal                           

    



             89728-3)                                            



[facility] Cry[facility] (in units): 1 
Units~Indication: 1) Fibrinogen &lt; 100 mg/dL with bleeding or potential for 
bleeding associated with invasive mihixdscs9430-75-97 18:41:31





             Test Item    Value        Reference Range Interpretation Comments

 

             Unit Blood Type (test O Neg                                  



             code = 4410)                                        

 

             ISBT Blood Type Code 9500                                   



             (test code = 215142)                                        

 

             Unit Number (test [id number]                           



             code = 4411)                                        

 

             Blood Expiration Date                            



             & Time (test code =                                        



             183841)                                             

 

             Status Information Issued                                 



             (test code = 4412)                                        

 

             Product      Cryoprecipitate                           



             Identification (test                                        



             code = 4413)                                        

 

             Product Code (test N8940E34                               Performed

 at [facility]



             code = 4414)                                        Laboratory



                                                                 Services - GAL



                                                                 Blood [address] [place] [address]



                                                                 [id number] Free:



                                                                 [phone]

A



                                                                 No. [id number]



[facility] PATHOGENS DNA 
GPCNA-ULVNKSZ7560-95-20 05:00:05





             Test Item    Value        Reference Range Interpretation Comments

 

             Acinetobacter species Positive     Negative, See A            



             (test code = 10388-2)              Comment/Narrative              

 

             RENETTA (test code = RENETTA) See blood culture                           



                          result for additional                           



                          information. ?Testing                           



                          included eight                           



                          identification and six                           



                          resistance marker                           



                          targets.                               

 

             Lab Interpretation Abnormal                               



             (test code = 08384-4)                                        



[facility] Acid Whole Aopda2854-11-36 21:53:15





             Test Item    Value        Reference Range Interpretation Comments

 

             LACTIC ACID (test code = 3.62 mmol/L  0.50-2.20    H            



             6158120275)                                         

 

             Lab Interpretation (test code = Abnormal                           

    



             93088-2)                                            



[facility] Packed RBC (in units), 2 Units
2023 19:27:30





             Test Item    Value        Reference Range Interpretation Comments

 

             Cross Match Result Compatible                             



             (test code = 4409)                                        

 

             ISBT Blood Type Code 5100                                   



             (test code = 677997)                                        

 

             Unit Blood Type (test O Pos                                  



             code = 4410)                                        

 

             Unit Number (test [id number]                           



             code = 4411)                                        

 

             Blood Expiration Date 102615450780                           



             & Time (test code =                                        



             781344)                                             

 

             Status Information Issued                                 



             (test code = 4412)                                        

 

             Product      Red Blood Cells                           



             Identification (test                                        



             code = 4413)                                        

 

             Product Code (test S7683L86                               Performed

 at [facility]



             code = 4414)                                        Laboratory



                                                                 Services - GAL



                                                                 Blood [address] [place] [address]



                                                                 [id number] Free:



                                                                 [phone]

A



                                                                 No. [id number]



[facility] Acid Whole Udrcd4007-84-74 17:17:41





             Test Item    Value        Reference Range Interpretation Comments

 

             LACTIC ACID (test code = 4.31 mmol/L  0.50-2.20    H            



             5199928385)                                         

 

             Lab Interpretation (test code = Abnormal                           

    



             61726-1)                                            



[facility] Hospital[facility]th[facility] Time / GEV5542-02-19 12:01:29





             Test Item    Value        Reference Range Interpretation Comments

 

             PROTIME PATIENT (test 25.2         See_Comment  H             [Auto

mated message]



             code = 5964-2)                                        The system [employer]



                                                                 generated this 

result



                                                                 transmitted ref

erence



                                                                 range: 10.1 - 1

2.6



                                                                 Seconds. The



                                                                 reference range

 was



                                                                 not used to int

erpret



                                                                 this result as



                                                                 normal/abnormal

.

 

             INR (test code = 6301-6) 2.3                                    Nor

mal INR <1.1;



                                                                 Warfarin Therap

eutic



                                                                 range 2.0 to 3.

0 or



                                                                 2.5 to 3.5, dep

ending



                                                                 upon the indica

tions.

 

             Lab Interpretation (test Abnormal                               



             code = 51751-5)                                        



[facility] Hospital[facility] Whole Wuwjp4220-83-23 11:52:59





             Test Item    Value        Reference Range Interpretation Comments

 

             LACTIC ACID (test code = 4.93 mmol/L  0.50-2.20    H            



             9288473518)                                         

 

             Lab Interpretation (test code = Abnormal                           

    



             46370-1)                                            



[facility] Cryoprecipitate (in units): 1 
Units~Indication: 1) Fibrinogen &lt; 100 mg/dL with bleeding or potential for 
bleeding associated with invasive fousmkaxq1346-83-93 10:16:18





             Test Item    Value        Reference Range Interpretation Comments

 

             Unit Blood Type (test O                                      



             code = 4410)                                        

 

             ISBT Blood Type Code D000                                   



             (test code = 211764)                                        

 

             Unit Number (test [id number]                           



             code = 4411)                                        

 

             Blood Expiration Date                            



             & Time (test code =                                        



             947387)                                             

 

             Status Information Issued                                 



             (test code = 4412)                                        

 

             Product      Cryoprecipitate                           



             Identification (test                                        



             code = 4413)                                        

 

             Product Code (test F8081A80                               Performed

 at [facility]



             code = 4414)                                        Laboratory



                                                                 Services - GAL



                                                                 Blood [address]

s



                                                                 [address]Dmhq Free:



                                                                 [phone]

A



                                                                 No. [id number]



[facility] Packed RBC (in units), 2 Units
2023 10:16:18





             Test Item    Value        Reference Range Interpretation Comments

 

             Cross Match Result Compatible                             



             (test code = 4409)                                        

 

             ISBT Blood Type Code 5100                                   



             (test code = 123830)                                        

 

             Unit Blood Type (test O Pos                                  



             code = 4410)                                        

 

             Unit Number (test [id number]                           



             code = 4411)                                        

 

             Blood Expiration Date                            



             & Time (test code =                                        



             654182)                                             

 

             Status Information Issued                                 



             (test code = 4412)                                        

 

             Product      Red Blood Cells                           



             Identification (test                                        



             code = 4413)                                        

 

             Product Code (test X5248B41                               Performed

 at [facility]



             code = 4414)                                        Laboratory



                                                                 Services - GAL



                                                                 Blood [address]

s



                                                                 [id number] Free:



                                                                 [phone]

A



                                                                 No. [id number]



[facility] / HEMOGRAM - 30 minutes after 
transfusion of each JAX6630-07-35 09:00:47





             Test Item    Value        Reference Range Interpretation Comments

 

             WBC (test code = 6.20         See_Comment                [Automated

 message]



             6690-2)                                             The system [employer]



                                                                 generated this 

result



                                                                 transmitted ref

erence



                                                                 range: 4.20 - 1

0.70



                                                                 10*3/?L. The



                                                                 reference range

 was



                                                                 not used to int

erpret



                                                                 this result as



                                                                 normal/abnormal

.

 

             RBC (test code = 789-8) 2.41         See_Comment  L             [Au

tomated message]



                                                                 The system [employer]





                                                                 generated this 

result



                                                                 transmitted ref

erence



                                                                 range: 4.26 - 5

.52



                                                                 10*6/?L. The



                                                                 reference range

 was



                                                                 not used to int

erpret



                                                                 this result as



                                                                 normal/abnormal

.

 

             HGB (test code = 718-7) 6.0 g/dL     12.2-16.4    L            

 

             HCT (test code = 18.6 %       38.4-49.3    L            



             4544-3)                                             

 

             MCH (test code = 785-6) 24.9 pg      26.1-32.7    L            

 

             MCV (test code = 787-2) 77.2 fL      81.7-95.6    L            

 

             MCHC (test code = 32.3 g/dL    31.2-35.0                 



             786-4)                                              

 

             PLT (test code = 777-3) 44           See_Comment  LL            [Au

tomated message]



                                                                 The system [employer]



                                                                 generated this 

result



                                                                 transmitted ref

erence



                                                                 range: 150 - 32

8



                                                                 10*3/?L. The



                                                                 reference range

 was



                                                                 not used to int

erpret



                                                                 this result as



                                                                 normal/abnormal

.

 

             MPV (test code =                                        Not Measure

d



             43007-6)                                            

 

             RDW-CV (test code = 19.5 %       12.1-15.4    H            



             788-0)                                              

 

             RDW-SD (test code = 55.1 fL      38.5-51.6    H            



             99244-1)                                            

 

             NRBC x10^3 (test code =              See_Comment                [Au

tomated message]



             6571691405)                                         The system [employer]



                                                                 generated this 

result



                                                                 transmitted ref

erence



                                                                 range: 10*3/?L.

 The



                                                                 reference range

 was



                                                                 not used to int

erpret



                                                                 this result as



                                                                 normal/abnormal

.

 

             NRBC/100 WBC (test code 0.0          See_Comment                [Au

tomated message]



             = 8533706495)                                        The system [facility]



                                                                 generated this 

result



                                                                 transmitted ref

erence



                                                                 range: 0.0 - 10

.0



                                                                 /100 WBCs. The



                                                                 reference range

 was



                                                                 not used to int

erpret



                                                                 this result as



                                                                 normal/abnormal

.

 

             IPF % (test code = 5.6 %        1.2-10.7                  Platelet 

count



             [id number])                                         measured by



                                                                 fluorescence me

thod.

 

             Lab Interpretation Abnormal                               



             (test code = 06585-7)                                        



[facility] Hospital[facility] Whole Dhusp2984-09-54 08:52:22





             Test Item    Value        Reference Range Interpretation Comments

 

             LACTIC ACID (test code = 5.63 mmol/L  0.50-2.20    H            



             3104943927)                                         

 

             Lab Interpretation (test code = Abnormal                           

    



             56851-2)                                            



[facility] HospitalFR [id number] 05:19:47





             Test Item    Value        Reference Range Interpretation Comments

 

             FREE T4 (test code = 1.82         See_Comment                [Autom

ated message]



             0844191750)                                         The system [employer]



                                                                 generated this 

result



                                                                 transmitted ref

erence



                                                                 range: 0.78 - 2

.20



                                                                 ng/dL:. The ref

erence



                                                                 range was not u

sed to



                                                                 interpret this 

result



                                                                 as normal/abnor

mal.

 

             Lab Interpretation (test Normal                                 



             code = 08724-1)                                        



[facility] HospitalHCV XJPACWHB4605-08-47 04:49:47





             Test Item    Value        Reference Range Interpretation Comments

 

             HCV Ab (test code = 35410-8) Negative                              

 

 

             HCV Semi-Quantitative (test code = 0.05                            

       



             00316-1)                                            



[facility] [id number] 04:32:04





             Test Item    Value        Reference Range Interpretation Comments

 

             TSH (test code = 1.95         See_Comment                [Automated

 message]



             [id number])                                         The system [employer]



                                                                 generated this 

result



                                                                 transmitted ref

erence



                                                                 range: 0.45 - 4

.70



                                                                 mIU/L. The refe

rence



                                                                 range was not u

sed to



                                                                 interpret this 

result



                                                                 as normal/abnor

mal.

 

             Lab Interpretation (test Normal                                 



             code = 72163-3)                                        



[facility] Lactic [id number] 04:02:48





             Test Item    Value        Reference Range Interpretation Comments

 

             PH (test code = 2) 7.39         7.35-7.45                 

 

             PCO2 (test code = 27           See_Comment  L             [Automate

d



             [id number])                                         message] The sy

stem



                                                                 which generated



                                                                 this result



                                                                 transmitted



                                                                 reference range

: 35



                                                                 - 45 mmHg. The



                                                                 reference range

 was



                                                                 not used to



                                                                 interpret this



                                                                 result as



                                                                 normal/abnormal

.

 

             PO2 (test code = 120          See_Comment  H             [Automated



             [id number])                                         message] The sy

stem



                                                                 which generated



                                                                 this result



                                                                 transmitted



                                                                 reference range

: 80



                                                                 - 100 mmHg. The



                                                                 reference range

 was



                                                                 not used to



                                                                 interpret this



                                                                 result as



                                                                 normal/abnormal

.

 

             HCO3 (test code = 17           See_Comment  L             [Automate

d



             2428554523)                                         message] The sy

stem



                                                                 which generated



                                                                 this result



                                                                 transmitted



                                                                 reference range

: 22



                                                                 - 26 mEq/L. The



                                                                 reference range

 was



                                                                 not used to



                                                                 interpret this



                                                                 result as



                                                                 normal/abnormal

.

 

             BE (test code = -8.5         See_Comment  L             [Automated



             [id number])                                         message] The sy

stem



                                                                 which generated



                                                                 this result



                                                                 transmitted



                                                                 reference range

:



                                                                 -3.0 - 3.0 mEq/

L.



                                                                 The reference r

moose



                                                                 was not used to



                                                                 interpret this



                                                                 result as



                                                                 normal/abnormal

.

 

             THB (test code = 6.2 g/dL     13.5-18.0    LL           



             6152024858)                                         

 

             %O2HB (test code = 97.0 %       94.0-99.0                 



             2025734963)                                         

 

             %COHB ART (test code = 2.0 %        0.0-1.5      H            



             1525643420)                                         

 

             %METHB ART (test code = 0.2 %        0.4-1.5      L            



             8205034081)                                         

 

             VOL%O2 ART (test code = 8.8 %        15.0-23.0    L            



             5225412514)                                         

 

             NA (test code = 129 mmol/L   135-145      L            



             8667061883)                                         

 

             K+ (test code = 4.0 mmol/L   3.5-5.0                   



             6564316788)                                         

 

             AC CA IONZ (test code = 4.10 mg/dL   4.50-5.30    L            



             8577421051)                                         

 

             GLUCOSE (test code = 111 mg/dL           H            



             9326721114)                                         

 

             LACTIC ACID (test code 7.31 mmol/L  0.50-2.20    H            



             = 6356493087)                                        

 

             Lab Interpretation Abnormal                               



             (test code = 62485-7)                                        



[facility] WITH [id number] 03:46:18





             Test Item    Value        Reference Range Interpretation Comments

 

             HIV          0.09         Negative                  



             Semi-quantitative                                        



             (test code =                                        



             56133-6)                                            

 

             RENETTA (test code = Non-reactive for HIV-1                           



             RENETTA)         antigen and HIV-1/HIV-2                           



                          antibodies. ?No                           



                          laboratory evidence of                           



                          HIV infection. ?Repeat in                           



                          2-4 weeks if acute HIV                           



                          infection is suspected.                           



[facility] SERUM OR [id number] 01:46:37





             Test Item    Value        Reference Range Interpretation Comments

 

             OSMOLALITY (test code = 373          See_Comment  HH            [Au

tomated message]



             1082-2)                                             The system [employer]

h



                                                                 generated this 

result



                                                                 transmitted ref

erence



                                                                 range: 278 - 30

5



                                                                 mOsm/kg. The



                                                                 reference range

 was



                                                                 not used to int

erpret



                                                                 this result as



                                                                 normal/abnormal

.

 

             Lab Interpretation (test Abnormal                               



             code = 02151-5)                                        



[facility]:09:07





             Test Item    Value        Reference Range Interpretation Comments

 

             Fibrinogen (test code = 1156340560) 96 mg/dL     167-453      LL   

        

 

             Lab Interpretation (test code = Abnormal                           

    



             12419-3)                                            



[facility] WITH [id number] 01:05:00





             Test Item    Value        Reference Range Interpretation Comments

 

             WBC (test code = 2.86         See_Comment  L             [Automated



             4890-2)                                             message] The sy

stem



                                                                 which generated



                                                                 this result



                                                                 transmitted



                                                                 reference range

:



                                                                 4.20 - 10.70



                                                                 10*3/?L. The



                                                                 reference range

 was



                                                                 not used to



                                                                 interpret this



                                                                 result as



                                                                 normal/abnormal

.

 

             RBC (test code = 2.34         See_Comment  L             [Automated



             789-8)                                              message] The sy

stem



                                                                 which generated



                                                                 this result



                                                                 transmitted



                                                                 reference range

:



                                                                 4.26 - 5.52



                                                                 10*6/?L. The



                                                                 reference range

 was



                                                                 not used to



                                                                 interpret this



                                                                 result as



                                                                 normal/abnormal

.

 

             HGB (test code = 5.1 g/dL     12.2-16.4    L            



             718-7)                                              

 

             HCT (test code = 17.9 %       38.4-49.3    L            



             4544-3)                                             

 

             MCV (test code = 76.5 fL      81.7-95.6    L            



             787-2)                                              

 

             MCH (test code = 21.8 pg      26.1-32.7    L            



             785-6)                                              

 

             MCHC (test code = 28.5 g/dL    31.2-35.0    L            



             786-4)                                              

 

             RDW-SD (test code = 59.5 fL      38.5-51.6    H            



             31269-5)                                            

 

             RDW-CV (test code = 21.6 %       12.1-15.4    H            



             788-0)                                              

 

             PLT (test code = 55           See_Comment  L             [Automated



             777-3)                                              message] The sy

stem



                                                                 which generated



                                                                 this result



                                                                 transmitted



                                                                 reference range

:



                                                                 150 - 328 10*3/

?L.



                                                                 The reference r

moose



                                                                 was not used to



                                                                 interpret this



                                                                 result as



                                                                 normal/abnormal

.

 

             MPV (test code =                                        Not Measure

d



             42595-0)                                            

 

             IPF % (test code = 6.8 %        1.2-10.7                  Platelet 

count



             9589090065)                                         measured by



                                                                 fluorescence



                                                                 method.

 

             NRBC/100 WBC (test 0.0          See_Comment                [Automat

ed



             code = 2545241571)                                        message] 

The system



                                                                 which generated



                                                                 this result



                                                                 transmitted



                                                                 reference range

:



                                                                 0.0 - 10.0 /100



                                                                 WBCs. The refer

ence



                                                                 range was not u

sed



                                                                 to interpret th

is



                                                                 result as



                                                                 normal/abnormal

.

 

             NRBC x10^3 (test code              See_Comment                [Auto

mated



             = 1913018189)                                        message] The s

ystem



                                                                 which generated



                                                                 this result



                                                                 transmitted



                                                                 reference range

:



                                                                 10*3/?L. The



                                                                 reference range

 was



                                                                 not used to



                                                                 interpret this



                                                                 result as



                                                                 normal/abnormal

.

 

             GRAN MAT (NEUT) % 51.4 %                                 



             (test code = 770-8)                                        

 

             IMM GRAN % (test code 0.30 %                                 



             = 1897138279)                                        

 

             LYMPH % (test code = 31.5 %                                 



             736-9)                                              

 

             MONO % (test code = 13.3 %                                 



             5905-5)                                             

 

             EOS % (test code = 2.1 %                                  



             713-8)                                              

 

             BASO % (test code = 1.4 %                                  



             706-2)                                              

 

             GRAN MAT x10^3(ANC) 1.47 10*3/uL 1.99-6.95    L            



             (test code =                                        



             3565433607)                                         

 

             IMM GRAN x10^3 (test              0.00-0.06                 



             code = 7845416334)                                        

 

             LYMPH x10^3 (test code 0.90 10*3/uL 1.09-3.23    L            



             = 731-0)                                            

 

             MONO x10^3 (test code 0.38 10*3/uL 0.36-1.02                 



             = 742-7)                                            

 

             EOS x10^3 (test code = 0.06 10*3/uL 0.06-0.53                 



             711-2)                                              

 

             BASO x10^3 (test code 0.04 10*3/uL 0.01-0.09                 



             = 704-7)                                            

 

             BENJI CELLS (test code 2+           See_Comment  A             [Auto

mated



             = 8518-9)                                           message] The sy

stem



                                                                 which generated



                                                                 this result



                                                                 transmitted



                                                                 reference range

:



                                                                 (none). The



                                                                 reference range

 was



                                                                 not used to



                                                                 interpret this



                                                                 result as



                                                                 normal/abnormal

.

 

             SCHISTOCYTES (test 1+                        A            



             code = 800-3)                                        

 

             Lab Interpretation Abnormal                               



             (test code = 61997-8)                                        



[facility] 00:48:58





             Test Item    Value        Reference Range Interpretation Comments

 

             ALCOHOL (test code = 355 mg/dL                              



             7227279763)                                         

 

             RENETTA (test code = Toxic Greater than or                           



             RENETTA)         equal to 80 mg/dL. NOTE:                           



                          Whole blood values are                           



                          approximately 10% to 15%                           



                          lower than serum and                           



                          plasma.                                



[facility] HospitalPrepare Packed RBC (in units), 1 Units
2023 00:29:29





             Test Item    Value        Reference Range Interpretation Comments

 

             Cross Match Result Compatible                             



             (test code = 4409)                                        

 

             ISBT Blood Type Code 5100                                   



             (test code = 995620)                                        

 

             Unit Blood Type (test O Pos                                  



             code = 4410)                                        

 

             Unit Number (test [id number]                           



             code = 4411)                                        

 

             Blood Expiration Date                            



             & Time (test code =                                        



             455946)                                             

 

             Status Information Issued                                 



             (test code = 4412)                                        

 

             Product      Red Blood Cells                           



             Identification (test                                        



             code = 4413)                                        

 

             Product Code (test [id number]                               Performed

 at [facility]



             code = 4414)                                        Laboratory



                                                                 Services - GAL



                                                                 Blood [address] [place] [address]



                                                                 [address]Ixjg Free:



                                                                 [phone]

A



                                                                 No. [id number]



[facility] (NA, K, CL, CO2, 
GLUCOSE, BUN, CREATININE, CA)2023 00:27:54





             Test Item    Value        Reference Range Interpretation Comments

 

             NA (test code = 132 mmol/L   135-145      L            



             3774242415)                                         

 

             K (test code = 3.5 mmol/L   3.5-5.0                   



             7224428319)                                         

 

             CL (test code = 103 mmol/L                       



             1410383649)                                         

 

             CO2 TOTAL (test code = 17 mmol/L    23-31        L            



             2442166727)                                         

 

             AGAP (test code = 12           2-16                      



             6148650218)                                         

 

             BUN (test code = 7 mg/dL      7-23                      



             8798348009)                                         

 

             GLUCOSE (test code = 145 mg/dL           H            



             8514056381)                                         

 

             CREATININE (test code = 0.48 mg/dL   0.60-1.25    L            



             9853360801)                                         

 

             CALCIUM (test code = 6.9 mg/dL    8.6-10.6     L            



             9041426313)                                         

 

             eGFR (test code = 181.0        mL/min/1.73m2              



             2654383416)                                         

 

             RENETTA (test code = RENETTA) Association of                           



                          Glomerular Filtration                           



                          Rate (GFR) and Staging                           



                          of Kidney Disease*                           



                          +---------------------                           



                          --+-------------------                           



                          --+-------------------                           



                          ------+| GFR                           



                          (mL/min/1.73 m2) ?|                           



                          With Kidney Damage ?|                           



                          ?Without Kidney                           



                          Damage+---------------                           



                          --------+-------------                           



                          --------+-------------                           



                          ------------+| ?>90 ?                           



                          ? ? ? ? ? ? ? ?|                           



                          ?Stage one ? ? ? ? ?|                           



                          ? Normal ? ? ? ? ? ? ?                           



                          ?+--------------------                           



                          ---+------------------                           



                          ---+------------------                           



                          -------+| ?60-89 ? ? ?                           



                          ? ? ? ? ?| ?Stage two                           



                          ? ? ? ? ?| ? Decreased                           



                          GFR ? ? ? ?                            



                          +---------------------                           



                          --+-------------------                           



                          --+-------------------                           



                          ------+| ?30-59 ? ? ?                           



                          ? ? ? ? ?| ?Stage                           



                          three ? ? ? ?| ? Stage                           



                          three ? ? ? ? ?                           



                          +---------------------                           



                          --+-------------------                           



                          --+-------------------                           



                          ------+| ?15-29 ? ? ?                           



                          ? ? ? ? ?| ?Stage four                           



                          ? ? ? ? | ? Stage four                           



                          ? ? ? ? ?                              



                          ?+--------------------                           



                          ---+------------------                           



                          ---+------------------                           



                          -------+| ?<15 (or                           



                          dialysis) ? ?| ?Stage                           



                          five ? ? ? ? | ? Stage                           



                          five ? ? ? ? ?                           



                          ?+--------------------                           



                          ---+------------------                           



                          ---+------------------                           



                          -------+ *Each stage                           



                          assumes the associated                           



                          GFR level has been in                           



                          effect for at least                           



                          three months. ?Stages                           



                          1 to 5, with or                           



                          without kidney                           



                          disease, indicate                           



                          chronic kidney                           



                          disease. Notes:                           



                          Determination of                           



                          stages one and two                           



                          (with eGFR                             



                          >59mL/min/1.73 m2)                           



                          requires estimation of                           



                          kidney damage for at                           



                          least three months as                           



                          defined by structural                           



                          or functional                           



                          abnormalities of the                           



                          kidney, manifested by                           



                          either:Pathological                           



                          abnormalities or                           



                          Markers of kidney                           



                          damage (including                           



                          abnormalities in the                           



                          composition of the                           



                          blood or urine or                           



                          abnormalities in                           



                          imaging tests).                           

 

             Lab Interpretation Abnormal                               



             (test code = 52902-3)                                        



[facility] (ALB,T.PRO,BILI
T,BU/BC,ALT,AST,ALK PHOS)2023 00:27:54





             Test Item    Value        Reference Range Interpretation Comments

 

             TOTAL BILI (test code = 8272830225) 3.0 mg/dL    0.1-1.1      H    

        

 

             BILI UNCON (test code = 9654391147) 1.5 mg/dL    0.1-1.1      H    

        

 

             BILI CONJ (test code = 0758545958) 0.3 mg/dL    0.0-0.3            

       

 

             T PROTEIN (test code = 9897127844) 5.6 g/dL     6.3-8.2      L     

       

 

             ALBUMIN (test code = 0399318430) 2.3 g/dL     3.5-5.0      L       

     

 

             ALK PHOS (test code = 9950842720) 182 U/L             H      

      

 

             ALTv (test code = 1742-6) 27 U/L       5-50                      

 

             AST(SGOT) (test code = 8036403279) 58 U/L       13-40        H     

       

 

             Lab Interpretation (test code = Abnormal                           

    



             46556-8)                                            



[id number] 00:27:54





             Test Item    Value        Reference Range Interpretation Comments

 

             CK (test code = 8671056163) 689 U/L             H            

 

             Lab Interpretation (test code = Abnormal                           

    



             89214-9)                                            



[facility]27:54





             Test Item    Value        Reference Range Interpretation Comments

 

             MAGNESIUM (test code = 9427652620) 2.1 mg/dL    1.7-2.4            

       

 

             Lab Interpretation (test code = Normal                             

    



             01908-1)                                            



[facility]:27:54





             Test Item    Value        Reference Range Interpretation Comments

 

             PHOSPHORUS (test code = 9581187367) 5.1 mg/dL    2.5-5.0      H    

        

 

             Lab Interpretation (test code = Abnormal                           

    



             30006-6)                                            



[facility], [id number] 00:25:13





             Test Item    Value        Reference Range Interpretation Comments

 

             AMMONIA (test code = 9891589382) 71 umol/L    9-33         H       

     

 

             Lab Interpretation (test code = Abnormal                           

    



             22635-7)                                            



[facility] Lactic [id number] 00:14:10





             Test Item    Value        Reference Range Interpretation Comments

 

             PH (test code = 2) 7.35         7.35-7.45                 

 

             PCO2 (test code = 25           See_Comment  L             [Automate

d



             [id number])                                         message] The sy

stem



                                                                 which generated



                                                                 this result



                                                                 transmitted



                                                                 reference range

: 35



                                                                 - 45 mmHg. The



                                                                 reference range

 was



                                                                 not used to



                                                                 interpret this



                                                                 result as



                                                                 normal/abnormal

.

 

             PO2 (test code = 158          See_Comment  H             [Automated



             [id number])                                         message] The sy

stem



                                                                 which generated



                                                                 this result



                                                                 transmitted



                                                                 reference range

: 80



                                                                 - 100 mmHg. The



                                                                 reference range

 was



                                                                 not used to



                                                                 interpret this



                                                                 result as



                                                                 normal/abnormal

.

 

             HCO3 (test code = 14           See_Comment  L             [Automate

d



             [id number])                                         message] The sy

stem



                                                                 which generated



                                                                 this result



                                                                 transmitted



                                                                 reference range

: 22



                                                                 - 26 mEq/L. The



                                                                 reference range

 was



                                                                 not used to



                                                                 interpret this



                                                                 result as



                                                                 normal/abnormal

.

 

             BE (test code = -11.2        See_Comment  L             [Automated



             [id number])                                         message] The sy

stem



                                                                 which generated



                                                                 this result



                                                                 transmitted



                                                                 reference range

:



                                                                 -3.0 - 3.0 mEq/

L.



                                                                 The reference r

moose



                                                                 was not used to



                                                                 interpret this



                                                                 result as



                                                                 normal/abnormal

.

 

             THB (test code = 5.6 g/dL     13.5-18.0    LL           



             0714483344)                                         

 

             %O2HB (test code = 97.0 %       94.0-99.0                 



             5666771087)                                         

 

             %COHB ART (test code = 1.6 %        0.0-1.5      H            



             5329455685)                                         

 

             %METHB ART (test code = 0.6 %        0.4-1.5                   



             7876284368)                                         

 

             VOL%O2 ART (test code = 8.0 %        15.0-23.0    L            



             0115066494)                                         

 

             NA (test code = 128 mmol/L   135-145      L            



             2574920621)                                         

 

             K+ (test code = 3.2 mmol/L   3.5-5.0      L            



             5759767073)                                         

 

             AC CA IONZ (test code = 4.10 mg/dL   4.50-5.30    L            



             4865738457)                                         

 

             GLUCOSE (test code = 149 mg/dL           H            



             2083351751)                                         

 

             LACTIC ACID (test code 9.00 mmol/L  0.50-2.20    H            



             = 7923507857)                                        

 

             Lab Interpretation Abnormal                               



             (test code = 05691-4)                                        



[facility] LACTIC [id number] 00:10:44





             Test Item    Value        Reference Range Interpretation Comments

 

             PH (test code = 7.22         7.32-7.42    L            



             6743198622)                                         

 

             PCO2 YANELI (test code = 40           See_Comment  L             [Auto

mated



             7298434513)                                         message] The sy

stem



                                                                 which generated



                                                                 this result



                                                                 transmitted



                                                                 reference range

: 41



                                                                 - 51 mmHg. The



                                                                 reference range

 was



                                                                 not used to



                                                                 interpret this



                                                                 result as



                                                                 normal/abnormal

.

 

             PO2 YANELI (test code = 33           See_Comment                [Autom

ated



             5214203548)                                         message] The sy

stem



                                                                 which generated



                                                                 this result



                                                                 transmitted



                                                                 reference range

: 25



                                                                 - 40 mmHg. The



                                                                 reference range

 was



                                                                 not used to



                                                                 interpret this



                                                                 result as



                                                                 normal/abnormal

.

 

             HCO3 YANELI (test code = 16           See_Comment  L             [Auto

mated



             [id number])                                         message] The sy

stem



                                                                 which generated



                                                                 this result



                                                                 transmitted



                                                                 reference range

: 24



                                                                 - 28 mEq/L. The



                                                                 reference range

 was



                                                                 not used to



                                                                 interpret this



                                                                 result as



                                                                 normal/abnormal

.

 

             AC VBE(BEAKER) (test -10.8        mEq/L                     



             code = 9113160885)                                        

 

             THB YANELI (test code = 6.1 g/dL     13.5-18.0    LL           



             9670085638)                                         

 

             %O2HB YANELI (test code = 37.9 %       52.0-63.0    L            



             8221719868)                                         

 

             %COHB YANELI (test code = 0.7 %        0.0-1.5                   



             1706555506)                                         

 

             %METHB YANELI (test code = 0.9 %        0.4-1.5                   



             4692917765)                                         

 

             VOL%O2 YANELI (test code = 3.3 %        6.0-12.0     L            



             4529722605)                                         

 

             NA (test code = 131 mmol/L   135-145      L            



             4575143225)                                         

 

             K+ (test code = 3.3 mmol/L   3.5-5.0      L            



             9305720629)                                         

 

             AC CA IONZ (test code = 4.20 mg/dL   4.50-5.30    L            



             9531106510)                                         

 

             GLUCOSE (test code = 149 mg/dL           H            



             7785053108)                                         

 

             LACTIC ACID (test code 8.68 mmol/L  0.50-2.20    H            



             = 1014332108)                                        

 

             Lab Interpretation Abnormal                               



             (test code = 04381-2)                                        



[facility] Metabolic Panel (NA, K, CL, CO2, 
GLUCOSE, BUN, CREATININE, CA)2023 22:07:47





             Test Item    Value        Reference Range Interpretation Comments

 

             NA (test code = 134 mmol/L   135-145      L            



             7380402816)                                         

 

             K (test code = 3.1 mmol/L   3.5-5.0      L            



             6829823692)                                         

 

             CL (test code = 103 mmol/L                       



             2246227336)                                         

 

             CO2 TOTAL (test code = 14 mmol/L    23-31        L            



             9199879225)                                         

 

             AGAP (test code = 17           2-16         H            



             1121367420)                                         

 

             BUN (test code = 6 mg/dL      7-23         L            



             5110908519)                                         

 

             GLUCOSE (test code = 153 mg/dL           H            



             9736050315)                                         

 

             CREATININE (test code = 0.54 mg/dL   0.60-1.25    L            



             8796332075)                                         

 

             CALCIUM (test code = 7.1 mg/dL    8.6-10.6     L            



             4239442944)                                         

 

             eGFR (test code = 158.0        mL/min/1.73m2              



             8970264683)                                         

 

             RENETTA (test code = RENETTA) [employer] of                           



                          Glomerular Filtration                           



                          Rate (GFR) and Staging                           



                          of Kidney Disease*                           



                          +---------------------                           



                          --+-------------------                           



                          --+-------------------                           



                          ------+| GFR                           



                          (mL/min/1.73 m2) ?|                           



                          With Kidney Damage ?|                           



                          ?Without Kidney                           



                          Damage+---------------                           



                          --------+-------------                           



                          --------+-------------                           



                          ------------+| ?>90 ?                           



                          ? ? ? ? ? ? ? ?|                           



                          ?Stage one ? ? ? ? ?|                           



                          ? Normal ? ? ? ? ? ? ?                           



                          ?+--------------------                           



                          ---+------------------                           



                          ---+------------------                           



                          -------+| ?60-89 ? ? ?                           



                          ? ? ? ? ?| ?Stage two                           



                          ? ? ? ? ?| ? Decreased                           



                          GFR ? ? ? ?                            



                          +---------------------                           



                          --+-------------------                           



                          --+-------------------                           



                          ------+| ?30-59 ? ? ?                           



                          ? ? ? ? ?| ?Stage                           



                          three ? ? ? ?| ? Stage                           



                          three ? ? ? ? ?                           



                          +---------------------                           



                          --+-------------------                           



                          --+-------------------                           



                          ------+| ?15-29 ? ? ?                           



                          ? ? ? ? ?| ?Stage four                           



                          ? ? ? ? | ? Stage four                           



                          ? ? ? ? ?                              



                          ?+--------------------                           



                          ---+------------------                           



                          ---+------------------                           



                          -------+| ?<15 (or                           



                          dialysis) ? ?| ?Stage                           



                          five ? ? ? ? | ? Stage                           



                          five ? ? ? ? ?                           



                          ?+--------------------                           



                          ---+------------------                           



                          ---+------------------                           



                          -------+ *Each stage                           



                          assumes the associated                           



                          GFR level has been in                           



                          effect for at least                           



                          three months. ?Stages                           



                          1 to 5, with or                           



                          without kidney                           



                          disease, indicate                           



                          chronic kidney                           



                          disease. Notes:                           



                          Determination of                           



                          stages one and two                           



                          (with eGFR                             



                          >59mL/min/1.73 m2)                           



                          requires estimation of                           



                          kidney damage for at                           



                          least three months as                           



                          defined by structural                           



                          or functional                           



                          abnormalities of the                           



                          kidney, manifested by                           



                          either:Pathological                           



                          abnormalities or                           



                          Markers of kidney                           



                          damage (including                           



                          abnormalities in the                           



                          composition of the                           



                          blood or urine or                           



                          abnormalities in                           



                          imaging tests).                           

 

             Lab Interpretation Abnormal                               



             (test code = 26605-6)                                        



[facility] / [id number] 22:04:46





             Test Item    Value        Reference Range Interpretation Comments

 

             PROTIME PATIENT (test 27.5         See_Comment  H             [Auto

mated message]



             code = 5964-2)                                        The system [employer]



                                                                 generated this 

result



                                                                 transmitted ref

erence



                                                                 range: 10.1 - 1

2.6



                                                                 Seconds. The



                                                                 reference range

 was



                                                                 not used to int

erpret



                                                                 this result as



                                                                 normal/abnormal

.

 

             INR (test code = 6301-6) 2.5                                    Nor

mal INR <1.1;



                                                                 Warfarin Therap

eutic



                                                                 range 2.0 to 3.

0 or



                                                                 2.5 to 3.5, dep

ending



                                                                 upon the indica

tions.

 

             Lab Interpretation (test Abnormal                               



             code = 47167-5)                                        



[facility] 22:04:46





             Test Item    Value        Reference Range Interpretation Comments

 

             APTT Patient (test code 42           See_Comment  H             [Au

tomated message]



             = 5203-2)                                           The system [employer]



                                                                 generated this 

result



                                                                 transmitted ref

erence



                                                                 range: 26 - 36



                                                                 Seconds. The



                                                                 reference range

 was



                                                                 not used to int

erpret



                                                                 this result as



                                                                 normal/abnormal

.

 

             Lab Interpretation (test Abnormal                               



             code = 02123-7)                                        



[facility]Profile / [id number] 22:02:45





             Test Item    Value        Reference Range Interpretation Comments

 

             WBC (test code = 3.90         See_Comment  L             [Automated

 message]



             5190-2)                                             The system [employer]



                                                                 generated this 

result



                                                                 transmitted ref

erence



                                                                 range: 4.20 - 1

0.70



                                                                 10*3/?L. The



                                                                 reference range

 was



                                                                 not used to int

erpret



                                                                 this result as



                                                                 normal/abnormal

.

 

             RBC (test code = 789-8) 2.63         See_Comment  L             [Au

tomated message]



                                                                 The system [employer]



                                                                 generated this 

result



                                                                 transmitted ref

erence



                                                                 range: 4.26 - 5

.52



                                                                 10*6/?L. The



                                                                 reference range

 was



                                                                 not used to int

erpret



                                                                 this result as



                                                                 normal/abnormal

.

 

             HGB (test code = 718-7) 5.8 g/dL     12.2-16.4    L            

 

             HCT (test code = 20.1 %       38.4-49.3    L            



             4544-3)                                             

 

             MCH (test code = 785-6) 22.1 pg      26.1-32.7    L            

 

             MCV (test code = 787-2) 76.4 fL      81.7-95.6    L            

 

             MCHC (test code = 28.9 g/dL    31.2-35.0    L            



             786-4)                                              

 

             PLT (test code = 777-3) 61           See_Comment  L             [Au

tomated message]



                                                                 The system [employer]



                                                                 generated this 

result



                                                                 transmitted ref

erence



                                                                 range: 150 - 32

8



                                                                 10*3/?L. The



                                                                 reference range

 was



                                                                 not used to int

erpret



                                                                 this result as



                                                                 normal/abnormal

.

 

             MPV (test code =                                        Not Measure

d



             14630-9)                                            

 

             RDW-CV (test code = 21.4 %       12.1-15.4    H            



             788-0)                                              

 

             RDW-SD (test code = 59.4 fL      38.5-51.6    H            



             17930-3)                                            

 

             NRBC x10^3 (test code =              See_Comment                [Au

tomated message]



             3127353969)                                         The system [employer]



                                                                 generated this 

result



                                                                 transmitted ref

erence



                                                                 range: 10*3/?L.

 The



                                                                 reference range

 was



                                                                 not used to int

erpret



                                                                 this result as



                                                                 normal/abnormal

.

 

             NRBC/100 WBC (test code 0.0          See_Comment                [Au

tomated message]



             = [id number])                                        The system [employer]





                                                                 generated this 

result



                                                                 transmitted ref

erence



                                                                 range: 0.0 - 10

.0



                                                                 /100 WBCs. The



                                                                 reference range

 was



                                                                 not used to int

erpret



                                                                 this result as



                                                                 normal/abnormal

.

 

             IPF % (test code = 4.9 %        1.2-10.7                  Platelet 

count



             [id number])                                         measured by



                                                                 fluorescence me

thod.

 

             Lab Interpretation Abnormal                               



             (test code = 30816-8)                                        



[facility] HospitalType and Screen - The Type and Screen expires 
at midnight on the 3rd day after it was drawn. A current Type and Screen is 
required when RBCs are requested. For all other blood products, a Type and Scree
n performed during the current hospitalizati...2023 21:57:00





             Test Item    Value        Reference Range Interpretation Comments

 

             ABO & RH (test code = 20) O POSITIVE                             

 

             IAT (test code = 1185) Negative                               



[facility] HospitalEGD[facility]22-[facility] 15:59:00TEXTPatient Name UMU TABARES of Birth 1967Record Number [id number]/Time of Procedure 
2022, 3:59:00 PMEndoscopist Tressa Santoro MD./Fellow 
Jorge Moreno INDICATIONS FOR EXAMINATION: Anemia unspecified. PROCEDURE 
PERFORMED: EGD - EGD, diagnostic INSTRUMENTS:[id number]: None TOLERANCE:
Good VISUALIZATION: Good MEDICATIONS: MAC AnesthesiaASA CLASSIFICATION: 
IIIANALGESIA: TIVA by anesthesia PROCEDURE TECHNIQUE:Prior to the procedure, a 
History and Physical was performed, and patient medications and allergies were 
reviewed. The risks and benefits of the procedure and the sedation options and 
risks were discussed with the patient. Consent was obtained. Pulse, blood oxygen
saturation, and blood pressure were monitored throughout the procedure. After a
dequate sedation, the endoscope was introduced through the mouth and under 
direct visualization advanced to the Second Part of Duodenum. Careful 
examination of the esophagus, stomach and duodenum was performed. FINDINGS:The 
upper, middle, and lower esophagus appeared normal. There were small varices th
at flattened with insufflationThe GE junction was normal.The gastric cardia, 
fundus, and body were normal.The were two 5 mm clean based ulcers at the distal 
antrum. No biopsies were taken.The pylorus, duodenal bulb, and descending 
duodenum through the second portion of the duodenum appeared normal. SPECIMEN 
COLLECTED: No ENDOSCOPIC DIAGNOSIS:Small gastric varices Two distal antrum 5mm 
clean based ulcers RECOMMENDATIONS:Test for H. Pylori with stool antigen. Treat 
if positive. Start PPI BID x 6-8 weeks Follow up outpatient with 
gastroenterology clinic COMPLICATIONS:None.  Intra-procedure complication: none;
ESTIMATED BLOOD LOSS: None BLOOD PRODUCTS ADMINISTERED: NoneGRAFT/IMPLANT: None 
TOTAL PROCEDURE TIME: TOTAL SEDATION TIME: COMMENTS: CPT CODE:13761-YT 
Esophagogastroduodenoscopy, flexible, transoral; diagnostic, including 
collection of specimen(s) by brushing or washing, when peICD CODE:D64.9 Anemia, 
unspecified I was present during the entire viewing portion of the procedure. I 
personally reviewed the images and report prepared by the resident or fellow and
agree with the findings. After the procedure was completed, the patient was 
taken to the recovery in good condition. This Procedure was electronically 
signed of on :10/13/2022 1:52:32 PM By Tressa Broussard [facility]
2022 15:59:00TEXTPatient Name UMU TABARES of Birth 
1967Record Number [id number]/Time of Procedure 2022, 3:59:00 
PMEndoscopist Tressa Santoro MD./Fellow Jorge BISHOP
CATIONS FOR EXAMINATION: Anemia unspecified. PROCEDURE PERFORMED:  EGD - EGD, 
diagnostic INSTRUMENTS: [id number]: None TOLERANCE: Good VISUALIZATION: 
Good MEDICATIONS: MAC AnesthesiaASA CLASSIFICATION: IIIANALGESIA: TIVA by 
anesthesia PROCEDURE TECHNIQUE:Prior to the procedure, a History and Physical 
was performed, and patient medications and allergies were reviewed. The risks 
and benefits ofthe procedure and the sedation options and risks were discussed 
with the patient. Consent was obtained. Pulse, blood oxygen saturation, and 
blood pressure were monitored throughout the procedure. Afteradequate sedation, 
the endoscope was introduced through the mouth and under direct visualization 
advanced to the Second Part of Duodenum. Careful examination of the esophagus, 
stomach and duodenum was performed. FINDINGS:The upper, middle, and lower 
esophagus appeared normal. There were small varices that flattened with 
insufflationThe GE junction was normal.The gastric cardia, fundus, and body were
normal.The were two 5 mm clean based ulcers at the distal antrum. No biopsies 
were taken.The pylorus,duodenal bulb, and descending duodenum through the second
portion of the duodenum appeared normal. SPECIMEN COLLECTED: No ENDOSCOPIC 
DIAGNOSIS:Small gastric varices Two distal antrum 5mm clean based ulcers 
RECOMMENDATIONS:Test for H. Pylori with stool antigen. Treat if positive. Start 
PPI BID x 6-8 weeks Follow up outpatient with gastroenterology clinic 
COMPLICATIONS:None. Intra-procedure complication: none; ESTIMATED BLOOD LOSS: 
None BLOOD PRODUCTS ADMINISTERED: NoneGRAFT/IMPLANT: None TOTAL PROCEDURE TIME: 
TOTAL SEDATION TIME: COMMENTS: CPT CODE:93290-RJ Esophagogastroduodenoscopy, 
flexible, transoral; diagnostic, including collection of specimen(s) by brushing
or washing, when peICD CODE:D64.9 Anemia, unspecified I was present during the 
entire viewing portion of the procedure. I personally reviewed the images and 
report prepared by the resident or fellow and agree with the findings. After the
procedure was completed, the patient was taken to the recovery in good 
condition. This Procedure was electronically signed of on :10/13/2022 1:52:32 PM
By Tressa Broussard [facility] RBC Units, 1 [id number] 
11:42:00





             Test Item    Value        Reference Range Interpretation Comments

 

             RBC UNITS (test code = compatible                             



             55602746)                                           

 

             Unit ABO Type (test code = O                                      



             40447308)                                           

 

             Unit Rh Type (test code = POS                                    



             21714945)                                           

 

             Product Code (test code =                                   



             13702999)                                           

 

             Unit Number (test code = [id number]                           



             18642763)                                           

 

             Unit Status (test code = transfused                             



             15708731)                                           

 

             ISBT Product Code (test code = P6794K43                            

   



             66195)                                              

 

             Blood Type (test code = 25062) 5100                                

   

 

             Blood Expiration Date (test                            



             code = 63544)                                        



[facility] [id number] 11:42:00





             Test Item    Value        Reference Range Interpretation Comments

 

             RBC UNITS (test code = compatible                             



             10434835)                                           

 

             Unit ABO Type (test code = O                                      



             84593400)                                           

 

             Unit Rh Type (test code = POS                                    



             15093639)                                           

 

             Product Code (test code =                                   



             35262385)                                           

 

             Unit Number (test code = [id number]                           



             95683474)                                           

 

             Unit Status (test code = transfused                             



             94682283)                                           

 

             ISBT Product Code (test code = H3260M86                            

   



             48761)                                              

 

             Blood Type (test code = 43560) 5100                                

   

 

             Blood Expiration Date (test                            



             code = 93541)                                        



[facility] [id number] 11:42:00





             Test Item    Value        Reference Range Interpretation Comments

 

             RBC UNITS (test code = compatible                             



             95535873)                                           

 

             Unit ABO Type (test code = O                                      



             63599842)                                           

 

             Unit Rh Type (test code = POS                                    



             16900578)                                           

 

             Product Code (test code =                                   



             30167685)                                           

 

             Unit Number (test code = [id number]                           



             66474900)                                           

 

             Unit Status (test code = transfused                             



             74398537)                                           

 

             ISBT Product Code (test code = W0549J04                            

   



             93209)                                              

 

             Blood Type (test code = 73163) 5100                                

   

 

             Blood Expiration Date (test                            



             code = 79985)                                        



[facility], 1 [id number] 10:08:00





             Test Item    Value        Reference Range Interpretation Comments

 

             Apheresis Platelets, not required                           



             Leukoreduced (test code =                                        



             48549401)                                           

 

             Unit ABO Type (test code = A                                      



             85614973)                                           

 

             Unit Rh Type (test code = NEG                                    



             08442944)                                           

 

             Product Code (test code =                                   



             70768695)                                           

 

             Unit Number (test code = [id number]                           



             76586030)                                           

 

             Unit Status (test code = transfused                             



             00097552)                                           

 

             ISBT Product Code (test code = G7265T01                            

   



             01192)                                              

 

             Blood Type (test code = 05234) 0600                                

   

 

             Blood Expiration Date (test                            



             code = 98854)                                        



[facility] [id number] 10:08:00





             Test Item    Value        Reference Range Interpretation Comments

 

             Apheresis Platelets, not required                           



             Leukoreduced (test code =                                        



             68258678)                                           

 

             Unit ABO Type (test code = A                                      



             57523737)                                           

 

             Unit Rh Type (test code = NEG                                    



             40857787)                                           

 

             Product Code (test code =                                   



             71579973)                                           

 

             Unit Number (test code = [id number]                           



             51154459)                                           

 

             Unit Status (test code = transfused                             



             21386931)                                           

 

             ISBT Product Code (test code = W0119L30                            

   



             60816)                                              

 

             Blood Type (test code = 87295) 0600                                

   

 

             Blood Expiration Date (test [id number]                           



             code = 37527)                                        



[facility], 1 [id number] 10:08:00





             Test Item    Value        Reference Range Interpretation Comments

 

             Apheresis Platelets, not required                           



             Leukoreduced (test code =                                        



             96231407)                                           

 

             Unit ABO Type (test code = A                                      



             55939212)                                           

 

             Unit Rh Type (test code = NEG                                    



             90340675)                                           

 

             Product Code (test code =                                   



             50902659)                                           

 

             Unit Number (test code = [id number]                           



             [id number])                                           

 

             Unit Status (test code = transfused                             



             13422514)                                           

 

             ISBT Product Code (test code = K8735T75                            

   



             50228)                                              

 

             Blood Type (test code = 58446) 0600                                

   

 

             Blood Expiration Date (test [id number]                           



             code = 35988)                                        



[facility] HealthCoronavirus, CoVID-19, [id number] 13:45:44





             Test Item    Value        Reference Range Interpretation Comments

 

             COVID-19 (SARS-COV-2) Detected     Not Detected A            INTERP

RETATION: This



             (test code = 06297-7)                                        patien

t's sample had



                                                                 detectable RNA 

present



                                                                 for the SARS-Co

V-2



                                                                 Coronavirus (CO

VID-19).



                                                                 A result with



                                                                 detectable RNA 

does not



                                                                 indicate the se

verity



                                                                 of infection. T

his



                                                                 result should b

e



                                                                 interpreted in



                                                                 conjunction wit

h



                                                                 clinical, radio

graphic,



                                                                 and other labor

atory



                                                                 findings and sh

ould not



                                                                 be used as the 

sole



                                                                 indicator of ac

tive



                                                                 infection with



                                                                 SARS-CoV-2 Citlalli

navirus



                                                                 (COVID-19). COM

MENT:



                                                                 This Hologic Ap

saulo



                                                                 SARS-CoV-2 mole

cular



                                                                 diagnostic assa

y



                                                                 utilizes Transc

ription



                                                                 Mediated Amplif

ication



                                                                 (TMA) technolog

y to



                                                                 rapidly detect 

the



                                                                 SARS-CoV-2 (COV

ID-19)



                                                                 virus from resp

iratory



                                                                 samples. In acc

ordance



                                                                 with the FDA's 

guidance



                                                                 document "Polic

y for



                                                                 Diagnostic Test

s for



                                                                 Coronavirus



                                                                 Disease-2019 du

ring the



                                                                 Public Health



                                                                 Emergency", thi

s test



                                                                 was developed, 

and its



                                                                 performance



                                                                 characteristics

 were



                                                                 verified by the

 [facility]

lar



                                                                 diagnostics lab

oratory



                                                                 and is authoriz

ed for



                                                                 clinical diagno

stic



                                                                 use. This labor

atory is



                                                                 certified under

 the



                                                                 Clinical Labora

tory



                                                                 Improvement Cira

ndments



                                                                 (CLIA) as quali

fied to



                                                                 perform high co

mplexity



                                                                 clinical labora

tory



                                                                 testing.

 

             Lab Interpretation Abnormal                               



             (test code = 36550-3)                                        



[facility] HealthCoronavirus, CoVID-19, [id number] 13:45:44





             Test Item    Value        Reference Range Interpretation Comments

 

             COVID-19 (SARS-COV-2) Detected     Not Detected A            INTERP

RETATION: This



             (test code = 13898-9)                                        patien

t's sample had



                                                                 detectable RNA 

present



                                                                 for the SARS-Co

V-2



                                                                 Coronavirus (CO

VID-19).



                                                                 A result with



                                                                 detectable RNA 

does not



                                                                 indicate the se

verity



                                                                 of infection. T

his



                                                                 result should b

e



                                                                 interpreted in



                                                                 conjunction wit

h



                                                                 clinical, radio

graphic,



                                                                 and other labor

atory



                                                                 findings and sh

ould not



                                                                 be used as the 

sole



                                                                 indicator of ac

tive



                                                                 infection with



                                                                 SARS-CoV-2 Citlalli

navirus



                                                                 (COVID-19). COM

MENT:



                                                                 This Hologic Ap

saulo



                                                                 SARS-CoV-2 mole

cular



                                                                 diagnostic assa

y



                                                                 utilizes Transc

ription



                                                                 Mediated Amplif

ication



                                                                 (TMA) technolog

y to



                                                                 rapidly detect 

the



                                                                 SARS-CoV-2 (COV

ID-19)



                                                                 virus from resp

iratory



                                                                 samples. In acc

ordance



                                                                 with the FDA's 

guidance



                                                                 document "Polic

y for



                                                                 Diagnostic Test

s for



                                                                 Coronavirus



                                                                 Disease-2019 du

ring the



                                                                 Public Health



                                                                 Emergency", thi

s test



                                                                 was developed, 

and its



                                                                 performance



                                                                 characteristics

 were



                                                                 verified by the

 [facility]

lar



                                                                 diagnostics lab

oratory



                                                                 and is authoriz

ed for



                                                                 clinical diagno

stic



                                                                 use. This labor

atory is



                                                                 certified under

 the



                                                                 Clinical Labora

tory



                                                                 Improvement Cira

ndments



                                                                 (CLIA) as quali

fied to



                                                                 perform high co

mplexity



                                                                 clinical labora

tory



                                                                 testing.

 

             Lab Interpretation Abnormal                               



             (test code = 21864-7)                                        



[facility] HealthCoronavirus, CoVID-19, [id number] 13:45:44





             Test Item    Value        Reference Range Interpretation Comments

 

             COVID-19 (SARS-COV-2) Detected     Not Detected A            INTERP

RETATION: This



             (test code = 98942-1)                                        patien

t's sample had



                                                                 detectable RNA 

present



                                                                 for the SARS-Co

V-2



                                                                 Coronavirus (CO

VID-19).



                                                                 A result with



                                                                 detectable RNA 

does not



                                                                 indicate the se

verity



                                                                 of infection. T

his



                                                                 result should b

e



                                                                 interpreted in



                                                                 conjunction wit

h



                                                                 clinical, radio

graphic,



                                                                 and other labor

atory



                                                                 findings and sh

ould not



                                                                 be used as the 

sole



                                                                 indicator of ac

tive



                                                                 infection with



                                                                 SARS-CoV-2 Citlalli

navirus



                                                                 (COVID-19). COM

MENT:



                                                                 This Hologic Ap

saulo



                                                                 SARS-CoV-2 mole

cular



                                                                 diagnostic assa

y



                                                                 utilizes Transc

ription



                                                                 Mediated Amplif

ication



                                                                 (TMA) technolog

y to



                                                                 rapidly detect 

the



                                                                 SARS-CoV-2 (COV

ID-19)



                                                                 virus from resp

iratory



                                                                 samples. In acc

ordance



                                                                 with the FDA's 

guidance



                                                                 document "Polic

y for



                                                                 Diagnostic Test

s for



                                                                 Coronavirus



                                                                 Disease-2019 du

ring the



                                                                 Public Health



                                                                 Emergency", thi

s test



                                                                 was developed, 

and its



                                                                 performance



                                                                 characteristics

 were



                                                                 verified by the

 [facility]

lar



                                                                 diagnostics lab

oratory



                                                                 and is authoriz

ed for



                                                                 clinical diagno

stic



                                                                 use. This labor

atory is



                                                                 certified under

 the



                                                                 Clinical Labora

tory



                                                                 Improvement Cira

ndments



                                                                 (CLIA) as quali

fied to



                                                                 perform high co

mplexity



                                                                 clinical labora

tory



                                                                 testing.

 

             Lab Interpretation Abnormal                               



             (test code = 76938-6)                                        



[facility]-CoV-2 ORF1ab Resp Ql WILLIAM+[id number] 13:45:44





             Test Item    Value        Reference Range Interpretation Comments

 

             Hospitalized? (test Yes                                    



             code = 24227-1)                                        

 

             ICU? (test code = No                                     



             49711-8)                                            

 

             Symptomatic as defined No                                     



             by CDC? (test code =                                        



             28190-7)                                            

 

             Employed in  No                                     



             Healthcare? (test code                                        



             = 70190-8)                                          

 

             Resident in a No                                     



             congregate care                                        



             setting (including                                        



             nursing homes,                                        



             residential care for                                        



             people with                                         



             intellectual and                                        



             developmental                                        



             disabilities,                                        



             psychiatric treatment                                        



             facilities, group                                        



             homes, board and care                                        



             homes, homeless                                        



             shelter, foster care                                        



             or other): (test code                                        



             = 96559-3)                                          

 

             SARS-CoV-2 ORF1ab Resp DETECTED     Not Detected A            INTER

PRETATION: This



             Ql WILLIAM+probe (test                                        patient's

 sample had



             code = 96072-8)                                        detectable R

NA present



                                                                 for the SARS-Co

V-2



                                                                 Coronavirus (CO

VID-19).



                                                                 A result with



                                                                 detectable RNA 

does not



                                                                 indicate the se

verity



                                                                 of infection. T

his



                                                                 result should b

e



                                                                 interpreted in



                                                                 conjunction wit

h



                                                                 clinical, radio

graphic,



                                                                 and other labor

atory



                                                                 findings and sh

ould not



                                                                 be used as the 

sole



                                                                 indicator of ac

tive



                                                                 infection with



                                                                 SARS-CoV-2 Citlalli

navirus



                                                                 (COVID-19). COM

MENT:



                                                                 This Hologic Ap

saulo



                                                                 SARS-CoV-2 mole

cular



                                                                 diagnostic assa

y



                                                                 utilizes Transc

ription



                                                                 Mediated Amplif

ication



                                                                 (TMA) technolog

y to



                                                                 rapidly detect 

the



                                                                 SARS-CoV-2 (COV

ID-19)



                                                                 virus from resp

iratory



                                                                 samples. In acc

ordance



                                                                 with\XC2A0\the 

FDA's



                                                                 guidance docume

nt



                                                                 "Policy for Mikayla

gnostic



                                                                 Tests for Coron

avirus



                                                                 Disease-2019 du

ring the



                                                                 Public Health



                                                                 Emergency", jenni

s test



                                                                 was developed, 

and its



                                                                 performance



                                                                 characteristics

 were



                                                                 verified by the

 [facility]

lar



                                                                 diagnostics lab

oratory



                                                                 and is authoriz

ed for



                                                                 clinical diagno

stic



                                                                 use. \XC2A0\Thi

s



                                                                 laboratory is c

ertified



                                                                 under the [facility]

[facility]



                                                                 Laboratory Impr

ovement



                                                                 Amendments (CLI

A) as



                                                                 qualified to pe

rform



                                                                 high complexity



                                                                 clinical labora

tory



                                                                 testing.



HHSHAV IgG Ser [id number] 15:17:48





             Test Item    Value        Reference Range Interpretation Comments

 

             HAV IgG Ser Ql (test code = 39071-1) POSITIVE     Negative     A   

         



[facility] Lead [id number] 09:29:5912 LEAD EKG FOR [facility] 
Test Date: [id number] Name: UMU TABARES Department: 5BMSPatient ID: 
[id number] Room: Gender: M Technician: ToshiaOB:  1967 Requested By: 
MARCY Massey Number: [id number] Anya MD: Linda Obrien 
MeasurementsIntervals  Axis Rate: 86 P: -2PR: 97 QRS: 50QRSD: 84 T: 24QT:  428 
QTc: 471 Interpretive StatementsSINUS RHYTHM WITH SHORT OR INTERVALPROLONGED QT 
INTERVALElectronically Signed On 2022 14:22:26 CDT by Linda Abrams
[facility]12 Lead [id number] 09:29:5912 LEAD EKG FOR [facility] Test Date: 0613-39-12Vyo Name: UMU TABARES Department: 5BMSPatient 
ID: [id number] Room: Gender: M Technician: AnnieDOB:  1967 Requested By: 
MARCY Massey Number: [id number] Reading MD: Linda Obrien 
MeasurementsIntervals  Axis Rate: 86 P: -2PR: 97 QRS: 50QRSD: 84 T: 24QT:  428 
QTc: 471 Interpretive StatementsSINUS RHYTHM WITH SHORT OR INTERVALPROLONGED QT 
INTERVALElectronically Signed On 2022 14:22:26 CDT [place] [address] Lead [id number] 09:29:5912 LEAD EKG FOR [facility] Test Date: 7540-00-77Iqq Name: UMU TABARES Department: 5BMSPatient 
ID: [id number] Room: Gender: M Technician: AnnieDOB:  1967 Requested By: 
MARCY Massey Number: [id number] Reading MD: Linda Obrien 
MeasurementsIntervals  Axis Rate: 86 P: -2PR: 97 QRS: 50QRSD: 84 T: 24QT:  428 
QTc: 471 Interpretive StatementsSINUS RHYTHM WITH SHORT OR INTERVALPROLONGED QT 
INTERVALElectronically Signed On 2022 14:22:26 CDT by Linda Abrams
[facility] 09[facility]15:17





             Test Item    Value        Reference Range Interpretation Comments

 

             Glucose POC (test code = 17469975) 137 mg/dL           H     

       

 

             Lab Interpretation (test code = Abnormal                           

    



             51454-9)                                            



[facility]2022-07-11 09:15:17





             Test Item    Value        Reference Range Interpretation Comments

 

             Glucose POC (test code = 41725775) 137 mg/dL           H     

       

 

             Lab Interpretation (test code = Abnormal                           

    



             63594-9)                                            



[facility]2022-07-11 09:15:17





             Test Item    Value        Reference Range Interpretation Comments

 

             Glucose POC (test code = 23204503) 137 mg/dL           H     

       

 

             Lab Interpretation (test code = Abnormal                           

    



             23330-4)                                            



[id number] 10:34:38





             Test Item    Value        Reference Range Interpretation Comments

 

             T pallidum Ab Ser Ql Aggl (test NEGATIVE     Negative, Equivocal   

           



             code = 76860-5)                                        

 

             Reagin+T pallidum IgG+IgM NEGATIVE     Negative                  



             SerPl-Imp (test code = 79597-7)                                    

    



HHSHIV 1+2 Ab+HIV1 p24 Ag SerPl Ql [id number] 05:50:04





             Test Item    Value        Reference Range Interpretation Comments

 

             HIV 1+2 Ab+HIV1 p24 Ag SerPl Ql IA NEGATIVE     Negative           

       



             (test code = 10038-4)                                        



GVRPMQK-ZlE-5 ORF1ab Resp Ql WILLIAM+qlesf0336-76-30 04:05:45





             Test Item    Value        Reference Range Interpretation Comments

 

             Hospitalized? (test No                                     



             code = 74786-2)                                        

 

             ICU? (test code = No                                     



             53451-7)                                            

 

             Symptomatic as No                                     



             defined by CDC?                                        



             (test code =                                        



             96354-5)                                            

 

             Employed in  No                                     



             Healthcare? (test                                        



             code = 72028-2)                                        

 

             Resident in a No                                     



             congregate care                                        



             setting (including                                        



             nursing homes,                                        



             residential care for                                        



             people with                                         



             intellectual and                                        



             developmental                                        



             disabilities,                                        



             psychiatric                                         



             treatment                                           



             facilities, group                                        



             homes, board and                                        



             care homes, homeless                                        



             shelter, foster care                                        



             or other): (test                                        



             code = 81403-5)                                        

 

             SARS-CoV-2 ORF1ab NOT DETECTED Not Detected              INTERPRETA

TION: No



             Resp Ql WILLIAM+probe                                        detectable

 levels of



             (test code =                                        SARS-CoV-2



             56469-3)                                            Coronavirus



                                                                 (COVID-19) were



                                                                 present in this



                                                                 patient's sampl

e by



                                                                 this test. A no

t



                                                                 detected result

 does



                                                                 not exclude the



                                                                 possibility of 

active



                                                                 infection with 

this



                                                                 virus due to ot

her



                                                                 factors that ma

y



                                                                 affect the resu

lts



                                                                 such as a poorl

y



                                                                 collected sampl

e,



                                                                 viral titers be

low



                                                                 the limit of



                                                                 detection of th

e



                                                                 assay, and the



                                                                 infrequent



                                                                 possibility of



                                                                 inhibitors in t

he



                                                                 sample. This re

sult



                                                                 should be inter

preted



                                                                 in conjunction 

with



                                                                 clinical,



                                                                 radiographic, a

nd



                                                                 other laborator

y



                                                                 findings and sh

ould



                                                                 not be used as 

the



                                                                 sole indicator 

of



                                                                 active infectio

n with



                                                                 SARS-CoV-2



                                                                 Coronavirus



                                                                 (COVID-19).COMM

ENT:



                                                                 This Hologic Ap

saulo



                                                                 SARS-CoV-2 mole

cular



                                                                 diagnostic assa

y



                                                                 utilizes



                                                                 Transcription



                                                                 Mediated



                                                                 Amplification (

TMA)



                                                                 technology to r

apidly



                                                                 detect the SARS

-CoV-2



                                                                 (COVID-19) viru

s from



                                                                 respiratory shahriar

ples.



                                                                 In accordance



                                                                 with\XC2A0\the 

FDA's



                                                                 guidance docume

nt



                                                                 "Policy for



                                                                 Diagnostic Test

s for



                                                                 Coronavirus



                                                                 Disease- du

ring



                                                                 the Public Heal

th



                                                                 Emergency", jenni

s test



                                                                 was developed, 

and



                                                                 its performance



                                                                 characteristics

 were



                                                                 verified by the



                                                                 [facility]



                                                                 molecular diagn

ostics



                                                                 laboratory and 

is



                                                                 authorized for



                                                                 clinical diagno

stic



                                                                 use. \XC2A0\Jenni

s



                                                                 laboratory is



                                                                 certified under

 the



                                                                 Clinical Labora

tory



                                                                 Improvement



                                                                 Amendments (CLI

A) as



                                                                 qualified to pe

rform



                                                                 high complexity



                                                                 clinical labora

tory



                                                                 testing.



[facility]12 Lead [id number] 17:14:4312 LEAD EKG FOR [facility] 
Test Date: 7570-05-12Zyk Name: UMU [facility] Department: 5520Patient ID: 
[id number]  Room:  POD  Gender: M Technician: [id number]: 1967 
Requested By: DENISE Saravia Number: [id number] Reading MD: Linda Obrien 
MeasurementsIntervals Axis Rate: 91 P: 24PR: 124 QRS: 50QRSD: 89 T: 41QT: 375 
QTc: 423  Interpretive StatementsSINUS RHYTHMElectronically Signed On 2022 
21:25:07 CDT by Linda Obrien [place] [address] Lead [id number] 17:14:4312
LEAD EKG FOR [facility]  Test Date: 5924-79-72Syg Name: 
[facility] Department: 5520Patient ID: [id number] Room:  POD F 01Gender: M
Technician: [id number]: 1967 Requested By: DENISE Saravia Number: 
[id number] Reading MD: Linda Obrien  MeasurementsIntervals Axis Rate: 91 P: 
24PR: 124 QRS:  50QRSD: 89 T: 41QT: 375 QTc: 423 Interpretive StatementsSINUS 
RHYTHMElectronically Signed On 2022 21:25:07 CDT by Linda Obrien [facility]12 Lead [id number] 17:14:4312 LEAD EKG FOR [facility]  Test Date: 8990-14-08Wmi Name: UMU TABARES Department: 5520Patient
ID: [id number] Room: [facility] 01Gender: M  Technician: [id number]: 1967 
Requested By: DENISE Saravia Number: [id number] Reading MD: Linda Obrien 
MeasurementsIntervals Axis Rate: 91 P: 24PR: 124 QRS: 50QRSD: 89  T: 41QT: 375 
QTc: 423 Interpretive StatementsSINUS RHYTHMElectronically Signed On 2022 
21:25:07 CDT by Linda Obrien [facility] BODY [id number] 
17:50:22





             Test Item    Value        Reference Range Interpretation Comments

 

             ALBUMIN BF (test code 279.0 mg/dL                            



             = [id number])                                        

 

             RENETTA (test code = RENETTA) Result interpreted                           



                          relative to the serum                           



                          concentration. ?                           



[facility] [id number] 
15:55:23COVID DMT InterpretationInterpretation/Recommendations:Molecular NAAT 
Tests for Active Infection with the SARS-CoV-2 Virus:The patient has currently 
tested negative for the SARS-CoV-2 virus that causesCOVID-19 illness. This most 
likely indicates that the patient does not have an active infection withthe 
SARS-CoV-2 virus. However, infection is not completely ruled out as the false 
negative rate for molecular NAAT testing using a nasopharyngeal sample can be up
to 30%, mostly dependent on the timingof sample collection in relation to 
illness onset and any deficiencies in sampling techniques. If the patient has 
symptoms concerning for COVID-19 illness, a repeat NAAT test (PCR, Rapid ID Now,
etc.) should be performed, at which time the SARS-CoV-2 virus - if present - may
have reached a detectable viral load (usually peaking by the end of the first 
week of symptoms). Tests for IgM and/or IgG Antibodies to the SARS-CoV-2 
Virus:If the patient develops COVID-19 illness in the future, testing for IgMand
IgG antibodies approximately 3 weeks after illness onset will likely indicate if
the patient hasproduced antibodies to the SARS-CoV-2 virus. However, some 
patients may take longer to develop detectable antibodies, while some patients 
who were infected with SARS-CoV-2 may never develop antibodies.While antibodies 
to SARS-CoV-2 may provide some degree of immunity, at this time the strength and
duration of the antibody response is unknown. 
------------------------------------------------------------------------ 
Interpretation Result Comments:These interpretation comments are based upon all 
COVID-19 testing the patient has had at [facility], including molecular NAAT testing 
(more commonly known as PCRtesting and Rapid ID Now testing) and antibody 
testing. It does not take into account any testing that a patient has had 
outside of the [facility] medical record. [facility] LABORATORY SERVICESCOVID 
LycwgmgUJIA-OjN-0 Rapid ID NOW (no units) ? ? Date ? ? ? ? ? ? ? ? ? ? Value ? ?
? ? ? ? ? 2021 ? ? ? ? ? ? ?Not Detected ? ? ? 2021 ? ? ? ? ? ? ? 
Not Detected ? ---------- [facility] LABORATORY SERVICESUnB[facility] IrvingCBC with Rrgvsdtohmyq3930-69-54 12:19:02





             Test Item    Value        Reference Range Interpretation Comments

 

             WBC (test code =              See_Comment                [Automated



             6690-2)                                             message] The sy

stem



                                                                 which generated



                                                                 this result



                                                                 transmitted



                                                                 reference range

:



                                                                 4.20 - 10.70



                                                                 10*3/?L. The



                                                                 reference range

 was



                                                                 not used to



                                                                 interpret this



                                                                 result as



                                                                 normal/abnormal

.

 

             RBC (test code =              See_Comment  L             [Automated



             249-8)                                              message] The sy

stem



                                                                 which generated



                                                                 this result



                                                                 transmitted



                                                                 reference range

:



                                                                 4.26 - 5.52



                                                                 10*6/?L. The



                                                                 reference range

 was



                                                                 not used to



                                                                 interpret this



                                                                 result as



                                                                 normal/abnormal

.

 

             HGB (test code = 7.9 g/dL     12.2-16.4    L            



             718-7)                                              

 

             HCT (test code = 24.8 %       38.4-49.3    L            



             4544-3)                                             

 

             MCV (test code = 80.0 fL      81.7-95.6    L            



             787-2)                                              

 

             MCH (test code = 25.5 pg      26.1-32.7    L            



             785-6)                                              

 

             MCHC (test code = 31.9 g/dL    31.2-35.0                 



             786-4)                                              

 

             RDW-SD (test code = 72.4 fL      38.5-51.6    H            



             62497-4)                                            

 

             RDW-CV (test code = 24.9 %       12.1-15.4    H            



             788-0)                                              

 

             PLT (test code =              See_Comment  LL            [Automated



             777-3)                                              message] The sy

stem



                                                                 which generated



                                                                 this result



                                                                 transmitted



                                                                 reference range

:



                                                                 150 - 328 10*3/

?L.



                                                                 The reference r

moose



                                                                 was not used to



                                                                 interpret this



                                                                 result as



                                                                 normal/abnormal

.

 

             MPV (test code =                                        Not Measure

d



             88202-1)                                            

 

             IPF % (test code = 6.3 %        1.2-10.7                  Platelet 

count



             [id number])                                         measured by



                                                                 fluorescence



                                                                 method.

 

             NRBC/100 WBC (test              See_Comment                [Automat

ed



             code = 7405184665)                                        message] 

The system



                                                                 which generated



                                                                 this result



                                                                 transmitted



                                                                 reference range

:



                                                                 0.0 - 10.0 /100



                                                                 WBCs. The refer

ence



                                                                 range was not u

sed



                                                                 to interpret th

is



                                                                 result as



                                                                 normal/abnormal

.

 

             NRBC x10^3 (test code <0.01        See_Comment                [Auto

mated



             = [id number])                                        message] The s

ystem



                                                                 which generated



                                                                 this result



                                                                 transmitted



                                                                 reference range

:



                                                                 10*3/?L. The



                                                                 reference range

 was



                                                                 not used to



                                                                 interpret this



                                                                 result as



                                                                 normal/abnormal

.

 

             GRAN MAT (NEUT) % 59.8 %                                 



             (test code = 770-8)                                        

 

             IMM GRAN % (test code 0.20 %                                 



             = [id number])                                        

 

             LYMPH % (test code = 21.3 %                                 



             736-9)                                              

 

             MONO % (test code = 11.2 %                                 



             5905-5)                                             

 

             EOS % (test code = 6.5 %                                  



             713-8)                                              

 

             BASO % (test code = 1.0 %                                  



             706-2)                                              

 

             GRAN MAT x10^3(ANC) 3.11 10*3/uL 1.99-6.95                 



             (test code =                                        



             4560764928)                                         

 

             IMM GRAN x10^3 (test <0.03        0.00-0.06                 



             code = 9525275306)                                        

 

             LYMPH x10^3 (test code 1.11 10*3/uL 1.09-3.23                 



             = 731-0)                                            

 

             MONO x10^3 (test code 0.58 10*3/uL 0.36-1.02                 



             = 742-7)                                            

 

             EOS x10^3 (test code = 0.34 10*3/uL 0.06-0.53                 



             711-2)                                              

 

             BASO x10^3 (test code 0.05 10*3/uL 0.01-0.09                 



             = 704-7)                                            

 

             Lab Interpretation Abnormal                               



             (test code = 60092-0)                                        



[facility] Metabolic Panel (NA, K, CL, CO2, 
GLUCOSE, BUN, CREATININE, CA)2021 11:47:29





             Test Item    Value        Reference Range Interpretation Comments

 

             NA (test code = 135 mmol/L   135-145                   



             3489892864)                                         

 

             K (test code = 3.8 mmol/L   3.5-5.0                   



             0551894383)                                         

 

             CL (test code = 106 mmol/L                       



             8122954605)                                         

 

             CO2 TOTAL (test code = 26 mmol/L    23-31                     



             6695263181)                                         

 

             AGAP (test code =              2-16                      



             9628294829)                                         

 

             BUN (test code = 9 mg/dL      7-23                      



             6906754195)                                         

 

             GLUCOSE (test code = 103 mg/dL                        



             9078773974)                                         

 

             CREATININE (test code = 0.50 mg/dL   0.60-1.25    L            



             1290137657)                                         

 

             CALCIUM (test code = 7.7 mg/dL    8.6-10.6     L            



             7345713273)                                         

 

             eGFR (test code =              mL/min/1.73m2              



             0412201083)                                         

 

             RENETTA (test code = RENETTA) Association of                           



                          Glomerular Filtration                           



                          Rate (GFR) and Staging                           



                          of Kidney Disease*                           



                          +---------------------                           



                          --+-------------------                           



                          --+-------------------                           



                          ------+| GFR                           



                          (mL/min/1.73 m2) ?|                           



                          With Kidney Damage ?|                           



                          ?Without Kidney                           



                          Damage+---------------                           



                          --------+-------------                           



                          --------+-------------                           



                          ------------+| ?>90 ?                           



                          ? ? ? ? ? ? ? ?|                           



                          ?Stage one ? ? ? ? ?|                           



                          ? Normal ? ? ? ? ? ? ?                           



                          ?+--------------------                           



                          ---+------------------                           



                          ---+------------------                           



                          -------+| ?60-89 ? ? ?                           



                          ? ? ? ? ?| ?Stage two                           



                          ? ? ? ? ?| ? Decreased                           



                          GFR ? ? ? ?                            



                          +---------------------                           



                          --+-------------------                           



                          --+-------------------                           



                          ------+| ?30-59 ? ? ?                           



                          ? ? ? ? ?| ?Stage                           



                          three ? ? ? ?| ? Stage                           



                          three ? ? ? ? ?                           



                          +---------------------                           



                          --+-------------------                           



                          --+-------------------                           



                          ------+| ?15-29 ? ? ?                           



                          ? ? ? ? ?| ?Stage four                           



                          ? ? ? ? | ? Stage four                           



                          ? ? ? ? ?                              



                          ?+--------------------                           



                          ---+------------------                           



                          ---+------------------                           



                          -------+| ?<15 (or                           



                          dialysis) ? ?| ?Stage                           



                          five ? ? ? ? | ? Stage                           



                          five ? ? ? ? ?                           



                          ?+--------------------                           



                          ---+------------------                           



                          ---+------------------                           



                          -------+ *Each stage                           



                          assumes the associated                           



                          GFR level has been in                           



                          effect for at least                           



                          three months. ?Stages                           



                          1 to 5, with or                           



                          without kidney                           



                          disease, indicate                           



                          chronic kidney                           



                          disease. Notes:                           



                          Determination of                           



                          stages one and two                           



                          (with eGFR                             



                          >59mL/min/1.73 m2)                           



                          requires estimation of                           



                          kidney damage for at                           



                          least three months as                           



                          defined by structural                           



                          or functional                           



                          abnormalities of the                           



                          kidney, manifested by                           



                          either:Pathological                           



                          abnormalities or                           



                          Markers of kidney                           



                          damage (including                           



                          abnormalities in the                           



                          composition of the                           



                          blood or urine or                           



                          abnormalities in                           



                          imaging tests).                           

 

             Lab Interpretation Abnormal                               



             (test code = 24774-6)                                        



[facility] [id number] 07:23:45





             Test Item    Value        Reference Range Interpretation Comments

 

             BF SEGS (test code = 1364428478) 4 %                               

     

 

             BF LYMPHS (test code = 9032371641) 17 %                            

       

 

             MACROPHAGE (test code = 5136945665) 69 %                           

        

 

             MESOS (test code = 1247235283) 10 %                                

   

 

             #CELS CNTD (test code = 6096376475)                                

        



[facility] [id number] 07:23:35





             Test Item    Value        Reference Range Interpretation Comments

 

             T.PROT BF (test 1109.0 mg/dL                           



             code =                                              



             4291415307)                                         

 

             UNSPUN BODY  Yellow                                 



             FLUID COLOR                                         



             (test code =                                        



             8566292365)                                         

 

             UNSPUN BODY  Slightly Cloudy                           



             FLUID CLARITY                                        



             (test code =                                        



             9323575829)                                         

 

             SPUN BODY FLUID Yellow                                 



             COLOR (test code                                        



             = 5727688621)                                        

 

             SPUN BODY FLUID Clear                                  



             CLARITY (test                                        



             code =                                              



             7791564250)                                         

 

             Sediment (test                                        The sediment



             code =                                              volume is <0.1



             0377946280)                                         mLs of the



                                                                 total fluid



                                                                 volume of 3mls



                                                                 and its color



                                                                 is red.

 

             RENETTA (test code = Test developed and                           



             RENETTA)         characteristics                           



                          determined by [facility]                           



                          Laboratory Services.                           



[facility] Hospital[facility] DIRECT [id number] 07:19:43





             Test Item    Value        Reference Range Interpretation Comments

 

             BF COLOR (test Light Yellow                           



             code =                                              



             3213775206)                                         

 

             TURBIDITY (test Slightly Turbid                           



             code =                                              



             2024603914)                                         

 

             BF WBC Count              See_Comment                [Automated



             (test code =                                        message] The



             3841231099)                                         system which



                                                                 generated this



                                                                 result



                                                                 transmitted



                                                                 reference range

:



                                                                 /?L. The



                                                                 reference range



                                                                 was not used to



                                                                 interpret this



                                                                 result as



                                                                 normal/abnormal

.

 

             BF RBC Count <3000        See_Comment                [Automated



             (test code =                                        message] The



             7922139992)                                         system which



                                                                 generated this



                                                                 result



                                                                 transmitted



                                                                 reference range

:



                                                                 /?L. The



                                                                 reference range



                                                                 was not used to



                                                                 interpret this



                                                                 result as



                                                                 normal/abnormal

.

 

             RENETTA (test code = The reference range                           



             RENETTA)         and other method                           



                          performance                            



                          specifications have                           



                          not been established                           



                          for this body fluid.                           



                          ?The test results                           



                          must be integrated                           



                          into the clinical                           



                          context for                            



                          interpretation.                           



[facility] NOW [facility]ID TEST[facility]2021 
21:16:02





             Test Item    Value        Reference Range Interpretation Comments

 

             SARS-CoV-2 Rapid ID NOW Not Detected Not Detected              



             (test code = 16837-2)                                        

 

             RENETTA (test code = RENETTA) ID NOW COVID-19 Assay                        

   



                          is an isothermal                           



                          nucleic acid                           



                          amplification test                           



                          intended for the                           



                          qualitative detection                           



                          of nucleic acid from                           



                          SARS-CoV-2 viral RNA                           



                          in nasopharyngeal (NP)                           



                          specimens. It is used                           



                          under Emergency Use                           



                          Authorization (EUA) by                           



                          FDA. The limit of                           



                          detection (LOD) of the                           



                          assay is 125 Genome                           



                          Equivalents/mL. A                           



                          positive result is                           



                          indicative of the                           



                          presence of SARS-CoV-2                           



                          RNA. ?Clinical                           



                          correlation with                           



                          patient history and                           



                          other diagnostic                           



                          information is                           



                          necessary to determine                           



                          patient infection                           



                          status. A negative                           



                          (Not Detected) result                           



                          does not preclude                           



                          SARS-CoV-2 infection.                           



                          In patients with                           



                          clinical symptoms and                           



                          other tests that are                           



                          consistent with                           



                          SARS-CoV-2 infection,                           



                          negative results                           



                          should be treated as                           



                          presumptive negative                           



                          and a new specimen                           



                          should be tested with                           



                          alternative PCR                           



                          molecular test.                           



                          Invalid: Please                           



                          collect a new specimen                           



                          for repeat patient                           



                          testing if clinically                           



                          indicated.                             

 

             Lab Interpretation Normal                                 



             (test code = 59854-7)                                        



[facility] HospitalCT [facility]BD[facility]EN PELVIS W [id number] 
18:50:54 Cirrhotic liver morphology with evidence of portal hypertension 
withmoderate volume ascites, large esophageal varices and splenomegalyunchanged 
compared to prior. Since 2021, slight decreased now small left pleural 
effusion. Preliminary Report Dictated by Resident: Amy Bezold I, Maryamnaz ?Falamaki, MD., have reviewed this study and agree with theabove report.EXAM: CT
ABDOMEN AND PELVIS WITH CONTRAST HISTORY: 54 years-old Male presenting with 
history of cirrhosis andworsening abdominal distension COMPARISON: 2021 
TECHNIQUE AND FINDINGS: Contiguous axial imaging from the level of the lungbases
through the proximal thighs was performed after the administration ofintravenous
contrast. Coronal and sagittal reconstructions were obtained. Auto mA and/or 
iterative reconstruction were used to reduce radiation dose. FINDINGS: LOWER 
THORAX: Small sized left pleural effusion, decreased compared toprior. Minimal 
loculated fluid is also seen tracking in the right minorfissure. The lungs bases
are clear. Mild cardiomegalyThere is nopericardial effusion. LIVER: Nodular 
liver contour, this partial generated enlargement of lateralsegment of the left 
lobe and caudate lobe. No focal hepatic lesions seen onthis single, portal 
venous phase. The portal veins are patent. GALLBLADDER AND BILIARY TREE: No bi
liary ductal dilation. Mild gallbladderwall thickening appears stable and likely
secondary to chronic liverdisease. SPLEEN: The spleen is enlarged, measuring 
approximately 14.8 cm. PANCREAS: No ductal dilation or masses. ADRENAL GLANDS: 
No adrenal nodules. KIDNEYS: No hydronephrosis, stones, or masses. GI TRACT: 
Diffuse submucosal wall thickening seen throughout the small andlarge bowel 
likely secondary to chronic portal hypertension. No abnormallydilated bowel. 
Normal-appearing appendix. PERITONEUMAND RETROPERITONEUM: Moderate to large 
volume ascites. Noextraluminal free air. LYMPH NODES: No lymphadenopathy. 
PELVIS/BLADDER: Unremarkable urinary bladder. VESSELS: Similar appearance of 
enlarged esophageal varices seen extendingalong the lower esophagus. BONES AND 
SOFT TISSUES: Diffuse body wall edema. No suspicious lytic orsclerotic bony 
lesions. [facility], Radiant Results Inft User - 2021 1:52PM CDTFormatting of 
this note might be different from the original.EXAM: CT ABDOMEN AND PELVIS WITH 
CONTRASTHISTORY: 54 years-old Male presenting with history of cirrhosis 
andworsening abdominal distension COMPARISON: 2021TECHNIQUE AND FINDINGS: 
Contiguous axial imaging from the level of the lungbases through the proximal 
thighs was performed after the administration ofintravenous contrast. Coronal 
and sagittal reconstructions were obtained. Auto mA and/or iterative 
reconstruction were used to reduce radiation dose.FINDINGS:LOWER THORAX: Small 
sized left pleural effusion, decreased compared toprior. Minimal loculated fluid
is also seen tracking in the right minorfissure. The lungs bases are clear. Mild
cardiomegalyThere is nopericardial effusion.LIVER: Nodular liver contour, this 
partial generated enlargement of lateralsegment of the left lobe and caudate 
lobe. No focal hepatic lesions seen onthis single, portal venous phase. The 
portal veins are patent.GALLBLADDER AND BILIARY TREE: No biliary ductal 
dilation. Mild gallbladderwall thickening appears stable and likely secondary to
chronic liverdisease.SPLEEN: The spleen is enlarged, measuring approximately 
14.8 cm.PANCREAS: No ductal dilation or masses.ADRENAL GLANDS: No adrenal 
nodules.KIDNEYS: No hydronephrosis, stones, or masses.GI TRACT: Diffuse 
submucosal wall thickening seen throughout the small andlarge bowel likely 
secondary to chronic portal hypertension. No abnormallydilated bowel. Normal-
appearing appendix. PERITONEUM AND RETROPERITONEUM: Moderate to large volume 
ascites. Noextraluminal free air.LYMPH NODES: No lymphadenopathy.PELVIS/BLADDER:
Unremarkable urinary bladder.VESSELS: Similar appearance of enlarged esophageal 
varices seen extendingalong the lower esophagus.BONES AND SOFT TISSUES: Diffuse 
body wall edema. No suspicious lytic orsclerotic bony 
lesions.IMPRESSIONCirrhotic liver morphology with evidence of portal hy
pertension withmoderate volume ascites, large esophageal varices and 
splenomegalyunchanged compared to prior.Since 2021, slight decreased now 
small left pleural effusion.Preliminary Report Dictatedby Resident: Amy BezoldI,
Renee Tang MD., have reviewed this study and agree with laura medellin.[facility] [id number] 16:58:45





             Test Item    Value        Reference Range Interpretation Comments

 

             WBC (test code =              See_Comment                [Automated



             4390-2)                                             message] The sy

stem



                                                                 which generated



                                                                 this result



                                                                 transmitted



                                                                 reference range

:



                                                                 4.20 - 10.70



                                                                 10*3/?L. The



                                                                 reference range

 was



                                                                 not used to



                                                                 interpret this



                                                                 result as



                                                                 normal/abnormal

.

 

             RBC (test code =              See_Comment  L             [Automated



             639-8)                                              message] The sy

stem



                                                                 which generated



                                                                 this result



                                                                 transmitted



                                                                 reference range

:



                                                                 4.26 - 5.52



                                                                 10*6/?L. The



                                                                 reference range

 was



                                                                 not used to



                                                                 interpret this



                                                                 result as



                                                                 normal/abnormal

.

 

             HGB (test code = 9.1 g/dL     12.2-16.4    L            



             718-7)                                              

 

             HCT (test code = 28.1 %       38.4-49.3    L            



             4544-3)                                             

 

             MCV (test code = 79.2 fL      81.7-95.6    L            



             787-2)                                              

 

             MCH (test code = 25.6 pg      26.1-32.7    L            



             785-6)                                              

 

             MCHC (test code = 32.4 g/dL    31.2-35.0                 



             786-4)                                              

 

             RDW-SD (test code = 70.4 fL      38.5-51.6    H            



             13674-2)                                            

 

             RDW-CV (test code = 24.6 %       12.1-15.4    H            



             788-0)                                              

 

             PLT (test code =              See_Comment  LL            [Automated



             777-3)                                              message] The sy

stem



                                                                 which generated



                                                                 this result



                                                                 transmitted



                                                                 reference range

:



                                                                 150 - 328 10*3/

?L.



                                                                 The reference r

moose



                                                                 was not used to



                                                                 interpret this



                                                                 result as



                                                                 normal/abnormal

.

 

             MPV (test code =                                        Not Measure

d



             61388-3)                                            

 

             IPF % (test code = 7.9 %        1.2-10.7                  Platelet 

count



             0624299212)                                         measured by



                                                                 fluorescence



                                                                 method.

 

             NRBC/100 WBC (test              See_Comment                [Automat

ed



             code = 8670450889)                                        message] 

The system



                                                                 which generated



                                                                 this result



                                                                 transmitted



                                                                 reference range

:



                                                                 0.0 - 10.0 /100



                                                                 WBCs. The refer

ence



                                                                 range was not u

sed



                                                                 to interpret th

is



                                                                 result as



                                                                 normal/abnormal

.

 

             NRBC x10^3 (test code <0.01        See_Comment                [Auto

mated



             = 5564801768)                                        message] The s

ystem



                                                                 which generated



                                                                 this result



                                                                 transmitted



                                                                 reference range

:



                                                                 10*3/?L. The



                                                                 reference range

 was



                                                                 not used to



                                                                 interpret this



                                                                 result as



                                                                 normal/abnormal

.

 

             GRAN MAT (NEUT) % 70.6 %                                 



             (test code = 770-8)                                        

 

             IMM GRAN % (test code 0.20 %                                 



             = 1657142596)                                        

 

             LYMPH % (test code = 15.4 %                                 



             736-9)                                              

 

             MONO % (test code = 8.6 %                                  



             5905-5)                                             

 

             EOS % (test code = 4.4 %                                  



             713-8)                                              

 

             BASO % (test code = 0.8 %                                  



             706-2)                                              

 

             GRAN MAT x10^3(ANC) 4.17 10*3/uL 1.99-6.95                 



             (test code =                                        



             3884797173)                                         

 

             IMM GRAN x10^3 (test <0.03        0.00-0.06                 



             code = 4965021053)                                        

 

             LYMPH x10^3 (test code 0.91 10*3/uL 1.09-3.23    L            



             = 731-0)                                            

 

             MONO x10^3 (test code 0.51 10*3/uL 0.36-1.02                 



             = 742-7)                                            

 

             EOS x10^3 (test code = 0.26 10*3/uL 0.06-0.53                 



             711-2)                                              

 

             BASO x10^3 (test code 0.05 10*3/uL 0.01-0.09                 



             = 704-7)                                            

 

             TARGET CELLS (test 2+           See_Comment  A             [Automat

ed



             code = 90191-7)                                        message] The

 system



                                                                 which generated



                                                                 this result



                                                                 transmitted



                                                                 reference range

:



                                                                 (none). The



                                                                 reference range

 was



                                                                 not used to



                                                                 interpret this



                                                                 result as



                                                                 normal/abnormal

.

 

             Lab Interpretation Abnormal                               



             (test code = 30837-3)                                        



[id number]40:47





             Test Item    Value        Reference Range Interpretation Comments

 

             PROTIME PATIENT (test              See_Comment  H             [Auto

mated message]



             code = 5964-2)                                        The system wh

ich



                                                                 generated this 

result



                                                                 transmitted ref

erence



                                                                 range: 10.1 - 1

2.6



                                                                 Seconds. The



                                                                 reference range

 was



                                                                 not used to int

erpret



                                                                 this result as



                                                                 normal/abnormal

.

 

             INR (test code = 6301-6)                                        Nor

mal INR <1.1;



                                                                 Warfarin Therap

eutic



                                                                 range 2.0 to 3.

0 or



                                                                 2.5 to 3.5, dep

ending



                                                                 upon the indica

tions.

 

             Lab Interpretation (test Abnormal                               



             code = 79860-1)                                        



[facility] (ALB, T.PRO, BILI T, 
BU/BC, ALT, AST, ALK PHOS)2021 16:36:51





             Test Item    Value        Reference Range Interpretation Comments

 

             TOTAL BILI (test code = 0045311633) 4.4 mg/dL    0.1-1.1      H    

        

 

             BILI UNCON (test code = 3336283900) 2.7 mg/dL    0.1-1.1      H    

        

 

             BILI CONJ (test code = 9267820652) 0.1 mg/dL    0.0-0.3            

       

 

             T PROTEIN (test code = 4942720804) 7.1 g/dL     6.3-8.2            

       

 

             ALBUMIN (test code = 4885384453) 2.9 g/dL     3.5-5.0      L       

     

 

             ALK PHOS (test code = 4645523243) 204 U/L             H      

      

 

             ALTv (test code = 1742-6) 23 U/L       5-50                      

 

             AST(SGOT) (test code = 5672713890) 51 U/L       13-40        H     

       

 

             Lab Interpretation (test code = Abnormal                           

    



             27755-9)                                            



[facility] Metabolic Panel (NA, K, CL, CO2, 
GLUCOSE, BUN, CREATININE, CA)2021 16:36:50





             Test Item    Value        Reference Range Interpretation Comments

 

             NA (test code = 136 mmol/L   135-145                   



             4261652807)                                         

 

             K (test code = 3.1 mmol/L   3.5-5.0      L            



             0464375258)                                         

 

             CL (test code = 101 mmol/L                       



             2350907720)                                         

 

             CO2 TOTAL (test code = 26 mmol/L    23-31                     



             7676526358)                                         

 

             AGAP (test code =              2-16                      



             8010225365)                                         

 

             BUN (test code = 9 mg/dL      7-23                      



             4953774407)                                         

 

             GLUCOSE (test code = 118 mg/dL           H            



             8156481564)                                         

 

             CREATININE (test code = 0.56 mg/dL   0.60-1.25    L            



             3440245880)                                         

 

             CALCIUM (test code = 8.2 mg/dL    8.6-10.6     L            



             9645989006)                                         

 

             eGFR (test code =              mL/min/1.73m2              



             7691974519)                                         

 

             RENETTA (test code = RENETTA) Association of                           



                          Glomerular Filtration                           



                          Rate (GFR) and Staging                           



                          of Kidney Disease*                           



                          +---------------------                           



                          --+-------------------                           



                          --+-------------------                           



                          ------+| GFR                           



                          (mL/min/1.73 m2) ?|                           



                          With Kidney Damage ?|                           



                          ?Without Kidney                           



                          Damage+---------------                           



                          --------+-------------                           



                          --------+-------------                           



                          ------------+| ?>90 ?                           



                          ? ? ? ? ? ? ? ?|                           



                          ?Stage one ? ? ? ? ?|                           



                          ? Normal ? ? ? ? ? ? ?                           



                          ?+--------------------                           



                          ---+------------------                           



                          ---+------------------                           



                          -------+| ?60-89 ? ? ?                           



                          ? ? ? ? ?| ?Stage two                           



                          ? ? ? ? ?| ? Decreased                           



                          GFR ? ? ? ?                            



                          +---------------------                           



                          --+-------------------                           



                          --+-------------------                           



                          ------+| ?30-59 ? ? ?                           



                          ? ? ? ? ?| ?Stage                           



                          three ? ? ? ?| ? Stage                           



                          three ? ? ? ? ?                           



                          +---------------------                           



                          --+-------------------                           



                          --+-------------------                           



                          ------+| ?15-29 ? ? ?                           



                          ? ? ? ? ?| ?Stage four                           



                          ? ? ? ? | ? Stage four                           



                          ? ? ? ? ?                              



                          ?+--------------------                           



                          ---+------------------                           



                          ---+------------------                           



                          -------+| ?<15 (or                           



                          dialysis) ? ?| ?Stage                           



                          five ? ? ? ? | ? Stage                           



                          five ? ? ? ? ?                           



                          ?+--------------------                           



                          ---+------------------                           



                          ---+------------------                           



                          -------+ *Each stage                           



                          assumes the associated                           



                          GFR level has been in                           



                          effect for at least                           



                          three months. ?Stages                           



                          1 to 5, with or                           



                          without kidney                           



                          disease, indicate                           



                          chronic kidney                           



                          disease. Notes:                           



                          Determination of                           



                          stages one and two                           



                          (with eGFR                             



                          >59mL/min/1.73 m2)                           



                          requires estimation of                           



                          kidney damage for at                           



                          least three months as                           



                          defined by structural                           



                          or functional                           



                          abnormalities of the                           



                          kidney, manifested by                           



                          either:Pathological                           



                          abnormalities or                           



                          Markers of kidney                           



                          damage (including                           



                          abnormalities in the                           



                          composition of the                           



                          blood or urine or                           



                          abnormalities in                           



                          imaging tests).                           

 

             Lab Interpretation Abnormal                               



             (test code = 69470-8)                                        



[id number] 00:44:28





             Test Item    Value        Reference Range Interpretation Comments

 

             BF SEGS (test code = 4 %                                    



             6112643377)                                         

 

             BF LYMPHS (test code 24 %                                   



             = 7864597921)                                        

 

             MACROPHAGE (test 61 %                                   



             code = 9035563920)                                        

 

             MESOS (test code = 11 %                                   



             7905079053)                                         

 

             #CELS CNTD (test                                        



             code = 6134846815)                                        

 

             RENETTA (test code = Reviewed by HAILEE JACKSON)         DALE IBRAHIM, Director of                           



                          HEMATOPATHOLOGY                           



[facility]2021-06-10 00:06:43





             Test Item    Value        Reference Range Interpretation Comments

 

             F-ACTIN (SMOOTH              See_Comment                If F-Actin 

(Smooth Muscle)



             MUSCLE) AB,                                         Antibody, IgG i

s negative,



             IGG(BEAKER) (test                                        the Smooth

 Muscle Antibody



             code = 06778-3)                                        titer by IFA

 is not



                                                                 performed.REFER

ENCE



                                                                 INTERVAL: F-Act

in (Smooth



                                                                 Muscle) Antibod

y, IgG by ?



                                                                 ? ? ? ? ? ? ? ?

 ?MARY LOU ?19



                                                                 Units or less .

......



                                                                 Negative ?20 - 

30 Units



                                                                 .......... Weak



                                                                 Positive-Sugges

t repeat ? ?



                                                                 ? ? ? ? ? ? ? ?

 ? ? ?



                                                                 testing in two 

to three



                                                                 weeks ? ? ? ? ?

 ? ? ? ? ? ?



                                                                 ? ? with fresh 

specimen.



                                                                 ?31 Units or gr

eater.....



                                                                 Positive-Sugges

tive of ? ?



                                                                 ? ? ? ? ? ? ? ?

 ? ? ?



                                                                 autoimmune hepa

titis type 1



                                                                 ? ? ? ? ? ? ? ?

 ? ? ? ? ?



                                                                 or chronic acti

ve



                                                                 hepatitis. F-ac

tin IgG



                                                                 antibodies have

 been shown



                                                                 to have increas

ed



                                                                 sensitivity for

 autoimmune



                                                                 hepatitis (AIH)

 but lower



                                                                 specificity suzette

n smooth



                                                                 muscle antibodi

es (SMA).



                                                                 F-actin IgG ant

ibodies can



                                                                 also be seen in



                                                                 SMA-negative di

sease



                                                                 controls (non-A

IH),



                                                                 especially in p

atients with



                                                                 primary biliary

 cirrhosis



                                                                 and chronic hep

atitis C



                                                                 infections. David

e patients



                                                                 with AIH may be



                                                                 SMA-positive bu

t negative



                                                                 for F-actin IgG

. Consider



                                                                 testing for SMA

 by IFA if



                                                                 suspicion for A

IH is



                                                                 strong.Performe

d By: [id number]

 [facility] [place], [place]



                                                                 [id number]

 Director:



                                                                 Margo Carlisle MD



                                                                 [Automated mess

age] The



                                                                 system which ge

nerated this



                                                                 result transmit

michelle



                                                                 reference range

: 0 - 19



                                                                 Units. The refe

rence range



                                                                 was not used to

 interpret



                                                                 this result as



                                                                 normal/abnormal

.



[facility]:06:42





             Test Item    Value        Reference Range Interpretation Comments

 

             AMA (test code =              See_Comment               REFERENCE I

NTERVAL:



             14251-3)                                            Mitochondrial (

M2) Antibody,



                                                                 IgG ? ?20.0 Uni

ts or less



                                                                 ......... Negat

david ?20.1 -



                                                                 24.9 Units.....

......



                                                                 Equivocal ?25.0

 Units or



                                                                 greater....... 

Positive



                                                                 Anti-mitochondr

ial



                                                                 antibodies (AMA

) are thought



                                                                 to be present i

n 90-95% of



                                                                 patients with p

rimary



                                                                 biliary cholang

itis (PBC).



                                                                 However, the fr

equency of



                                                                 detected antibo

dies may be



                                                                 cohort or assay

 dependent,



                                                                 as lower sensit

ivities have



                                                                 been reported. 

Not all PBC



                                                                 patients are po

sitive for



                                                                 AMA; some patie

nts may be



                                                                 positive for SP

100 and/or



                                                                  antibodie

s. A negative



                                                                 result does not

 rule out



                                                                 PBC.Performed B

y: [employer] [place] [address], [place]



                                                                 [id number]

 Director:



                                                                 Margo Carlisle MD



                                                                 [Automated mess

age] The



                                                                 system which ge

nerated this



                                                                 result transmit

michelle reference



                                                                 range: 0.0 - 24

.9 Units. The



                                                                 reference range

 was not used



                                                                 to interpret th

is result as



                                                                 normal/abnormal

.



[facility] Hospital[facility]OMIN[facility] [id number] 20:17:32





             Test Item    Value        Reference Range Interpretation Comments

 

             Case Report (test code = Non-Gynecologic                           



             [id number])  Cytology ? ? ? ? ? ?                           



                          ? ? ? ? ? ? ?Case:                           



                          [id number] ? ? ? ? ?                           



                          ? ? ? ? ? ? ? ? ? ?                           



                          ?Authorizing                           



                          Provider: ?Amy Caldera MD ?                           



                          ? Collected: ? ? ? ?                           



                          ? 2021 1300 ? ?                           



                          ? ? ? ?Ordering                           



                          Location: ? ? [facility]                           



                          Health ? ? ? ? ? ? ?                           



                          ?Received: ? ? ? ? ?                           



                          ?2021 1321 ? ?                           



                          ? ? ? ? ? ? ? ? ? ? ?                           



                          ? ? ? ?                                



                          Medicine/Surgery [facility]                           



                          7B ? ? ? ? ? ? ? ? ?                           



                          ? ? ? ? ? ? ? ? ? ? ?                           



                          ? ? ? ? ? ?Specimen:                           



                          ? ?ASCITES ? ? ? ? ?                           



                          ? ? ? ? ? ? ? ? ? ? ?                           



                          ? ? ? ? ? ? ? ? ? ? ?                           



                          ? ? ? ? ? ? ? ? ? ? ?                           



                          ?                                      

 

             Final Diagnosis (test d8seoNUrCWStz6wcPFAwd                        

   



             code = [id number]) GFuZzEwMzNcZnRuYmpcdW                           



                          MxIHtccnRmMVxlcGljOTQ                           



                          aO5mcxzAwLCYbkZUjC3Ri                           



                          ecoiEYwyGC8qQA9vsDcuz                           



                          SOddHKjNKOlVeUra4dzb3                           



                          01hCLqr7jyKRKUibogiJt                           



                          0hDblM85pk1W7ZqwgB18j                           



                          xBJuOWO2RZPmIPZriCQgB                           



                          HKlHPC5OCMrmKDgR2nuEF                           



                          HdUI2ygtftFMqvXUmwVIK                           



                          reBT7UOFyqVWyR7TkQIZt                           



                          MViyYPAqcnp5GrUsIq2de                           



                          GVyeTcyMFxwYXJkXHBsYW                           



                          vsZRQiVhXfIbZBZG3hZKR                           



                          CQN6SEI75HCOLUmSFQG8I                           



                          RVNJUyBGTFVJRDpccGFyI                           



                          U6iAcNTMDXMYrRkQw5RKE                           



                          1BTElHTkFOVCBDRUxMUyA                           



                          eV5KOCVWXJA9FSsSrIJZz                           



                          ng38YIY0ZhKmx1K6WKUoQ                           



                          rUgHVWrCI0blBtrALTlVO                           



                          7yZONdL1omsA4xake4TtT                           



                          gQILbMaF0FMEhtcI6Jct5                           



                          XZJhGZhlp7env7VrX4Vbu                           



                          NBeaUt1c5xfLLErHvZ7xP                           



                          FnNCraQ5xmotNqcOVlXSH                           



                          wRZx4lEpnDnMvTFHza6qk                           



                          cyBcZmNoYXJzZXQwIENhb                           



                          Ggesvq3jJ79GKIieZ4ohA                           



                          VsOZjcnoFvXjK3TNxcZKJ                           



                          hAnB2KFGdbVSdOVVtI4ii                           



                          ZWQwXGdyZWVuMFxibHVlM                           



                          IZ4rJotf9L1kGRipTQdkT                           



                          djLhCpOnQyCLFSn6AfCIh                           



                          1aMyaZ1GnEVBlCqE3zGQd                           



                          XMGbBEfkGVYlKARouaV4a                           



                          Z36APgolfH7gWAcs4Wfg6                           



                          7to483mB0anSFlNDA8JGM                           



                          uGDAcpMRfJCJwRAY9CVId                           



                          yZPcG6moVKNgZD0jmeptV                           



                          HxpDSfiJNZuqHF2WMFygZ                           



                          FaQ5FwLQMnWYnfMGBqlcm                           



                          6VoJuYl7uhTSniTmgRAmd                           



                          x7caw0qmvGSySyx0UGEkY                           



                          mPnKlkzUMbah7Yxx8zyFH                           



                          Ohnj3qREB1qHZsyMhjo0R                           



                          0bGUxXGRudGJsbnNiZGJc                           



                          NkD7PMbqMR2jyp01MODxT                           



                          YM5cb5weLNhpZwceiAlmS                           



                          MyVGikQ5ObJVLmo233ZTW                           



                          wI0YhGNMbs3J4sbHhPtKd                           



                          EEEsbAJ2itV1WMCcASg5x                           



                          ZQldjV6ipQxjKCvV6qmxV                           



                          7qAAUiCY2ymsthr0keQLv                           



                          sCUvzIUSzrDD9tjG4SXKq                           



                          uPCxQ8OyqH6wDCVeGAxhX                           



                          TBdgyr0CpKxGo8ndVTidB                           



                          cyMFxzYmtwYWdlXHBnbmN                           



                          vbnRccGduZGVjXHBsYWlu                           



                          XHBsYWluXGYwXGZzMjRcc                           



                          FezxUriwT9lIbRpGnEbUJ                           



                          zaHG8lMGYsB8xlrUDlYMS                           



                          rYNPnV4faDsBspQ2qbPih                           



                          MVxjZjJcZnMyMFxwYXIgS                           



                          SBoYXZlIHBlcnNvbmFsbH                           



                          vgzeE5hLS4UGIiPDxmCMJ                           



                          xSKVbcLMnre0lfXuxIXUv                           



                          VZ9vEUQorgLvRLassLggX                           



                          PkuILG0YCAuwDVwpTEvxN                           



                          FkZSBieSByZXNpZGVudHM                           



                          xXFAeoHdgi1Sxl7WbtPF9                           



                          jE8ea5ogc0LgVMXfdCO2X                           



                          N38wvJ6bU7kXMHcJE2gHJ                           



                          UmUV4dxRAvpPQePCOhj74                           



                          gdGhpcyByZXBvcnQuXHBs                           



                          YWluXGYyXGZzMjhcbGFuZ                           



                          zEwMzNcaGljaFxmMlxkYm                           



                          KvXMAnWWycM0stPlVvAdR                           



                          gDZcaQFD5hN==                           

 

             Final Diagnosis Comment u2ajpYMeHYZriEB1KYQkY                      

     



             (test code = [id number]) WQeg5bey2MpsLYilGHnEP                     

      



                          fqbZSypuPxvr28xHG3nM2                           



                          2CM0xDVVoDtJ8NEOlomG5                           



                          Wqv4YLNhVXNvxJNdQ696x                           



                          2diu9voavSmiZW1iBqcPT                           



                          OdfvfvFcX4RSozRPDlmbj                           



                          jMYm3TSxqDTVlzQA3LFQb                           



                          sCCcT1ZbUMZgJG8fqva0K                           



                          MC0RSdqVOLfNcR2GFYtmY                           



                          DjSIYdnDzkZTnkh823TBC                           



                          8MrQqXBXuujYxuKvweA4i                           



                          TmUkZVIZkLIuxnHad5wnn                           



                          lIeJ3X2mFYsGUTmiQBfg9                           



                          DzRSccLKjeR0SxkBVinM5                           



                          wAVPrUSKaU8AapA5kLA5g                           



                          HKQqVINeySnjJZ67qMcxd                           



                          KkhmAzjkIavwGldQ4p9uW                           



                          IdrB8zwBUhpGJ0nK1xIvO                           



                          GfpRtVGdgS16peoTxK2Yx                           



                          wYMxvXSltsRlDngyLM3yc                           



                          GFyfQ==                                

 

             Clinical Information large new onset                           



             (test code = [id number]) ascites                                

 

             Gross Description (test i0neyTDoAIWzbDS3KDRbY                      

     



             code = [id number]) FFcl2cns3LdyAGowFQuSH                           



                          xyzGFlkvPqpn91jPY2xE9                           



                          2NB4eYZBkXaW1FHRyfoB2                           



                          Pkc0NUBnIYRnwLYeQ451f                           



                          7xex5ppjxMldJQ2cThiMY                           



                          QxivkzErO1RWxpQPBzzij                           



                          yTPp2XLptUKLakQS5HGCi                           



                          eYGmR5WtCWQlOF2qtkm7X                           



                          DA8LSbsQYXzPcD4OCIjcW                           



                          CcJOFuzPtwDZjxa139BOF                           



                          6FlBhRWAefnO6OLogDQNz                           



                          X9JyY9NqCIilJBY1DKLsN                           



                          CBcXHQgMSBcXGZsIFxcbm                           



                          U7o2aqPQLmmCByCEZ4OJz                           



                          caWQgNTEwMDIgXFxkYiBP                           



                          FnXuGvSYAHQpIZt3LfZ1H                           



                          Vo6CQLCBrOmFjIwLOA2IO                           



                          e3HiYjZKy4ADo5KAqJGoF                           



                          qRsX1WjGpVmH9APP0StL9                           



                          IFxcdCAyIFxcZmwgXFxmI                           



                          EFyaWFsIFxcZnMgMTAgXF                           



                          qyC20acPhuwA3aGkErWAg                           



                          tQPRvJrBtXpYGYJ1zMGNN                           



                          TA6LDW90IKMVWqCBZT1XU                           



                          VNJUyBGTFVJRFxwYXIgUm                           



                          EwXRy8FUMzNuJyq8emjKU                           



                          rJXSmZUHoL6Qxj2Zje6Qi                           



                          bmdlIGFtYmVyIGZsdWlkI                           



                          FxwYXIgUHJlcGFyZWQgMy                           



                          BzbGlkZXMgKDEgUGFwYW5                           



                          hQ89jFE26OHK3eL4jyBax                           



                          DHXzMLEpzLXau9anw1xdE                           



                          9n1o9TbuU8yGB1jPDRnCQ                           



                          hpbnByZXAgcHJlcGFyYXR                           



                          kq71sLENsfvbnYPAbC2Li                           



                          W4AibrX7a1xgyNuzy3Vfl                           



                          BBcYX2zmIUtwS==                           

 

             Disclaimer (test code = [id number]                      

     



             [id number])  GFuZzEwMzNcZnRuYmpcdW                           



                          PbZLshflWdXGzxj4FhP4C                           



                          yMjAwMFxhbnNpXGRlZmxh                           



                          tglkPZJxWYT5blXvZKHoX                           



                          LsyTQXvKUvtBl0srCDraH                           



                          jtQpFqYZLxl3ogrpRAQZj                           



                          cMjHpB336SJHnMZzhp8ch                           



                          j5FoCYBqiYPoe1W3GAFOo                           



                          fyvcCr3fHzbK61xv6H9Sp                           



                          njY3gqTTZdLLLjN0MyRP6                           



                          nHLIqHxh6DCD4HXX7QJEr                           



                          TNHmN1BzAQ4hRNFodADfL                           



                          Xg7j6xfbLkzGWYtCCZ0f6                           



                          mvFJzevdNcLL4osl4coKz                           



                          5e8ahrpCkZUQiUEPniKIQ                           



                          SDHkS3CtaWavGv3tgXe0j                           



                          NtyHkvyDLP0Mdp5JV2udm                           



                          95oac4bZxdOBItlplbGbU                           



                          9KMuwBSEiauepZEz3OBeu                           



                          TBEwgAI8LTKboNJkW8QrV                           



                          QQpCE1kbsb9LLO7MGwiWU                           



                          LoWkR6ISMxtFSpCHBupBv                           



                          wYNehm966IJS4HcHdLK9i                           



                          I7Uka3Q6dD6dkHFzNLFoe                           



                          OUdLwMbAFMzpl7wsWPkGY                           



                          qxm1XgWZO2etT9aEQefLM                           



                          sTCMsMF70Wtjix3FnTkir                           



                          c0GtP35dcAM0MZont1snU                           



                          F5pRiN5jjJcNMmpb2kzlU                           



                          7rJlH7AEhjCI0kRV4uNKV                           



                          wpH1djeuoZSJjJbHyipnb                           



                          THNbqGgdfcGvCf5uhVkwP                           



                          QW0PLucI1pmmQ9cLeQ6RB                           



                          bpZ0tggX0rWIe3QXhgaCJ                           



                          1QSUqhY8gPR0kuqkfc2kk                           



                          LTrcPLwlTVSgoqI6bzW4U                           



                          JNrtLJmA4MixJ8qQYVmTQ                           



                          8jmfvzv0tlTUF2MGwvGTV                           



                          pLNY5VrAjCGZle0Fkppt8                           



                          WdIvr1YdjTKiCVyqW71zv                           



                          148PGXnhvOzK4opmSLxoh                           



                          lvzYRegxujDQofzyQ8SIZ                           



                          scaCct1OfAUIiCIL3NYeo                           



                          ZWdtwFZbJUIepUhva5ufE                           



                          3RscGFyXHBsYWluXGYxXG                           



                          ZzMjBcbGFuZzEwMzNcaGl                           



                          jaFxmMVxkYmNoXGYxXGxv                           



                          A1zuNnDpV5VgEMAoQnGqi                           



                          ZEkL6pdJEwvgpOuCXGmer                           



                          PvrYK6WZhtJ0h6WLIcuyJ                           



                          cgSr0pwLmYvAoCNKfXWX9                           



                          UJddpFJjVTJgg0Fgzzvwi                           



                          DSkDg7cnEGiBGVriB9sQK                           



                          JwUFDxNBydEO1tlTf7JSA                           



                          YuXXthROxLnBCSWNuSA91                           



                          uhWhHKHKummox0V3ITjgN                           



                          KFep9QevTAsY8ajt5McMQ                           



                          Fdc85cYE4pl1M4o6nvHMG                           



                          6WJ6uf1IdQOQmoHMhcIRb                           



                          SWBtu9Fnubwpg7SyFEXbp                           



                          xDby2SjKWJwmmFzfWPbAK                           



                          TsbtGelr5efyVpOVDrCCF                           



                          hB7KbjxrjsMpjujGxBNBr                           



                          nf7wrkMiYGG3KXQWUDBhW                           



                          DJfl4OmyC6xqAENEGO0oQ                           



                          Bvmj2cgnPGhYWbEDZxfv0                           



                          2FBYzBK4nG3uoXPKmWZCr                           



                          efBdmITsu1TnRSMkzOA1x                           



                          ZYxMK6VYaRAz27wLLXlYL                           



                          DXupYnPENrqPrnfVJ4tcV                           



                          1zH7xZEkFRZJaEbj+IFRo                           



                          VMREQBOiBL6mzuRas8Lij                           



                          zXsiBntBNRblGOee0JsqF                           



                          Beg4TasFfst0ClsXAkvNJ                           



                          wHB2vRNSewqyyWJVeDHFR                           



                          NuGHLFKkzyH5o9OhJUKpR                           



                          ULuBCW1nWasoul0ZYMiiE                           



                          3aVFOsM7beidqvKVuwXQN                           



                          dt0MdxZ8wlSATtPQme3Wh                           



                          lCLulRZElENgUJ3qvnRcG                           



                          DpOPAzAXRD4zyVdYFBsn8                           



                          IaVPnwJ6beI95elOhwlHk                           



                          0sRK1IQX6rQ0eYsc+IFxw                           



                          YXJccGFyIEFwcHJvcHJpY                           



                          SNwdXbyuwZlW7CuahZtqT                           



                          8jxKBvscOoGC5xPL1pM1K                           



                          5nYJmDWOiqtEhs2dcWTgz                           



                          dmUgYmVlbiByZXZpZXdlZ                           



                          AFrg9ItTWsgAPZ7INvbnn                           



                          BpbmNsdWRpbmcgSCZFLCB                           



                          NtJChlEGoVLM1VNpacgAk                           



                          ixDdTU6loJ1imJfcmJ1vb                           



                          WVyxYL8lxboFEXxBHVvkG                           



                          ooYLPrYB8dnJEqIFPkxaR                           



                          UlWmfvXIkkY3iZ6EzYWAx                           



                          ZENxan4zIZDmxV0yQVnrk                           



                          2VydmljZXMgYXJlIHBlcm                           



                          Meql8hRIWggQWLKY9YMMh                           



                          ydVYdl0EtawMpG7xJXEQ5                           



                          NUQwNjYwMjgxKSBleGNlc                           



                          NMrRRKxtl81DBSfqM5yiV                           



                          vrXUGuxE9asL2pwHpbfI3                           



                          oMaHaJfPjRQjpHO2yWTJw                           



                          K4lmhGCeCWOxUJShO9ulV                           



                          aUkjM4gwZmrUMcsBmJfNm                           



                          ZlDHwiYQG1hX==                           

 

             Embedded Images (test                                        



             code = [id number])                                        



[id number] 20:17:32





             Test Item    Value        Reference Range Interpretation Comments

 

             Case Report (test code = Non-Gynecologic                           



             [id number])  Cytology ? ? ? ? ? ?                           



                          ? ? ? ? ? ? ?Case:                           



                          [id number] ? ? ? ? ?                           



                          ? ? ? ? ? ? ? ? ? ?                           



                          ?Authorizing                           



                          Provider: ?Amy Caldera MD ?                           



                          ? Collected: ? ? ? ?                           



                          ? 2021 1300 ? ?                           



                          ? ? ? ?Ordering                           



                          Location: ? ? [facility] ? ? ? ? ? ? ?                           



                          ?Received: ? ? ? ? ?                           



                          ?2021 1321 ? ?                           



                          ? ? ? ? ? ? ? ? ? ? ?                           



                          ? ? ? ?                                



                          Medicine/Surgery [facility]                           



                          7B ? ? ? ? ? ? ? ? ?                           



                          ? ? ? ? ? ? ? ? ? ? ?                           



                          ? ? ? ? ? ?Specimen:                           



                          ? ?ASCITES ? ? ? ? ?                           



                          ? ? ? ? ? ? ? ? ? ? ?                           



                          ? ? ? ? ? ? ? ? ? ? ?                           



                          ? ? ? ? ? ? ? ? ? ? ?                           



                          ?                                      

 

             Final Diagnosis (test t8axjBAmMIRmo5duCWZrv                        

   



             code = [id number]) GFuZzEwMzNcZnRuYmpcdW                           



                          MxIHtccnRmMVxlcGljOTQ                           



                          dN3vviqTqMWFgkJRxB6Zf                           



                          flkzCQvgBA6vTP4zfBzkq                           



                          NYmmLYbYZXpBhHnz8nar5                           



                          54qGCxx3kgXUPSralslIg                           



                          5vPreY36az9G9OcarE52z                           



                          rIKtOBU9KJVaTLOxyTVoQ                           



                          VDqECA8NDTyeQNnY6bkPR                           



                          ObLS6hncwkXRpwKNtkVDK                           



                          gaNB7BPPjcVIeC7DvHXIp                           



                          XPlbUIWngms0FiHsXc6zw                           



                          GVyeTcyMFxwYXJkXHBsYW                           



                          kuJNUsMeZyTvDXVL3oWHV                           



                          SQF4ULX97ZHWXMvMSNK4O                           



                          RVNJUyBGTFVJRDpccGFyI                           



                          Z0zGoNXORELTyQuLo2QRE                           



                          1BTElHTkFOVCBDRUxMUyA                           



                          oY6BEEEDJVB4GVnFqXBQq                           



                          tm96FTS9LaQse0D0ECDoH                           



                          aNxQCSyRZ2yfIyxTICzIO                           



                          6wEVZbE5qwpD6bkiq8HqS                           



                          pFXKuLqT0BHPqvrX0Sjn8                           



                          DZYkJWxwt4yuc6AyE9Ztk                           



                          HHlmVv5p8dgHKChVhV0tP                           



                          ObZUpiH1wvyjNqjXFkRAL                           



                          cVAt4cMxmQqNlZXBun8mt                           



                          cyBcZmNoYXJzZXQwIENhb                           



                          Toeblg8qV17NGKedR1skL                           



                          LsQVpoerTzFvA1KZogFPA                           



                          yFnV4FZSolDTmESMvB4rr                           



                          ZWQwXGdyZWVuMFxibHVlM                           



                          NH6uLzky2H7kXCqtRLsxC                           



                          roJtKdYvVjOLYAz0IlBJr                           



                          0gAjhV3JoYEZfFwK8nQTm                           



                          UHBaJInqPFEpWAPbswW9t                           



                          Z97DUgqltO4yGJqd8Qog8                           



                          4cu132kQ0ucKEaUBA7WFR                           



                          fQGMvgLKgPLNhIWF6LDTv                           



                          wURuX0meVTVsRP0zloddE                           



                          KugNUpsBZMleWX3UHDhuS                           



                          RdB8CmLYClTVxyRVFonbj                           



                          1SrSkSd2nrHCggFfbSTml                           



                          r3ejl8kjsCIsMui5SRNzV                           



                          yCzLjzbCXili9Izc7lmXG                           



                          Ozyq8eDLX4tOKhzPevh9B                           



                          0bGUxXGRudGJsbnNiZGJc                           



                          UqZ3BIczWZ0qui37SCHyS                           



                          DE6du6jnMUiiTzrrvPzeU                           



                          FhMRdqK1XsFSWci469ELH                           



                          yC9NuKZUgn3V8vdJjXwSw                           



                          PTOdwQU8trK8BCVpHPn2u                           



                          JOiyjG7jnNffVHoW5clmP                           



                          9tYGSbPD8zjbtkq4mwCKo                           



                          uKLyuHNGhbKJ9yoQ6GYKd                           



                          cNKaB7XbpD5tGEWkZRqwV                           



                          XElbun4YbOhUn3rxFFboF                           



                          cyMFxzYmtwYWdlXHBnbmN                           



                          vbnRccGduZGVjXHBsYWlu                           



                          XHBsYWluXGYwXGZzMjRcc                           



                          KproXgmwF5hGiYeVgIoEJ                           



                          kmYJ0iZXElB3ujfQQoNIG                           



                          bLEYmZ1scUvRfoZ6tgWon                           



                          MVxjZjJcZnMyMFxwYXIgS                           



                          SBoYXZlIHBlcnNvbmFsbH                           



                          rqdsP8qOJ3AMAfNVquGMI                           



                          nUTMshCOloq2jhAwlFZOc                           



                          SJ5gINIuebXrLUraeLkfF                           



                          MziKXS3SZBntVOamPUxfX                           



                          FkZSBieSByZXNpZGVudHM                           



                          fPSGbaNezg3Hll1UugMJ8                           



                          fE3bd0iwt4EfZZEkkMQ5U                           



                          B71veZ2pF9lJHZeBU4bQB                           



                          CuTX1nfAUetAVoDKJoi34                           



                          gdGhpcyByZXBvcnQuXHBs                           



                          YWluXGYyXGZzMjhcbGFuZ                           



                          zEwMzNcaGljaFxmMlxkYm                           



                          FeQZSyOSygD9rfPrFmNwR                           



                          tBAzwCOF1bJ==                           

 

             Final Diagnosis Comment e7opkOAsXUPipDU5OHRqN                      

     



             (test code = [id number]) QOva8igc6UhuKXpcVPqVC                     

      



                          hqpPZewePerw11xPZ0eB2                           



                          1SG8sDCDeArO3GRWqkiW8                           



                          Tny7AWAyRGZljKUcE790m                           



                          2npa0nsrsRnfFW3dBdkBS                           



                          TqdpifDuS5IKwkDGOgajc                           



                          qOSq8TDxnVBFgpBB2JYFe                           



                          aNUyS0OaCCIhDA4liae8R                           



                          RN7XAltZJLoTiE5MZMxyJ                           



                          IrIZAipQtgSNlfg262SRT                           



                          7PrPqJPWluoFioHzqsI2k                           



                          HtHuIWURvZXlbsExw3olo                           



                          iWjN3R7fYRiUQEuxPFgc8                           



                          DvXQkbMJifW4RkvYMshF5                           



                          mTQIhCZXeO0AhdP9zLZ6i                           



                          VTSaYAEhsXnhDO51sEvsl                           



                          FlslSvkgBhovGmuP5t4nX                           



                          BptZ6jzFFggUK5wH7jMgJ                           



                          OcoPeITpwM17xwePwO8Lb                           



                          jZVurWCethCkSfygGJ0sb                           



                          GFyfQ==                                

 

             Clinical Information large new onset                           



             (test code = [id number]) ascites                                

 

             Gross Description (test s1yrqEDpJCUreXG8YPZsH                      

     



             code = [id number]) JAbg0wrh0IcxRYhrCVwLJ                           



                          worJXqbdMape46pXP7mW1                           



                          9XM1qEGXoViZ1VXWrmxY3                           



                          Eet8TNWvZLAwjHZlG298o                           



                          7ngc5slgiHvaNZ4mVfgAK                           



                          MlhwxvWlP2MHboGJOvzuy                           



                          cEVh1ELnuLOUtyIG6HUYt                           



                          oCEnD8AfSYRhKZ6yate9O                           



                          NB1HHoxKRMyKbS2SSXloP                           



                          CjREHqhQaiLQtjm939JUT                           



                          9ZuTrCJMrbsM3AXexOJYx                           



                          T5PbD0MvQGxjEAH9VULwM                           



                          CBcXHQgMSBcXGZsIFxcbm                           



                          P1n3jzHYIuzDErFFB1ZUj                           



                          caWQgNTEwMDIgXFxkYiBP                           



                          UfJyEcYFDBRxFNj7FtF8L                           



                          Cn1YECSSnShFiZzECK6LY                           



                          j8DuSeGXm9XOy2BWhUKeX                           



                          kEcU9BlHxXpV3EIF5RzU1                           



                          IFxcdCAyIFxcZmwgXFxmI                           



                          EFyaWFsIFxcZnMgMTAgXF                           



                          spP18tcIfwnR3kPnVwAYk                           



                          iJJUdToMoWdFFKB1oQTZR                           



                          VQ7UHR12MENSYjESXK8EQ                           



                          VNJUyBGTFVJRFxwYXIgUm                           



                          CzQBi7XDXaAzRba3ypvLZ                           



                          rPGNzEQMiC3Nas4Owc5Bh                           



                          bmdlIGFtYmVyIGZsdWlkI                           



                          FxwYXIgUHJlcGFyZWQgMy                           



                          BzbGlkZXMgKDEgUGFwYW5                           



                          gM67oMI70GTB5nO9ftDxc                           



                          UCGtRTXjhOOcv9sas6faY                           



                          7z6l2NqtS3fLO1nDKHoKZ                           



                          hpbnByZXAgcHJlcGFyYXR                           



                          al53uYOTjerqeHSQpJ3Rk                           



                          U1KydbG6c7vynYhod1Vrk                           



                          JGvKH8qsICkhH==                           

 

             Disclaimer (test code = [id number]                      

     



             [id number])  GFuZzEwMzNcZnRuYmpcdW                           



                          JrOEqmhlShXAwaq6HqD3H                           



                          yMjAwMFxhbnNpXGRlZmxh                           



                          etqsEPWbDFE6goAcFLRuM                           



                          WnaDTRbMNfkCu5bpCZgaZ                           



                          ebSiMkANNna2ktfmKYEEd                           



                          sHbEtK637CHLxDXuyy4hj                           



                          t9NyHLCdlHUul4I6UIJBe                           



                          nuhvKi0mVaeQ30kv3I4Gp                           



                          jxM7reCLMvQGChQ9FhJS6                           



                          fESSuZav2SOA5JQI4FOCi                           



                          RNXsB0MsZS2dVFGxpFLuZ                           



                          Cm2g4yinZplMZLzFCI4y3                           



                          kxHPaherTyWC4ute5rfZh                           



                          0j8xfhqYsJEUeSBLajZQH                           



                          ODElL2JsaQriHu2zbAh3d                           



                          KkzAdsqCFA1Qyj5MH8tqu                           



                          51yxf4wLhjNZZkoddyGtR                           



                          7DSwvCMXewthmHPy8ZWzt                           



                          GNSfwPY5KOEuaKKwG1XfS                           



                          GLbZI1umpt6ZVI4FAeiKH                           



                          NuEwF3TQWnsVWtUXYzgQd                           



                          tGMnus793QJM3YoYxZB6m                           



                          E1Ude8H1wU6llFGnCRWoq                           



                          TLiLgHfNPYzkk6aoLRtAM                           



                          nzl2MbVRO8duN3eWOulRN                           



                          sXRFwRN72Ewbeb3CfWnzw                           



                          h7DkD30bxHK7JUvpx7rfA                           



                          L4bXlC5diDcFZrxa3mljO                           



                          9iUaZ5BAwrWE1hNQ4sRSB                           



                          euE1mzyovIDUmVnWxuqlv                           



                          XKPqbKwwzsWsBj9veZaeN                           



                          AQ7LWgsC0zgyO5nFvB7KK                           



                          sgY6pzpB8eQAz8OEztwHY                           



                          8DYBydM3pEY4ywdpfx4di                           



                          FZtkISplEVOyrzF2rbJ2Q                           



                          VUuvIXhI9UkdB2jJPYqVH                           



                          5hhhdjt1xpUPF7ZUalAWG                           



                          iCVT8JyAbLQAch9Wrrlw6                           



                          OoEqt7CbwQHmHKyhH84vm                           



                          642GIGzkrMcD9wxoTQfew                           



                          cxcFFbvzwtQFxifgN1SBA                           



                          jnkYll3RaTILfDHH5HFji                           



                          GNdbjBYyYEWwoQcun8jnI                           



                          3RscGFyXHBsYWluXGYxXG                           



                          ZzMjBcbGFuZzEwMzNcaGl                           



                          jaFxmMVxkYmNoXGYxXGxv                           



                          U4twImIbG1JiXEPePlOib                           



                          JJdK5trJAxsenPnKFQlav                           



                          DzuSB8PLfpL7o6NAWtvhK                           



                          wlQk9vpUeHdJuCDTxWEB6                           



                          NMlzdFXzOFVyn3Mkunduv                           



                          NSsSn0ysLCuKEBuaY0sXT                           



                          HgVUYzSIwvFY9suJu8GDK                           



                          XzXVehDKtVaUZPLAnCH48                           



                          vyXbXEGSuxbes4K6ERarN                           



                          HWme2QsbEDiE7vse3OfBB                           



                          Cvg97hHS5em9R8w0taHQN                           



                          8PM2ip7GpDAVwpDXdyDLy                           



                          IHSzl1Jwmmuom0QlJXSzm                           



                          wLad2QvTBHludThoZGmTN                           



                          PvtkCefs0tfgZnYWFpPXY                           



                          kL7VqjhbenTfxcaFsTWRe                           



                          es6dckNmLQW1XUUOBDNgO                           



                          PZjl3VlxJ0yyRFDXZQ1nJ                           



                          Nywb6zypKTtNWwOTHaua9                           



                          8OAOiZX9lR2xvJJAcSHYw                           



                          wlGwgHGor1JeUBVonKE5v                           



                          QNbWT6HPhTHm24sODDtBL                           



                          QRosYrCUKybHblhOC0jdT                           



                          8kI0aSPeEQVEuHbi+IFRo                           



                          GIWROZMaKK4sbmSyg2Wfi                           



                          bUthLjwVYKzlDPdz0QvnV                           



                          Voe1NahFwpe4SmnEHddUF                           



                          kOP2cXRJmbbbhKAMqDHGR                           



                          VqJKPQJjocH2h5QrDYCbK                           



                          TEyJJC5dOgruan6CUTnfV                           



                          0kXDXdO0hniiyaQUpeSNR                           



                          kx8YaqR1luMEHdAVhq2Lk                           



                          uHDyjCCOoKZlPM2genAjJ                           



                          AbMSEiNRYK2kjOmCJBfs6                           



                          IsQLdyP3hfY89oeKjryJt                           



                          2cPB1UMY4eZ6oBmu+IFxw                           



                          YXJccGFyIEFwcHJvcHJpY                           



                          XNbfCuyvwAeM8YpvgWizJ                           



                          8ezICwvoMnIH0uZU6nQ0A                           



                          7lPQmBTNqkwIda9vwZDrc                           



                          dmUgYmVlbiByZXZpZXdlZ                           



                          AYrd7AgSYelQPL5IVhxlc                           



                          BpbmNsdWRpbmcgSCZFLCB                           



                          SsPHmvZVeEIQ0HSkistKq                           



                          fpSwTE2ujX1owAkozU0ml                           



                          MBdxMN9dkrxYEVnDLLvpI                           



                          cwLZDiJM4meUIyRSBzpkD                           



                          CsCkqoGQfiH3kL4XkQATs                           



                          ENWhil8cBRPrmZ6qIKjce                           



                          2VydmljZXMgYXJlIHBlcm                           



                          Ewft1vYWZpmWFQVY6CNLv                           



                          hxXShm6LxkeQdV7wCLKA4                           



                          NUQwNjYwMjgxKSBleGNlc                           



                          VZkFSClgy56RRSyeM2qcX                           



                          yyFYXrzL8vxK5slFamvA8                           



                          mXeFwMoOiGLzcIO9sHRLq                           



                          N9afwYGvFAVnSYQeW0obJ                           



                          cQlbZ0woYuqQZyoYfZjTg                           



                          RbQCawSAL7vO==                           

 

             Embedded Images (test                                        



             code = [id number])                                        



[id number] 17:43:43





             Test Item    Value        Reference Range Interpretation Comments

 

             ALBUMIN BF (test code 476.0 mg/dL                            



             = [id number])                                        

 

             RENETTA (test code = RENETTA) Result interpreted                           



                          relative to the serum                           



                          concentration. ?                           



[facility] [facility] [id number] 17:43:43





             Test Item    Value        Reference Range Interpretation Comments

 

             ALBUMIN BF (test code 476.0 mg/dL                            



             = [id number])                                        

 

             RENETTA (test code = RENETTA) Result interpreted                           



                          relative to the serum                           



                          concentration. ?                           



[id number] 
15:36:48





             Test Item    Value        Reference Range Interpretation Comments

 

             NA (test code = 132 mmol/L   135-145      L            



             5049883565)                                         

 

             K (test code = 3.0 mmol/L   3.5-5.0      L            



             7018228402)                                         

 

             CL (test code = 98 mmol/L                        



             3086245832)                                         

 

             CO2 TOTAL (test code = 32 mmol/L    23-31        H            



             2521684529)                                         

 

             AGAP (test code =              2-16                      



             2358990173)                                         

 

             BUN (test code = 5 mg/dL      7-23         L            



             5303700669)                                         

 

             GLUCOSE (test code = 156 mg/dL           H            



             8956161856)                                         

 

             CREATININE (test code = 0.48 mg/dL   0.60-1.25    L            



             2816944483)                                         

 

             TOTAL BILI (test code = 4.7 mg/dL    0.1-1.1      H            



             4426963584)                                         

 

             CALCIUM (test code = 7.6 mg/dL    8.6-10.6     L            



             2912995034)                                         

 

             T PROTEIN (test code = 6.5 g/dL     6.3-8.2                   



             8263038266)                                         

 

             ALBUMIN (test code = 2.7 g/dL     3.5-5.0      L            



             1163380911)                                         

 

             ALK PHOS (test code = 109 U/L                          



             3749697157)                                         

 

             ALTv (test code = 22 U/L       5-50                      



             1742-6)                                             

 

             AST(SGOT) (test code = 70 U/L       13-40        H            



             0485156185)                                         

 

             eGFR (test code =              mL/min/1.73m2              



             0430991166)                                         

 

             RENETTA (test code = RENETTA) Association of                           



                          Glomerular Filtration                           



                          Rate (GFR) and Staging                           



                          of Kidney Disease*                           



                          +---------------------                           



                          --+-------------------                           



                          --+-------------------                           



                          ------+| GFR                           



                          (mL/min/1.73 m2) ?|                           



                          With Kidney Damage ?|                           



                          ?Without Kidney                           



                          Damage+---------------                           



                          --------+-------------                           



                          --------+-------------                           



                          ------------+| ?>90 ?                           



                          ? ? ? ? ? ? ? ?|                           



                          ?Stage one ? ? ? ? ?|                           



                          ? Normal ? ? ? ? ? ? ?                           



                          ?+--------------------                           



                          ---+------------------                           



                          ---+------------------                           



                          -------+| ?60-89 ? ? ?                           



                          ? ? ? ? ?| ?Stage two                           



                          ? ? ? ? ?| ? Decreased                           



                          GFR ? ? ? ?                            



                          +---------------------                           



                          --+-------------------                           



                          --+-------------------                           



                          ------+| ?30-59 ? ? ?                           



                          ? ? ? ? ?| ?Stage                           



                          three ? ? ? ?| ? Stage                           



                          three ? ? ? ? ?                           



                          +---------------------                           



                          --+-------------------                           



                          --+-------------------                           



                          ------+| ?15-29 ? ? ?                           



                          ? ? ? ? ?| ?Stage four                           



                          ? ? ? ? | ? Stage four                           



                          ? ? ? ? ?                              



                          ?+--------------------                           



                          ---+------------------                           



                          ---+------------------                           



                          -------+| ?<15 (or                           



                          dialysis) ? ?| ?Stage                           



                          five ? ? ? ? | ? Stage                           



                          five ? ? ? ? ?                           



                          ?+--------------------                           



                          ---+------------------                           



                          ---+------------------                           



                          -------+ *Each stage                           



                          assumes the associated                           



                          GFR level has been in                           



                          effect for at least                           



                          three months. ?Stages                           



                          1 to 5, with or                           



                          without kidney                           



                          disease, indicate                           



                          chronic kidney                           



                          disease. Notes:                           



                          Determination of                           



                          stages one and two                           



                          (with eGFR                             



                          >59mL/min/1.73 m2)                           



                          requires estimation of                           



                          kidney damage for at                           



                          least three months as                           



                          defined by structural                           



                          or functional                           



                          abnormalities of the                           



                          kidney, manifested by                           



                          either:Pathological                           



                          abnormalities or                           



                          Markers of kidney                           



                          damage (including                           



                          abnormalities in the                           



                          composition of the                           



                          blood or urine or                           



                          abnormalities in                           



                          imaging tests).                           

 

             Lab Interpretation Abnormal                               



             (test code = 95525-4)                                        



[facility]. METABOLIC PANEL ([id number])2021 
15:36:48





             Test Item    Value        Reference Range Interpretation Comments

 

             NA (test code = 132 mmol/L   135-145      L            



             2734367140)                                         

 

             K (test code = 3.0 mmol/L   3.5-5.0      L            



             5211221121)                                         

 

             CL (test code = 98 mmol/L                        



             0240647670)                                         

 

             CO2 TOTAL (test code = 32 mmol/L    23-31        H            



             7807648786)                                         

 

             AGAP (test code =              2-16                      



             1319254388)                                         

 

             BUN (test code = 5 mg/dL      7-23         L            



             3888976280)                                         

 

             GLUCOSE (test code = 156 mg/dL           H            



             8876856675)                                         

 

             CREATININE (test code = 0.48 mg/dL   0.60-1.25    L            



             6456806299)                                         

 

             TOTAL BILI (test code = 4.7 mg/dL    0.1-1.1      H            



             2790767681)                                         

 

             CALCIUM (test code = 7.6 mg/dL    8.6-10.6     L            



             2606770577)                                         

 

             T PROTEIN (test code = 6.5 g/dL     6.3-8.2                   



             4861671778)                                         

 

             ALBUMIN (test code = 2.7 g/dL     3.5-5.0      L            



             9624844904)                                         

 

             ALK PHOS (test code = 109 U/L                          



             9323282642)                                         

 

             ALTv (test code = 22 U/L       5-50                      



             1742-6)                                             

 

             AST(SGOT) (test code = 70 U/L       13-40        H            



             6460519520)                                         

 

             eGFR (test code =              mL/min/1.73m2              



             1964358356)                                         

 

             RENETTA (test code = RENETTA) Association of                           



                          Glomerular Filtration                           



                          Rate (GFR) and Staging                           



                          of Kidney Disease*                           



                          +---------------------                           



                          --+-------------------                           



                          --+-------------------                           



                          ------+| GFR                           



                          (mL/min/1.73 m2) ?|                           



                          With Kidney Damage ?|                           



                          ?Without Kidney                           



                          Damage+---------------                           



                          --------+-------------                           



                          --------+-------------                           



                          ------------+| ?>90 ?                           



                          ? ? ? ? ? ? ? ?|                           



                          ?Stage one ? ? ? ? ?|                           



                          ? Normal ? ? ? ? ? ? ?                           



                          ?+--------------------                           



                          ---+------------------                           



                          ---+------------------                           



                          -------+| ?60-89 ? ? ?                           



                          ? ? ? ? ?| ?Stage two                           



                          ? ? ? ? ?| ? Decreased                           



                          GFR ? ? ? ?                            



                          +---------------------                           



                          --+-------------------                           



                          --+-------------------                           



                          ------+| ?30-59 ? ? ?                           



                          ? ? ? ? ?| ?Stage                           



                          three ? ? ? ?| ? Stage                           



                          three ? ? ? ? ?                           



                          +---------------------                           



                          --+-------------------                           



                          --+-------------------                           



                          ------+| ?15-29 ? ? ?                           



                          ? ? ? ? ?| ?Stage four                           



                          ? ? ? ? | ? Stage four                           



                          ? ? ? ? ?                              



                          ?+--------------------                           



                          ---+------------------                           



                          ---+------------------                           



                          -------+| ?<15 (or                           



                          dialysis) ? ?| ?Stage                           



                          five ? ? ? ? | ? Stage                           



                          five ? ? ? ? ?                           



                          ?+--------------------                           



                          ---+------------------                           



                          ---+------------------                           



                          -------+ *Each stage                           



                          assumes the associated                           



                          GFR level has been in                           



                          effect for at least                           



                          three months. ?Stages                           



                          1 to 5, with or                           



                          without kidney                           



                          disease, indicate                           



                          chronic kidney                           



                          disease. Notes:                           



                          Determination of                           



                          stages one and two                           



                          (with eGFR                             



                          >59mL/min/1.73 m2)                           



                          requires estimation of                           



                          kidney damage for at                           



                          least three months as                           



                          defined by structural                           



                          or functional                           



                          abnormalities of the                           



                          kidney, manifested by                           



                          either:Pathological                           



                          abnormalities or                           



                          Markers of kidney                           



                          damage (including                           



                          abnormalities in the                           



                          composition of the                           



                          blood or urine or                           



                          abnormalities in                           



                          imaging tests).                           

 

             Lab Interpretation Abnormal                               



             (test code = 49560-5)                                        



[facility] WITH [id number] 15:18:36





             Test Item    Value        Reference Range Interpretation Comments

 

             WBC (test code =              See_Comment                [Automated



             6690-2)                                             message] The sy

stem



                                                                 which generated



                                                                 this result



                                                                 transmitted



                                                                 reference range

:



                                                                 4.20 - 10.70



                                                                 10*3/?L. The



                                                                 reference range

 was



                                                                 not used to



                                                                 interpret this



                                                                 result as



                                                                 normal/abnormal

.

 

             RBC (test code =              See_Comment  L             [Automated



             789-8)                                              message] The sy

stem



                                                                 which generated



                                                                 this result



                                                                 transmitted



                                                                 reference range

:



                                                                 4.26 - 5.52



                                                                 10*6/?L. The



                                                                 reference range

 was



                                                                 not used to



                                                                 interpret this



                                                                 result as



                                                                 normal/abnormal

.

 

             HGB (test code = 8.7 g/dL     12.2-16.4    L            



             718-7)                                              

 

             HCT (test code = 28.1 %       38.4-49.3    L            



             4544-3)                                             

 

             MCV (test code = 80.7 fL      81.7-95.6    L            



             787-2)                                              

 

             MCH (test code = 25.0 pg      26.1-32.7    L            



             785-6)                                              

 

             MCHC (test code = 31.0 g/dL    31.2-35.0    L            



             786-4)                                              

 

             RDW-SD (test code = 50.6 fL      38.5-51.6                 



             57319-5)                                            

 

             RDW-CV (test code = 17.6 %       12.1-15.4    H            



             788-0)                                              

 

             PLT (test code =              See_Comment  LL            [Automated



             777-3)                                              message] The sy

stem



                                                                 which generated



                                                                 this result



                                                                 transmitted



                                                                 reference range

:



                                                                 150 - 328 10*3/

?L.



                                                                 The reference r

moose



                                                                 was not used to



                                                                 interpret this



                                                                 result as



                                                                 normal/abnormal

.

 

             MPV (test code =                                        Not Measure

d



             42413-7)                                            

 

             IPF % (test code = 12.5 %       1.2-10.7     H            Platelet 

count



             [id number])                                         measured by



                                                                 fluorescence



                                                                 method.

 

             NRBC/100 WBC (test              See_Comment                [Automat

ed



             code = 7684982902)                                        message] 

The system



                                                                 which generated



                                                                 this result



                                                                 transmitted



                                                                 reference range

:



                                                                 0.0 - 10.0 /100



                                                                 WBCs. The refer

ence



                                                                 range was not u

sed



                                                                 to interpret th

is



                                                                 result as



                                                                 normal/abnormal

.

 

             NRBC x10^3 (test code <0.01        See_Comment                [Auto

mated



             = [id number])                                        message] The s

ystem



                                                                 which generated



                                                                 this result



                                                                 transmitted



                                                                 reference range

:



                                                                 10*3/?L. The



                                                                 reference range

 was



                                                                 not used to



                                                                 interpret this



                                                                 result as



                                                                 normal/abnormal

.

 

             GRAN MAT (NEUT) % 67.6 %                                 



             (test code = 770-8)                                        

 

             IMM GRAN % (test code 0.20 %                                 



             = 3355974113)                                        

 

             LYMPH % (test code = 16.1 %                                 



             736-9)                                              

 

             MONO % (test code = 13.3 %                                 



             5905-5)                                             

 

             EOS % (test code = 1.9 %                                  



             713-8)                                              

 

             BASO % (test code = 0.9 %                                  



             706-2)                                              

 

             GRAN MAT x10^3(ANC) 2.89 10*3/uL 1.99-6.95                 



             (test code =                                        



             5939884941)                                         

 

             IMM GRAN x10^3 (test <0.03        0.00-0.06                 



             code = 7758144918)                                        

 

             LYMPH x10^3 (test code 0.69 10*3/uL 1.09-3.23    L            



             = 731-0)                                            

 

             MONO x10^3 (test code 0.57 10*3/uL 0.36-1.02                 



             = 742-7)                                            

 

             EOS x10^3 (test code = 0.08 10*3/uL 0.06-0.53                 



             711-2)                                              

 

             BASO x10^3 (test code 0.04 10*3/uL 0.01-0.09                 



             = 704-7)                                            

 

             Lab Interpretation Abnormal                               



             (test code = 33821-5)                                        



[facility] WITH [id number] 15:18:36





             Test Item    Value        Reference Range Interpretation Comments

 

             WBC (test code =              See_Comment                [Automated



             6690-2)                                             message] The sy

stem



                                                                 which generated



                                                                 this result



                                                                 transmitted



                                                                 reference range

:



                                                                 4.20 - 10.70



                                                                 10*3/?L. The



                                                                 reference range

 was



                                                                 not used to



                                                                 interpret this



                                                                 result as



                                                                 normal/abnormal

.

 

             RBC (test code =              See_Comment  L             [Automated



             789-8)                                              message] The sy

stem



                                                                 which generated



                                                                 this result



                                                                 transmitted



                                                                 reference range

:



                                                                 4.26 - 5.52



                                                                 10*6/?L. The



                                                                 reference range

 was



                                                                 not used to



                                                                 interpret this



                                                                 result as



                                                                 normal/abnormal

.

 

             HGB (test code = 8.7 g/dL     12.2-16.4    L            



             718-7)                                              

 

             HCT (test code = 28.1 %       38.4-49.3    L            



             4544-3)                                             

 

             MCV (test code = 80.7 fL      81.7-95.6    L            



             787-2)                                              

 

             MCH (test code = 25.0 pg      26.1-32.7    L            



             785-6)                                              

 

             MCHC (test code = 31.0 g/dL    31.2-35.0    L            



             786-4)                                              

 

             RDW-SD (test code = 50.6 fL      38.5-51.6                 



             76207-8)                                            

 

             RDW-CV (test code = 17.6 %       12.1-15.4    H            



             788-0)                                              

 

             PLT (test code =              See_Comment  LL            [Automated



             777-3)                                              message] The sy

stem



                                                                 which generated



                                                                 this result



                                                                 transmitted



                                                                 reference range

:



                                                                 150 - 328 10*3/

?L.



                                                                 The reference r

moose



                                                                 was not used to



                                                                 interpret this



                                                                 result as



                                                                 normal/abnormal

.

 

             MPV (test code =                                        Not Measure

d



             39623-2)                                            

 

             IPF % (test code = 12.5 %       1.2-10.7     H            Platelet 

count



             0229802082)                                         measured by



                                                                 fluorescence



                                                                 method.

 

             NRBC/100 WBC (test              See_Comment                [Automat

ed



             code = 6893842068)                                        message] 

The system



                                                                 which generated



                                                                 this result



                                                                 transmitted



                                                                 reference range

:



                                                                 0.0 - 10.0 /100



                                                                 WBCs. The refer

ence



                                                                 range was not u

sed



                                                                 to interpret th

is



                                                                 result as



                                                                 normal/abnormal

.

 

             NRBC x10^3 (test code <0.01        See_Comment                [Auto

mated



             = 0753906367)                                        message] The s

ystem



                                                                 which generated



                                                                 this result



                                                                 transmitted



                                                                 reference range

:



                                                                 10*3/?L. The



                                                                 reference range

 was



                                                                 not used to



                                                                 interpret this



                                                                 result as



                                                                 normal/abnormal

.

 

             GRAN MAT (NEUT) % 67.6 %                                 



             (test code = 770-8)                                        

 

             IMM GRAN % (test code 0.20 %                                 



             = 6774284272)                                        

 

             LYMPH % (test code = 16.1 %                                 



             736-9)                                              

 

             MONO % (test code = 13.3 %                                 



             5905-5)                                             

 

             EOS % (test code = 1.9 %                                  



             713-8)                                              

 

             BASO % (test code = 0.9 %                                  



             706-2)                                              

 

             GRAN MAT x10^3(ANC) 2.89 10*3/uL 1.99-6.95                 



             (test code =                                        



             5262250215)                                         

 

             IMM GRAN x10^3 (test <0.03        0.00-0.06                 



             code = 7394072961)                                        

 

             LYMPH x10^3 (test code 0.69 10*3/uL 1.09-3.23    L            



             = 731-0)                                            

 

             MONO x10^3 (test code 0.57 10*3/uL 0.36-1.02                 



             = 742-7)                                            

 

             EOS x10^3 (test code = 0.08 10*3/uL 0.06-0.53                 



             711-2)                                              

 

             BASO x10^3 (test code 0.04 10*3/uL 0.01-0.09                 



             = 704-7)                                            

 

             Lab Interpretation Abnormal                               



             (test code = 96159-0)                                        



[facility].PROTEIN BODY [id number] 03:21:38





             Test Item    Value        Reference Range Interpretation Comments

 

             T.PROT BF (test 1477.0 mg/dL                           



             code = 2068528780)                                        

 

             UNSPUN BODY FLUID Yellow                                 



             COLOR (test code =                                        



             0732503180)                                         

 

             UNSPUN BODY FLUID Slightly Cloudy                           



             CLARITY (test code                                        



             = 7761234633)                                        

 

             SPUN BODY FLUID Yellow                                 



             COLOR (test code =                                        



             2652115565)                                         

 

             SPUN BODY FLUID Clear                                  



             CLARITY (test code                                        



             = 0260760238)                                        

 

             Sediment (test code The sediment volume is                         

  



             = 8425935671) <0.1 mLs of the total                           



                          fluid volume of 4mL and                           



                          its color is red.                           

 

             RENETTA (test code = Test developed and                           



             RENETTA)         characteristics determined                           



                          by [facility]                           



                          Services.                              



[facility].PROTEIN BODY [id number] 03:21:38





             Test Item    Value        Reference Range Interpretation Comments

 

             T.PROT BF (test 1477.0 mg/dL                           



             code = 5271260738)                                        

 

             UNSPUN BODY FLUID Yellow                                 



             COLOR (test code =                                        



             2640601170)                                         

 

             UNSPUN BODY FLUID Slightly Cloudy                           



             CLARITY (test code                                        



             = 8965130887)                                        

 

             SPUN BODY FLUID Yellow                                 



             COLOR (test code =                                        



             5521695312)                                         

 

             SPUN BODY FLUID Clear                                  



             CLARITY (test code                                        



             = 5115929788)                                        

 

             Sediment (test code The sediment volume is                         

  



             = 2872585855) <0.1 mLs of the total                           



                          fluid volume of 4mL and                           



                          its color is red.                           

 

             RENETTA (test code = Test developed and                           



             RENETTA)         characteristics determined                           



                          by [facility]                           



                          Services.                              



[facility] [id number] 18:50:33





             Test Item    Value        Reference Range Interpretation Comments

 

             BF COLOR     Light Yellow                           



             (test code =                                        



             3513448423)                                         

 

             BF WBC Count              See_Comment                [Automated



             (test code =                                        message] The sy

stem



             2927277402)                                         which generated



                                                                 this result



                                                                 transmitted



                                                                 reference range

:



                                                                 /?L. The refere

nce



                                                                 range was not u

sed



                                                                 to interpret th

is



                                                                 result as



                                                                 normal/abnormal

.

 

             BF RBC Count              See_Comment                [Automated



             (test code =                                        message] The sy

stem



             7692435903)                                         which generated



                                                                 this result



                                                                 transmitted



                                                                 reference range

:



                                                                 /?L. The refere

nce



                                                                 range was not u

sed



                                                                 to interpret th

is



                                                                 result as



                                                                 normal/abnormal

.

 

             RENETTA (test    The reference range                           



             code = RENETTA)  and other method                           



                          performance                            



                          specifications have                           



                          not been established                           



                          for this body fluid.                           



                          ?The test results must                           



                          be integrated into the                           



                          clinical context for                           



                          interpretation.                           



[facility] [id number] 18:50:33





             Test Item    Value        Reference Range Interpretation Comments

 

             BF COLOR     Light Yellow                           



             (test code =                                        



             4796335162)                                         

 

             BF WBC Count              See_Comment                [Automated



             (test code =                                        message] The sy

stem



             1819879036)                                         which generated



                                                                 this result



                                                                 transmitted



                                                                 reference range

:



                                                                 /?L. The refere

nce



                                                                 range was not u

sed



                                                                 to interpret th

is



                                                                 result as



                                                                 normal/abnormal

.

 

             BF RBC Count              See_Comment                [Automated



             (test code =                                        message] The sy

stem



             1490643606)                                         which generated



                                                                 this result



                                                                 transmitted



                                                                 reference range

:



                                                                 /?L. The refere

nce



                                                                 range was not u

sed



                                                                 to interpret th

is



                                                                 result as



                                                                 normal/abnormal

.

 

             RENETTA (test    The reference range                           



             code = RENETTA)  and other method                           



                          performance                            



                          specifications have                           



                          not been established                           



                          for this body fluid.                           



                          ?The test results must                           



                          be integrated into the                           



                          clinical context for                           



                          interpretation.                           



[facility]
2021 18:23:38Successful US-guided paracentesis.PROCEDURE: US-guided 
paracentesis HISTORY: Ascites, paracentesis is requested. MEDICATIONS: Local 
lidocaine. I reviewed the patient?s current medicationlist as noted in the 
nursing assessment, and the following actions weretaken: None []. ATTENDING 
PRESENCE: ?As the attending radiologist, I was present in theroom during the 
entire procedure. TECHNIQUE: Informed consent was obtained from the patient 
after discussingthe risks and benefits of the procedure. Universal protocol 
timeout wasperformed verifying correct patient, correct site, and correct 
procedure.Images were archived to PACS. The patient was prepped and draped in 
sterilefashion. US-guided drainage of the ascites was performed with a 5 
Frenchsheathed needle, after infiltrating local anesthesia at the puncture 
site.A total of 3000cc clear yellowish fluid was drained. Fluid was sent for 
laboratory analysisas requested in Epic. COMPLICATIONS: None. ? Estimated Blood 
Loss : &lt;1 cc [facility], Radiant Results Inft User - 2021 1:24 PM 
CDTFormatting of this note might be different from the original.PROCEDURE: US-
guided paracentesisHISTORY: Ascites, paracentesis is requested.MEDICATIONS: 
Local lidocaine. Ireviewed the patient?s current medicationlist as noted in the 
nursing assessment, and the following actions weretaken: None []. ATTENDING 
PRESENCE: As the attending radiologist, I was present in theroom during the 
entire procedure.TECHNIQUE: Informed consent was obtained from the patient after
 discussingthe risks and benefits of the procedure. Universal protocol timeout 
wasperformed verifying correctpatient, correct site, and correct 
procedure.Images were archived to PACS. The patient was prepped and draped in 
sterilefashion. US-guided drainage of the ascites was performed with a 5 
Frenchsheathed needle, after infiltrating local anesthesia at the puncture 
site.A total of 3000cc clear yellowish fluid was drained. Fluid was sent for 
laboratory analysis as requested in Epic.COMPLICATIONS: None. Estimated Blood 
Loss : &lt;1 ccIMPRESSIONSuccessful US-guided paracentesis.[facility]ECENTES[facility] WITH [id number] 18:23:38
Successful US-guided paracentesis.PROCEDURE: US-guided paracentesis HISTORY: 
Ascites, paracentesis is requested. MEDICATIONS: Local lidocaine. I reviewed the
 patient?s current medicationlist as noted in the nursing assessment, and the 
following actions weretaken: None []. ATTENDING PRESENCE: ?As the attending 
radiologist, I was present in theroom during the entire procedure. TECHNIQUE: 
Informed consent was obtained from the patient after discussingthe risks and 
benefits of the procedure. Universal protocol timeout wasperformed verifying 
correct patient, correct site, and correct procedure.Images were archived to 
PACS. The patient was prepped and draped in sterilefashion. US-guided drainage 
of the ascites was performed with a 5 Frenchsheathed needle, after infiltrating 
local anesthesia at the puncture site.A total of 3000cc clear yellowish fluid 
was drained. Fluid was sent for laboratory analysisas requested in Epic. 
COMPLICATIONS: None. ? Estimated Blood Loss : &lt;1 cc [facility], Radiant Results In
ft 2021 1:24 PM CDTFormatting of this note might be different from 
the original.PROCEDURE: US-guided paracentesisHISTORY: Ascites, paracentesis is 
requested.MEDICATIONS: Local lidocaine. Ireviewed the patient?s current 
medicationlist as noted in the nursing assessment, and the following actions 
weretaken: None []. ATTENDING PRESENCE: As the attending radiologist, I was 
present in theroom during the entire procedure.TECHNIQUE: Informed consent was 
obtained from the patient after discussingthe risks and benefits of the 
procedure. Universal protocol timeout wasperformed verifying correctpatient, 
correct site, and correct procedure.Images were archived to PACS. The patient 
was prepped and draped in sterilefashion. US-guided drainage of the ascites was 
performed with a 5 Frenchsheathed needle, after infiltrating local anesthesia at
 the puncture site.A total of 3000cc clear yellowish fluid was drained. Fluid 
was sent for laboratory analysis as requested in Epic.COMPLICATIONS: None. Fatmata
mated Blood Loss : &lt;1 ccIMPRESSIONSuccessful US-guided paracentesis.
[facility] [id number] 
18:15:47





             Test Item    Value        Reference Range Interpretation Comments

 

             MAI (test code = Negative     Negative                  



             [id number])                                         

 

             RENETTA (test code = RENETTA) Negative - No                           



                          Anti-Nuclear                           



                          Antibodies detected                           



                          by IFA.Positive -                           



                          MAI IFA screen                           



                          performed with a                           



                          1:80 dilution in                           



                          adults and a 1:40                           



                          dilution in                            



                          pediatrics. Any MAI                           



                          "Positive" will have                           



                          titer performed and                           



                          reported separately.                           

 

             Lab Interpretation (test Normal                                 



             code = 49111-7)                                        



[facility] [id number] 
18:15:47





             Test Item    Value        Reference Range Interpretation Comments

 

             MAI (test code = Negative     Negative                  



             [id number])                                         

 

             RENETTA (test code = RENETTA) Negative - No                           



                          Anti-Nuclear                           



                          Antibodies detected                           



                          by IFA.Positive -                           



                          MAI IFA screen                           



                          performed with a                           



                          1:80 dilution in                           



                          adults and a 1:40                           



                          dilution in                            



                          pediatrics. Any MAI                           



                          "Positive" will have                           



                          titer performed and                           



                          reported separately.                           

 

             Lab Interpretation (test Normal                                 



             code = 17426-9)                                        



[facility] [id number] 17:44:30





             Test Item    Value        Reference Range Interpretation Comments

 

             Anti-Trypsin (test code = 141 mg/dL                        



             4760001072)                                         

 

             Lab Interpretation (test code = Normal                             

    



             50078-1)                                            



[facility] 1 [id number] 17:44:30





             Test Item    Value        Reference Range Interpretation Comments

 

             Anti-Trypsin (test code = 141 mg/dL                        



             5534246556)                                         

 

             Lab Interpretation (test code = Normal                             

    



             74847-3)                                            



[facility] 17:43:58





             Test Item    Value        Reference Range Interpretation Comments

 

             CERULO (test code = 7479334484) 24 mg/dL     25-63        L        

    

 

             Lab Interpretation (test code = Abnormal                           

    



             91674-4)                                            



[facility] -13-61 17:43:58





             Test Item    Value        Reference Range Interpretation Comments

 

             IgG (test code = 0272335639) 1850 mg/dL   636-1600     H           

 

 

             Lab Interpretation (test code = Abnormal                           

    



             25345-8)                                            



[facility] 17:43:58





             Test Item    Value        Reference Range Interpretation Comments

 

             CERULO (test code = 0355708556) 24 mg/dL     25-63        L        

    

 

             Lab Interpretation (test code = Abnormal                           

    



             45523-9)                                            



[facility] -14-06 17:43:58





             Test Item    Value        Reference Range Interpretation Comments

 

             IgG (test code = 1850529745) 1850 mg/dL   636-1600     H           

 

 

             Lab Interpretation (test code = Abnormal                           

    



             42085-6)                                            



[facility] A AB, IGG AND [id number] 11:37:51





             Test Item    Value        Reference Range Interpretation Comments

 

             HAV Total (test code Positive                               



             = 0242527190)                                        

 

             HAVT                                                



             Semi-Quantitative                                        



             (test code =                                        



             6154603839)                                         

 

             RENETTA (test code = RENETTA) Indicates past or                           



                          present infection with                           



                          HAV or exposure to HAV                           



                          due to vaccination.                           



[facility] A AB, IGG AND [id number] 11:37:51





             Test Item    Value        Reference Range Interpretation Comments

 

             HAV Total (test code Positive                               



             = 8753079461)                                        

 

             HAVT                                                



             Semi-Quantitative                                        



             (test code =                                        



             4284775800)                                         

 

             RENETTA (test code = RENETTA) Indicates past or                           



                          present infection with                           



                          HAV or exposure to HAV                           



                          due to vaccination.                           



[facility] HospitalCB with [id number] 07:43:16





             Test Item    Value        Reference Range Interpretation Comments

 

             WBC (test code =              See_Comment  L             [Automated



             6690-2)                                             message] The



                                                                 system which



                                                                 generated this



                                                                 result transmit

michelle



                                                                 reference range

:



                                                                 4.20 - 10.70



                                                                 10*3/?L. The



                                                                 reference range



                                                                 was not used to



                                                                 interpret this



                                                                 result as



                                                                 normal/abnormal

.

 

             RBC (test code =              See_Comment  L             [Automated



             789-8)                                              message] The



                                                                 system which



                                                                 generated this



                                                                 result transmit

michelle



                                                                 reference range

:



                                                                 4.26 - 5.52



                                                                 10*6/?L. The



                                                                 reference range



                                                                 was not used to



                                                                 interpret this



                                                                 result as



                                                                 normal/abnormal

.

 

             HGB (test code = 7.3 g/dL     12.2-16.4    L            



             718-7)                                              

 

             HCT (test code = 23.8 %       38.4-49.3    L            



             4544-3)                                             

 

             MCV (test code = 81.5 fL      81.7-95.6    L            



             787-2)                                              

 

             MCH (test code = 25.0 pg      26.1-32.7    L            



             785-6)                                              

 

             MCHC (test code = 30.7 g/dL    31.2-35.0    L            



             786-4)                                              

 

             RDW-SD (test code = 51.1 fL      38.5-51.6                 



             07694-7)                                            

 

             RDW-CV (test code = 17.4 %       12.1-15.4    H            



             788-0)                                              

 

             PLT (test code =              See_Comment  LL            [Automated



             777-3)                                              message] The



                                                                 system which



                                                                 generated this



                                                                 result transmit

michelle



                                                                 reference range

:



                                                                 150 - 328 10*3/

?L.



                                                                 The reference



                                                                 range was not u

sed



                                                                 to interpret th

is



                                                                 result as



                                                                 normal/abnormal

.

 

             MPV (test code =                                        Not Measure

d



             62071-7)                                            

 

             IPF % (test code = 11.9 %       1.2-10.7     H            Platelet 

count



             4101379134)                                         measured by



                                                                 fluorescence



                                                                 method.

 

             NRBC/100 WBC (test              See_Comment                [Automat

ed



             code = 1458705342)                                        message] 

The



                                                                 system which



                                                                 generated this



                                                                 result transmit

michelle



                                                                 reference range

:



                                                                 0.0 - 10.0 /100



                                                                 WBCs. The



                                                                 reference range



                                                                 was not used to



                                                                 interpret this



                                                                 result as



                                                                 normal/abnormal

.

 

             NRBC x10^3 (test code <0.01        See_Comment                [Auto

mated



             = 0537934332)                                        message] The



                                                                 system which



                                                                 generated this



                                                                 result transmit

michelle



                                                                 reference range

:



                                                                 10*3/?L. The



                                                                 reference range



                                                                 was not used to



                                                                 interpret this



                                                                 result as



                                                                 normal/abnormal

.

 

             SEG % (test code = 55 %         33-76                     



             02957-9)                                            

 

             LYMPH % (test code = 36 %         14-54                     



             58859-7)                                            

 

             MONO % (test code = 8 %          0-4          H            



             26485-3)                                            

 

             EOS % (test code = 1 %          0-3                       



             81672-0)                                            

 

             ANC (test code = 1.99 10*3/uL 1.99-6.95                 



             1867707241)                                         

 

             TARGET CELLS (test 2+           See_Comment  A             [Automat

ed



             code = 76670-4)                                        message] The



                                                                 system which



                                                                 generated this



                                                                 result transmit

michelle



                                                                 reference range

:



                                                                 (none). The



                                                                 reference range



                                                                 was not used to



                                                                 interpret this



                                                                 result as



                                                                 normal/abnormal

.

 

             PLT ESTIMATE (test Critically   Normal       AA           



             code = 9317-9) Decreased                              

 

             GIANT PLATELETS (test Present      See_Comment  A             [Auto

mated



             code = 5908-9)                                        message] The



                                                                 system which



                                                                 generated this



                                                                 result transmit

michelle



                                                                 reference range

:



                                                                 (none). The



                                                                 reference range



                                                                 was not used to



                                                                 interpret this



                                                                 result as



                                                                 normal/abnormal

.

 

             Lab Interpretation Abnormal                               



             (test code = 15486-5)                                        



[facility]ION[facility] [id number] 07:02:16





             Test Item    Value        Reference Range Interpretation Comments

 

             IONIZED CA (test code = 4.00 mg/dL   4.50-5.30    L            



             1499065591)                                         

 

             PH SERUM (test code = 3365110488)              7.35-7.45    H      

      QUES

 

             Lab Interpretation (test code = Abnormal                           

    



             51676-6)                                            



[facility] [id number] 07:02:16





             Test Item    Value        Reference Range Interpretation Comments

 

             IONIZED CA (test code = 4.00 mg/dL   4.50-5.30    L            



             8681677727)                                         

 

             PH SERUM (test code = 9155209658)              7.35-7.45    H      

      QUES

 

             Lab Interpretation (test code = Abnormal                           

    



             41880-7)                                            



[facility] 06:38:14





             Test Item    Value        Reference Range Interpretation Comments

 

             MAGNESIUM (test code = 3344514895) 1.5 mg/dL    1.7-2.4      L     

       

 

             Lab Interpretation (test code = Abnormal                           

    



             24177-8)                                            



[facility] 06:38:14





             Test Item    Value        Reference Range Interpretation Comments

 

             PHOSPHORUS (test code = 8850578762) 3.5 mg/dL    2.5-5.0           

        

 

             Lab Interpretation (test code = Normal                             

    



             34584-1)                                            



[facility] 06:38:14





             Test Item    Value        Reference Range Interpretation Comments

 

             PHOSPHORUS (test code = 9634411255) 3.5 mg/dL    2.5-5.0           

        

 

             Lab Interpretation (test code = Normal                             

    



             41275-9)                                            



[facility] 06:38:14





             Test Item    Value        Reference Range Interpretation Comments

 

             MAGNESIUM (test code = 9935529265) 1.5 mg/dL    1.7-2.4      L     

       

 

             Lab Interpretation (test code = Abnormal                           

    



             06827-8)                                            



[facility]AT[facility] SURFACE [id number] 
06:35:12





             Test Item    Value        Reference Range Interpretation Comments

 

             HBsAB (test code = Negative                               



             8497736402)                                         

 

             HBsAb                     mIU/mL                    



             Semi-Quantitative                                        



             (test code =                                        



             1844735669)                                         

 

             RENETTA (test code = Interpretation:                           



             RENETTA)         ?Hepatitis B Surface                           



                          Antibody ? ? ? ? Negative                           



                          - Patient is considered                           



                          to be not immune to                           



                          infection with HBV. ? ?                           



                          Positive - Anti-HBs                           



                          detected at greater than                           



                          or equal to 12 mIU/mL.                           



                          ?Patient is considered to                           



                          be immune to infection                           



                          with HBV. ?                            



[facility] &amp; IGG[facility]2021 
06:35:12





             Test Item    Value        Reference Range Interpretation Comments

 

             HBC (test code = 0756811486) Negative                              

 

 

             HBC Semi-Quantitative (test code =                                 

       



             9422854590)                                         



[facility] B SURFACE [id number] 
06:35:12





             Test Item    Value        Reference Range Interpretation Comments

 

             HBsAB (test code = Negative                               



             8327637600)                                         

 

             HBsAb                     mIU/mL                    



             Semi-Quantitative                                        



             (test code =                                        



             1289169967)                                         

 

             RENETTA (test code = Interpretation:                           



             RENETTA)         ?Hepatitis B Surface                           



                          Antibody ? ? ? ? Negative                           



                          - Patient is considered                           



                          to be not immune to                           



                          infection with HBV. ? ?                           



                          Positive - Anti-HBs                           



                          detected at greater than                           



                          or equal to 12 mIU/mL.                           



                          ?Patient is considered to                           



                          be immune to infection                           



                          with HBV. ?                            



[facility] [facility] [id number] 06:35:12





             Test Item    Value        Reference Range Interpretation Comments

 

             HCV Ab (test code = 87343-9) Negative                              

 

 

             HCV Semi-Quantitative (test code =                                 

       



             77131-8)                                            



[facility] 
06:35:12





             Test Item    Value        Reference Range Interpretation Comments

 

             HBC (test code = 7917393548) Negative                              

 

 

             HBC Semi-Quantitative (test code =                                 

       



             4492390319)                                         



[facility] C VIRUS [id number] 06:35:12





             Test Item    Value        Reference Range Interpretation Comments

 

             HCV Ab (test code = 79920-7) Negative                              

 

 

             HCV Semi-Quantitative (test code =                                 

       



             50018-3)                                            



[facility] [id number] 06:20:51





             Test Item    Value        Reference Range Interpretation Comments

 

             AFP (test code = 3.8 ng/mL    See_Comment                [Automated



             [id number])                                         message] The



                                                                 system which



                                                                 generated this



                                                                 result



                                                                 transmitted



                                                                 reference range

:



                                                                 <=7.5. The



                                                                 reference range



                                                                 was not used to



                                                                 interpret this



                                                                 result as



                                                                 normal/abnormal

.

 

             RENETTA (test code = RENETTA) Biotin has been                           



                          reported to                            



                          cause a negative                           



                          bias, interpret                           



                          results relative                           



                          to patient's use                           



                          of biotin.                             

 

             Lab Interpretation Normal                                 



             (test code = 34703-7)                                        



[facility] [id number] 06:20:51





             Test Item    Value        Reference Range Interpretation Comments

 

             AFP (test code = 3.8 ng/mL    See_Comment                [Automated



             [id number])                                         message] The



                                                                 system which



                                                                 generated this



                                                                 result



                                                                 transmitted



                                                                 reference range

:



                                                                 <=7.5. The



                                                                 reference range



                                                                 was not used to



                                                                 interpret this



                                                                 result as



                                                                 normal/abnormal

.

 

             RENETTA (test code = RENETTA) Biotin has been                           



                          reported to                            



                          cause a negative                           



                          bias, interpret                           



                          results relative                           



                          to patient's use                           



                          of biotin.                             

 

             Lab Interpretation Normal                                 



             (test code = 95616-8)                                        



[facility] Packed RBC (in units), 1 Units
2021 02:14:47





             Test Item    Value        Reference Range Interpretation Comments

 

             Cross Match Result Compatible                             



             (test code = 4409)                                        

 

             ISBT Blood Type Code                                        



             (test code = 813526)                                        

 

             Unit Blood Type (test O Pos                                  



             code = 4410)                                        

 

             Unit Number (test [id number]                           



             code = 4411)                                        

 

             Blood Expiration Date                                        



             & Time (test code =                                        



             657217)                                             

 

             Status Information Issued                                 



             (test code = 4412)                                        

 

             Product      Red Blood Cells                           



             Identification (test                                        



             code = 4413)                                        

 

             Product Code (test [id number]                               Performed

 at [facility]



             code = 4414)                                        Laboratory



                                                                 Services - [facility]



                                                                 Blood [address] [place], [place] [id number]



                                                                 Free:



                                                                 [phone]

A



                                                                 No. [id number]



[facility] Packed RBC (in units), 1 Units
2021 02:14:47





             Test Item    Value        Reference Range Interpretation Comments

 

             Cross Match Result Compatible                             



             (test code = 4409)                                        

 

             ISBT Blood Type Code                                        



             (test code = 319203)                                        

 

             Unit Blood Type (test O Pos                                  



             code = 4410)                                        

 

             Unit Number (test [id number]                           



             code = 4411)                                        

 

             Blood Expiration Date                                        



             & Time (test code =                                        



             130698)                                             

 

             Status Information Issued                                 



             (test code = 4412)                                        

 

             Product      Red Blood Cells                           



             Identification (test                                        



             code = 4413)                                        

 

             Product Code (test [id number]                               Performed

 at [facility]



             code = 4414)                                        Laboratory



                                                                 Services - [facility]



                                                                 Blood [address] [place] [address]



                                                                 [id number]



                                                                 Free:



                                                                 [phone]

A



                                                                 No. [id number]



[facility] ABDOMEN LIMITED WITH [id number] 
01:26:53Impression: 1. ?Cirrhosis.2. ?Enlarged portal vein and mild 
splenomegaly, suggestive of portalhypertension. Hepatopedal flow is seen in the 
main portal vein.3. Mild gallbladder wall thickening, which is nonspecific and 
may be seenwith cholecystitis, hepatitis, liver failure, or hypervolemia.4. 
Moderate abdominal ascites. Left pleural effusion. RL: 2824 End of Report 
Electronically signed by Fernandez Baker MD at 2021 8:26 PMExam: Limited 
Abdominal Ultrasound, 2021 7:00 PM. Ordering Physician: AMY CALDERA. 
History: Ascites. Comparison: None. Technique: Grayscale and color Doppler 
ultrasound images of the right upperquadrant abdomen were obtained. Technical 
Quality: Examination is slightlysuboptimal due to body habitusand overlying 
bowel gas. Findings: ?Liver is nodular in contour and heterogeneous in 
echotexture. Hepatopetalflow is noted in the main portal vein. Portal vein is 
enlarged. There is nointrahepatic biliary ductal dilatation. Common bile duct 
measures 2 mm incaliber. Gallbladder demonstrates no evidence of stones. ?There 
is nopericholecystic fluid. Gallbladder wall measures 3 mm in 
thickness.Sonographic Gupta sign is negative. Right kidney measures 13.9 cm in 
length. ?There is no right hydronephrosis.Right kidney shows normal cortical 
thickness, corticomedullarydifferentiation and echotexture. ? Visualized 
pancreas is unremarkable. Pancreas is partially obscured byoverlying bowel gas. 
Visualized inferior vena cava and abdominal aorta are unremarkable. Spleen 
measures up to 12.6 cm in length. There is moderate free fluid. Left pleural 
effusion is partiallyvisualized. [facility], Radiant Results Inft User - 2021 
8:28 PM CDTFormatting of this note might be different from the original.Exam: 
Limited Abdominal Ultrasound, 2021 7:00 PM.Ordering Physician: AMY IBRAHIM.History: Ascites.Comparison: None.Technique: Grayscale and color Doppler 
ultrasound images of the right upperquadrant abdomen were obtained.Technical 
Quality: Examination is slightly suboptimal due to body habitusand overlying 
bowel gas.Findings: Liver is nodular in contour and heterogeneous in ec
hotexture. Hepatopetalflow is noted in the main portal vein. Portal vein is 
enlarged. There is nointrahepatic biliary ductal dilatation. Common bile duct 
measures 2 mm incaliber.Gallbladder demonstrates no evidence of stones. There is
nopericholecystic fluid. Gallbladder wall measures 3 mm in thickness.Sonographic
Gupta sign is negative.Right kidney measures 13.9 cm in length. There is no 
right hydronephrosis.Right kidney shows normal cortical thickness, 
corticomedullarydifferentiation and echotexture. Visualized pancreas is 
unremarkable. Pancreas is partially obscured byoverlying bowel gas.Visualized 
inferior vena cava and abdominal aorta are unremarkable.Spleen measures up to 
12.6 cm in length.There is moderate free fluid. Left pleural effusion is 
partiallyvisualized.IMPRESSIONImpression: 1. Cirrhosis.2. Enlarged portal vein 
and mild splenomegaly, suggestive of portalhypertension. Hepatopedalflow is seen
in the main portal vein.3. Mild gallbladder wall thickening, which is 
nonspecific and may be seenwith cholecystitis, hepatitis, liver failure, or 
hypervolemia.4. Moderate abdominal ascites. Left pleural effusion. RL: 2824End 
of ReportElectronically signed by Fernandez Baker MD at 2021 8:26 PMUn[facility] WITH [id number] 01:26:53
Impression: 1. ?Cirrhosis.2. ?Enlarged portal vein and mild splenomegaly, 
suggestive of portalhypertension. Hepatopedal flow is seen in the main portal 
vein.3. Mild gallbladder wall thickening, which is nonspecific and may be 
seenwith cholecystitis, hepatitis, liver failure, or hypervolemia.4. Moderate 
abdominal ascites. Left pleural effusion. RL: 2824 End of Report Electronically 
signed by Fernandez Baker MD at 2021 8:26 PMExam: Limited Abdominal Ultrasound, 
2021 7:00 PM. Ordering Physician: AMY CALDERA. History: Ascites. 
Comparison: None. Technique: Grayscale and color Doppler ultrasound images of 
the right upperquadrant abdomen were obtained. Technical Quality: Examination is
slightlysuboptimal due to body habitusand overlying bowel gas. Findings: ?Liver 
is nodular in contour and heterogeneous in echotexture. Hepatopetalflow is noted
in the main portal vein. Portal vein is enlarged. There is nointrahepatic 
biliary ductal dilatation. Common bile duct measures 2 mm incaliber. Gallbladder
demonstrates no evidence of stones. ?There is nopericholecystic fluid. 
Gallbladder wall measures 3 mm in thickness.Sonographic Gupta sign is negative.
Right kidney measures 13.9 cm in length. ?There is no right hydronephrosis.Right
kidney shows normal cortical thickness, corticomedullarydifferentiation and 
echotexture. ? Visualized pancreas is unremarkable. Pancreas is partially 
obscured byoverlying bowel gas. Visualized inferior vena cava and abdominal 
aorta are unremarkable. Spleen measures up to 12.6 cm in length. There is 
moderate free fluid. Left pleural effusion is partiallyvisualized. [facility], Radiant
Results Inft User - 2021 8:28 PM CDTFormatting of this note might be 
different from the original.Exam: Limited Abdominal Ultrasound, 2021 7:00 
PM.Ordering Physician: AMY CALDERA.History: Ascites.Comparison: 
None.Technique: Grayscale and color Doppler ultrasound images of the right 
upperquadrant abdomen were obtained.Technical Quality: Examination is slightly 
suboptimal due to body habitusand overlying bowel gas.Findings: Liver is nodular
in contour and heterogeneous in echotexture. Hepatopetalflow is noted in the 
main portal vein. Portal vein is enlarged. There is nointrahepatic biliary 
ductal dilatation. Common bile duct measures 2 mm incaliber.Gallbladder 
demonstrates no evidence of stones. There is nopericholecystic fluid. 
Gallbladder wall measures 3 mm in thickness.Sonographic Gupta sign is 
negative.Right kidney measures 13.9 cm in length. There is no right hydro
nephrosis.Right kidney shows normal cortical thickness, 
corticomedullarydifferentiation and echotexture. Visualized pancreas is 
unremarkable. Pancreas is partially obscured byoverlying bowel gas.Visualized 
inferior vena cava and abdominal aorta are unremarkable.Spleen measures up to 
12.6 cm in length.There is moderate free fluid. Left pleural effusion is 
partiallyvisualized.IMPRESSIONImpression: 1. Cirrhosis.2. Enlarged portal vein 
and mild splenomegaly, suggestive of portalhypertension. Hepatopedalflow is seen
in the main portal vein.3. Mild gallbladder wall thickening, which is 
nonspecific and may be seenwith cholecystitis, hepatitis, liver failure, or 
hypervolemia.4. Moderate abdominal ascites. Left pleural effusion. RL: 2824End 
of ReportElectronically signed by Fernandez Baker MD at 2021 8:26 PMUnBaylor Scott [facility] IrvingHEPATITIS B SURFACE [id number] 00:23:10





             Test Item    Value        Reference Range Interpretation Comments

 

             HBsAg Semi-Quantitative (test code = Negative     Negative         

         



             5195-3)                                             



[facility] [id number] 00:23:10





             Test Item    Value        Reference Range Interpretation Comments

 

             HBsAg Semi-Quantitative (test code = Negative     Negative         

         



             5195-3)                                             



[facility] METABOLIC PANEL (NA, K, CL, CO2, 
GLUCOSE, BUN, CREATININE, CA)2021 00:19:53





             Test Item    Value        Reference Range Interpretation Comments

 

             NA (test code = 136 mmol/L   135-145                   



             6309418253)                                         

 

             K (test code = 3.7 mmol/L   3.5-5.0                   



             5014347005)                                         

 

             CL (test code = 104 mmol/L                       



             0675214942)                                         

 

             CO2 TOTAL (test code = 26 mmol/L    23-31                     



             7046457838)                                         

 

             AGAP (test code =              2-16                      



             5238442849)                                         

 

             BUN (test code = 4 mg/dL      7-23         L            



             9988916176)                                         

 

             GLUCOSE (test code = 75 mg/dL                         



             1417949734)                                         

 

             CREATININE (test code = 0.41 mg/dL   0.60-1.25    L            



             7144667271)                                         

 

             CALCIUM (test code = 7.3 mg/dL    8.6-10.6     L            



             1420150885)                                         

 

             eGFR (test code =              mL/min/1.73m2              



             5182645836)                                         

 

             RENETTA (test code = RENETTA) Association of                           



                          Glomerular Filtration                           



                          Rate (GFR) and Staging                           



                          of Kidney Disease*                           



                          +---------------------                           



                          --+-------------------                           



                          --+-------------------                           



                          ------+| GFR                           



                          (mL/min/1.73 m2) ?|                           



                          With Kidney Damage ?|                           



                          ?Without Kidney                           



                          Damage+---------------                           



                          --------+-------------                           



                          --------+-------------                           



                          ------------+| ?>90 ?                           



                          ? ? ? ? ? ? ? ?|                           



                          ?Stage one ? ? ? ? ?|                           



                          ? Normal ? ? ? ? ? ? ?                           



                          ?+--------------------                           



                          ---+------------------                           



                          ---+------------------                           



                          -------+| ?60-89 ? ? ?                           



                          ? ? ? ? ?| ?Stage two                           



                          ? ? ? ? ?| ? Decreased                           



                          GFR ? ? ? ?                            



                          +---------------------                           



                          --+-------------------                           



                          --+-------------------                           



                          ------+| ?30-59 ? ? ?                           



                          ? ? ? ? ?| ?Stage                           



                          three ? ? ? ?| ? Stage                           



                          three ? ? ? ? ?                           



                          +---------------------                           



                          --+-------------------                           



                          --+-------------------                           



                          ------+| ?15-29 ? ? ?                           



                          ? ? ? ? ?| ?Stage four                           



                          ? ? ? ? | ? Stage four                           



                          ? ? ? ? ?                              



                          ?+--------------------                           



                          ---+------------------                           



                          ---+------------------                           



                          -------+| ?<15 (or                           



                          dialysis) ? ?| ?Stage                           



                          five ? ? ? ? | ? Stage                           



                          five ? ? ? ? ?                           



                          ?+--------------------                           



                          ---+------------------                           



                          ---+------------------                           



                          -------+ *Each stage                           



                          assumes the associated                           



                          GFR level has been in                           



                          effect for at least                           



                          three months. ?Stages                           



                          1 to 5, with or                           



                          without kidney                           



                          disease, indicate                           



                          chronic kidney                           



                          disease. Notes:                           



                          Determination of                           



                          stages one and two                           



                          (with eGFR                             



                          >59mL/min/1.73 m2)                           



                          requires estimation of                           



                          kidney damage for at                           



                          least three months as                           



                          defined by structural                           



                          or functional                           



                          abnormalities of the                           



                          kidney, manifested by                           



                          either:Pathological                           



                          abnormalities or                           



                          Markers of kidney                           



                          damage (including                           



                          abnormalities in the                           



                          composition of the                           



                          blood or urine or                           



                          abnormalities in                           



                          imaging tests).                           

 

             Lab Interpretation Abnormal                               



             (test code = 64422-5)                                        



[facility] METABOLIC PANEL (NA, K, CL, CO2, 
GLUCOSE, BUN, CREATININE, CA)2021 00:19:53





             Test Item    Value        Reference Range Interpretation Comments

 

             NA (test code = 136 mmol/L   135-145                   



             [id number])                                         

 

             K (test code = 3.7 mmol/L   3.5-5.0                   



             9372325355)                                         

 

             CL (test code = 104 mmol/L                       



             6185347905)                                         

 

             CO2 TOTAL (test code = 26 mmol/L    23-31                     



             5438975082)                                         

 

             AGAP (test code =              2-16                      



             2793726255)                                         

 

             BUN (test code = 4 mg/dL      7-23         L            



             2860910003)                                         

 

             GLUCOSE (test code = 75 mg/dL                         



             9898151340)                                         

 

             CREATININE (test code = 0.41 mg/dL   0.60-1.25    L            



             1444245835)                                         

 

             CALCIUM (test code = 7.3 mg/dL    8.6-10.6     L            



             4153148418)                                         

 

             eGFR (test code =              mL/min/1.73m2              



             9643728041)                                         

 

             RENETTA (test code = RENETTA) Association of                           



                          Glomerular Filtration                           



                          Rate (GFR) and Staging                           



                          of Kidney Disease*                           



                          +---------------------                           



                          --+-------------------                           



                          --+-------------------                           



                          ------+| GFR                           



                          (mL/min/1.73 m2) ?|                           



                          With Kidney Damage ?|                           



                          ?Without Kidney                           



                          Damage+---------------                           



                          --------+-------------                           



                          --------+-------------                           



                          ------------+| ?>90 ?                           



                          ? ? ? ? ? ? ? ?|                           



                          ?Stage one ? ? ? ? ?|                           



                          ? Normal ? ? ? ? ? ? ?                           



                          ?+--------------------                           



                          ---+------------------                           



                          ---+------------------                           



                          -------+| ?60-89 ? ? ?                           



                          ? ? ? ? ?| ?Stage two                           



                          ? ? ? ? ?| ? Decreased                           



                          GFR ? ? ? ?                            



                          +---------------------                           



                          --+-------------------                           



                          --+-------------------                           



                          ------+| ?30-59 ? ? ?                           



                          ? ? ? ? ?| ?Stage                           



                          three ? ? ? ?| ? Stage                           



                          three ? ? ? ? ?                           



                          +---------------------                           



                          --+-------------------                           



                          --+-------------------                           



                          ------+| ?15-29 ? ? ?                           



                          ? ? ? ? ?| ?Stage four                           



                          ? ? ? ? | ? Stage four                           



                          ? ? ? ? ?                              



                          ?+--------------------                           



                          ---+------------------                           



                          ---+------------------                           



                          -------+| ?<15 (or                           



                          dialysis) ? ?| ?Stage                           



                          five ? ? ? ? | ? Stage                           



                          five ? ? ? ? ?                           



                          ?+--------------------                           



                          ---+------------------                           



                          ---+------------------                           



                          -------+ *Each stage                           



                          assumes the associated                           



                          GFR level has been in                           



                          effect for at least                           



                          three months. ?Stages                           



                          1 to 5, with or                           



                          without kidney                           



                          disease, indicate                           



                          chronic kidney                           



                          disease. Notes:                           



                          Determination of                           



                          stages one and two                           



                          (with eGFR                             



                          >59mL/min/1.73 m2)                           



                          requires estimation of                           



                          kidney damage for at                           



                          least three months as                           



                          defined by structural                           



                          or functional                           



                          abnormalities of the                           



                          kidney, manifested by                           



                          either:Pathological                           



                          abnormalities or                           



                          Markers of kidney                           



                          damage (including                           



                          abnormalities in the                           



                          composition of the                           



                          blood or urine or                           



                          abnormalities in                           



                          imaging tests).                           

 

             Lab Interpretation Abnormal                               



             (test code = 94667-7)                                        



[facility] and Screen - ONCE [id number] 00:12:19





             Test Item    Value        Reference Range Interpretation Comments

 

             ABO & RH (test code O Positive                             Performe

d at [facility]



             = 20)                                               Laboratory [facility]

ices [email or web address]

[address] [place] [address]

4Toll



                                                                 Free: [phone]

266CLIA



                                                                 No. [id number]

 

             IAT (test code = Negative                               Performed a

t [facility]



             1185)                                               Laboratory [facility] [email or web address]

[address] [place] [address]

4Toll



                                                                 Free: [phone]

266CLIA



                                                                 No. [id number]



[facility] and Screen - ONCE [id number] 00:12:19





             Test Item    Value        Reference Range Interpretation Comments

 

             ABO & RH (test code O Positive                             Performe

d at [facility]



             = 20)                                               Laboratory [facility]

ices [email or web address]

[address] [place] [address]

4Toll



                                                                 Free: [phone]

266CLIA



                                                                 No. [id number]

 

             IAT (test code = Negative                               Performed a

t [facility]



             1185)                                               Laboratory [facility]



                                                                 [facility]

[address] [place] [address]

4T[employer]



                                                                 Free: [phone]

266CLIA



                                                                 No. [id number]



[id number]





             Test Item    Value        Reference Range Interpretation Comments

 

             FERRITIN (test code = 12.5 ng/mL   18.0-464.0   L            



             1844182391)                                         

 

             RENETTA (test code = RENETTA) Biotin has been                           



                          reported to cause a                           



                          negative bias,                           



                          interpret results                           



                          relative to                            



                          patient's use of                           



                          biotin.                                

 

             Lab Interpretation (test Abnormal                               



             code = 19317-4)                                        



[facility] [id number] 00:02:09





             Test Item    Value        Reference Range Interpretation Comments

 

             FERRITIN (test code = 12.5 ng/mL   18.0-464.0   L            



             7545162657)                                         

 

             RENETTA (test code = RENETTA) Biotin has been                           



                          reported to cause a                           



                          negative bias,                           



                          interpret results                           



                          relative to                            



                          patient's use of                           



                          biotin.                                

 

             Lab Interpretation (test Abnormal                               



             code = 78855-3)                                        



[facility] / [id number] 22:57:36





             Test Item    Value        Reference Range Interpretation Comments

 

             PROTIME PATIENT (test              See_Comment  H             [Auto

mated message]



             code = 5964-2)                                        The system 

[employer]



                                                                 generated this 

result



                                                                 transmitted ref

erence



                                                                 range: 10.1 - 1

2.6



                                                                 Seconds. The



                                                                 reference range

 was



                                                                 not used to int

erpret



                                                                 this result as



                                                                 normal/abnormal

.

 

             INR (test code = 6301-6)                                        Nor

mal INR <1.1;



                                                                 Warfarin Therap

eutic



                                                                 range 2.0 to 3.

0 or



                                                                 2.5 to 3.5, dep

ending



                                                                 upon the indica

tions.

 

             Lab Interpretation (test Abnormal                               



             code = 58753-7)                                        



[facility] / [id number] 22:57:36





             Test Item    Value        Reference Range Interpretation Comments

 

             PROTIME PATIENT (test              See_Comment  H             [Auto

mated message]



             code = 5964-2)                                        The system 

[employer]



                                                                 generated this 

result



                                                                 transmitted ref

erence



                                                                 range: 10.1 - 1

2.6



                                                                 Seconds. The



                                                                 reference range

 was



                                                                 not used to int

erpret



                                                                 this result as



                                                                 normal/abnormal

.

 

             INR (test code = 6301-6)                                        Nor

mal INR <1.1;



                                                                 Warfarin Therap

eutic



                                                                 range 2.0 to 3.

0 or



                                                                 2.5 to 3.5, dep

ending



                                                                 upon the indica

tions.

 

             Lab Interpretation (test Abnormal                               



             code = 23777-0)                                        



[id number] 22:53:15





             Test Item    Value        Reference Range Interpretation Comments

 

             WBC (test code =              See_Comment  L             [Automated



             6690-2)                                             message] The sy

stem



                                                                 which generated



                                                                 this result



                                                                 transmitted



                                                                 reference range

:



                                                                 4.20 - 10.70



                                                                 10*3/?L. The



                                                                 reference range

 was



                                                                 not used to



                                                                 interpret this



                                                                 result as



                                                                 normal/abnormal

.

 

             RBC (test code =              See_Comment  L             [Automated



             789-8)                                              message] The sy

stem



                                                                 which generated



                                                                 this result



                                                                 transmitted



                                                                 reference range

:



                                                                 4.26 - 5.52



                                                                 10*6/?L. The



                                                                 reference range

 was



                                                                 not used to



                                                                 interpret this



                                                                 result as



                                                                 normal/abnormal

.

 

             HGB (test code = 6.9 g/dL     12.2-16.4    L            



             718-7)                                              

 

             HCT (test code = 22.3 %       38.4-49.3    L            



             4544-3)                                             

 

             MCV (test code = 80.8 fL      81.7-95.6    L            



             787-2)                                              

 

             MCH (test code = 25.0 pg      26.1-32.7    L            



             785-6)                                              

 

             MCHC (test code = 30.9 g/dL    31.2-35.0    L            



             786-4)                                              

 

             RDW-SD (test code = 50.6 fL      38.5-51.6                 



             77713-6)                                            

 

             RDW-CV (test code = 17.4 %       12.1-15.4    H            



             788-0)                                              

 

             PLT (test code =              See_Comment  LL            [Automated



             777-3)                                              message] The sy

stem



                                                                 which generated



                                                                 this result



                                                                 transmitted



                                                                 reference range

:



                                                                 150 - 328 10*3/

?L.



                                                                 The reference r

moose



                                                                 was not used to



                                                                 interpret this



                                                                 result as



                                                                 normal/abnormal

.

 

             MPV (test code =                                        Not Measure

d



             46594-8)                                            

 

             IPF % (test code = 11.6 %       1.2-10.7     H            Platelet 

count



             5060251882)                                         measured by



                                                                 fluorescence



                                                                 method.

 

             NRBC/100 WBC (test              See_Comment                [Automat

ed



             code = 6428966594)                                        message] 

The system



                                                                 which generated



                                                                 this result



                                                                 transmitted



                                                                 reference range

:



                                                                 0.0 - 10.0 /100



                                                                 WBCs. The refer

ence



                                                                 range was not u

sed



                                                                 to interpret th

is



                                                                 result as



                                                                 normal/abnormal

.

 

             NRBC x10^3 (test code <0.01        See_Comment                [Auto

mated



             = [id number])                                        message] The s

ystem



                                                                 which generated



                                                                 this result



                                                                 transmitted



                                                                 reference range

:



                                                                 10*3/?L. The



                                                                 reference range

 was



                                                                 not used to



                                                                 interpret this



                                                                 result as



                                                                 normal/abnormal

.

 

             GRAN MAT (NEUT) % 61.0 %                                 



             (test code = 770-8)                                        

 

             IMM GRAN % (test code 0.30 %                                 



             = 3849996469)                                        

 

             LYMPH % (test code = 21.0 %                                 



             736-9)                                              

 

             MONO % (test code = 13.8 %                                 



             5905-5)                                             

 

             EOS % (test code = 2.7 %                                  



             713-8)                                              

 

             BASO % (test code = 1.2 %                                  



             706-2)                                              

 

             GRAN MAT x10^3(ANC) 2.04 10*3/uL 1.99-6.95                 



             (test code =                                        



             2502323700)                                         

 

             IMM GRAN x10^3 (test <0.03        0.00-0.06                 



             code = 6066896574)                                        

 

             LYMPH x10^3 (test code 0.70 10*3/uL 1.09-3.23    L            



             = 731-0)                                            

 

             MONO x10^3 (test code 0.46 10*3/uL 0.36-1.02                 



             = 742-7)                                            

 

             EOS x10^3 (test code = 0.09 10*3/uL 0.06-0.53                 



             711-2)                                              

 

             BASO x10^3 (test code 0.04 10*3/uL 0.01-0.09                 



             = 704-7)                                            

 

             Lab Interpretation Abnormal                               



             (test code = 34716-2)                                        



[facility] [id number] 17:59:41





             Test Item    Value        Reference Range Interpretation Comments

 

             ABO & RH (test code O Positive                             Performe

d at [facility]



             = 20)                                               Laboratory [facility] [place], [place] [id number] 

Free:



                                                                 [phone]

A No.



                                                                 [id number]



[facility] [id number] 17:59:41





             Test Item    Value        Reference Range Interpretation Comments

 

             ABO & RH (test code O Positive                             Performe

d at [facility]



             = 20)                                               Laboratory [facility] [place], [place] [id number] 

Free:



                                                                 [phone]

A No.



                                                                 [id number]



[facility] - ONCE [id number] 17:58:57





             Test Item    Value        Reference Range Interpretation Comments

 

             ABO & RH (test code O Positive                             Performe

d at [facility]



             = 20)                                               Laboratory [facility] [place], [place] [id number] 

Free:



                                                                 [phone]

A No.



                                                                 [id number]

 

             IAT (test code = Negative                               Performed a

t [facility]



             1185)                                               Laboratory Serv

[facility]

[address] [place] [address]



                                                                 [id number] 

Free:



                                                                 [phone]

A No.



                                                                 [id number]



[facility] Hospital[facility]inalysis[facility] 17:44:03





             Test Item    Value        Reference Range Interpretation Comments

 

             APPEARANCE (test code = Clear        Clear                     



             4248636678)                                         

 

             COLOR (test code = Lorie        Yellow       A            



             6764567102)                                         

 

             PH (test code =              4.8-8.0                   



             9214914555)                                         

 

             SP GRAVITY (test code =              1.003-1.030  H            



             3123975780)                                         

 

             GLU U QUAL (test code = Normal       Normal                    



             2795963093)                                         

 

             BLOOD (test code = Negative     Negative                  



             9410632234)                                         

 

             KETONES (test code = 20 mg/dL     Negative     A            



             8055933885)                                         

 

             PROTEIN (test code = 30 mg/dL     Negative     A            



             2887-8)                                             

 

             UROBILIN (test code = 4.0 mg/dL    Normal       A            



             3187265259)                                         

 

             BILIRUBIN (test code = 2 mg/dL      Negative     A            



             7704750361)                                         

 

             NITRITE (test code = Negative     Negative                  



             3662930172)                                         

 

             LEUK NIKI (test code = Negative     Negative                  



             4578972526)                                         

 

             RBC/HPF (test code =              See_Comment                [Autom

ated message]



             7031477687)                                         The system [employer]



                                                                 generated this



                                                                 result transmit

michelle



                                                                 reference range

: 0 -



                                                                 3 HPF. The refe

rence



                                                                 range was not u

sed



                                                                 to interpret th

is



                                                                 result as



                                                                 normal/abnormal

.

 

             WBC/HPF (test code =              See_Comment                [Autom

ated message]



             1809189084)                                         The system [employer]



                                                                 generated this



                                                                 result transmit

michelle



                                                                 reference range

: 0 -



                                                                 5 HPF. The refe

rence



                                                                 range was not u

sed



                                                                 to interpret th

is



                                                                 result as



                                                                 normal/abnormal

.

 

             BACTERIA (test code = Few          Negative     A            



             0549814756)                                         

 

             MUCOUS (test code = Marked       Negative LPF A            



             1154799193)                                         

 

             SQ EPITH (test code =              HPF                       



             7165620507)                                         

 

             Ictotest (test code = Positive                               



             8885094392)                                         

 

             Lab Interpretation (test Abnormal                               



             code = 83897-9)                                        



[facility]ysis20[facility] 17:44:03





             Test Item    Value        Reference Range Interpretation Comments

 

             APPEARANCE (test code = Clear        Clear                     



             1463739557)                                         

 

             COLOR (test code = Lorie        Yellow       A            



             2980869855)                                         

 

             PH (test code =              4.8-8.0                   



             5017119374)                                         

 

             SP GRAVITY (test code =              1.003-1.030  H            



             5079535941)                                         

 

             GLU U QUAL (test code = Normal       Normal                    



             4215196448)                                         

 

             BLOOD (test code = Negative     Negative                  



             7545678614)                                         

 

             KETONES (test code = 20 mg/dL     Negative     A            



             0872967882)                                         

 

             PROTEIN (test code = 30 mg/dL     Negative     A            



             2887-8)                                             

 

             UROBILIN (test code = 4.0 mg/dL    Normal       A            



             7497996550)                                         

 

             BILIRUBIN (test code = 2 mg/dL      Negative     A            



             2779012129)                                         

 

             NITRITE (test code = Negative     Negative                  



             6224962559)                                         

 

             LEUK NIKI (test code = Negative     Negative                  



             5671087141)                                         

 

             RBC/HPF (test code =              See_Comment                [Autom

ated message]



             2230621230)                                         The system [employer]



                                                                 generated this



                                                                 result transmit

michelle



                                                                 reference range

: 0 -



                                                                 3 HPF. The refe

rence



                                                                 range was not u

sed



                                                                 to interpret th

is



                                                                 result as



                                                                 normal/abnormal

.

 

             WBC/HPF (test code =              See_Comment                [Autom

ated message]



             9963685193)                                         The system [employer]



                                                                 generated this



                                                                 result transmit

michelle



                                                                 reference range

: 0 -



                                                                 5 HPF. The refe

rence



                                                                 range was not u

sed



                                                                 to interpret th

is



                                                                 result as



                                                                 normal/abnormal

.

 

             BACTERIA (test code = Few          Negative     A            



             3422892256)                                         

 

             MUCOUS (test code = Marked       Negative LPF A            



             3062603947)                                         

 

             SQ EPITH (test code =              HPF                       



             6593529156)                                         

 

             Ictotest (test code = Positive                               



             4455327663)                                         

 

             Lab Interpretation (test Abnormal                               



             code = 06094-3)                                        



[facility] HospitalCT [facility] [id number] 
17:13:20 1. ?Cirrhosis, ascites, splenomegaly, and portal venous hypertension. 
Thereare large gastroesophageal varices. 2. Small bowel and colon wall 
thickening likely related to the portalvenous hypertension.Enterocolitis could 
have this appearance. There is nopneumatosis or pneumoperitoneum. RL: 1105 
Electronically signed by Tj Mesa MD at 2021 12:13 PMHISTORY: ?Abdominal 
distension Diverticulitis suspected COMPARISON:none TECHNIQUE:CT scan of the 
abdomen and pelvis performed. Contiguous axial CT imageswere obtained after 
administration of intravenous contrast. ?CT scan doneaccording to ALARA. [place]
hnical quality: Technical quality: adequate. FINDINGS: Moderate left pleural 
effusion seen. There isbibasilar atelectasis. Thereis no pericardial effusion. 
Liver is cirrhotic. Spleen is enlarged measuring 14.5 cm. There is moderateto 
large ascites. Gastroesophageal varices are large. Portal vein patent. There is 
no adrenal nodule. The kidneys demonstrate symmetric nephrograms.There is no 
hydronephrosis. Diffuse bowel wall thickening is present which may be due to the
portalvenous hypertension. Superimposed enterocolitis could have this 
appearance.There is no pneumatosis or pneumoperitoneum. Urinary bladder 
unremarkable. There is no destructive bony process. [facility], Radiant Results Inft 
User - 2021 12:14 PM CDTFormatting of this note might be different from 
the original.HISTORY: Abdominal distension Diverticulitis suspected 
COMPARISON:noneTECHNIQUE:CT scan of the abdomen and pelvis performed. Contiguous
axial CT imageswere obtained after administration of intravenous contrast. CT 
scan doneaccording to ALARA. Technical quality: Technical quality: 
adequate.FINDINGS:Moderate left pleural effusion seen. There is bibasilar 
atelectasis. Thereis no pericardial effusion.Liver is cirrhotic. Spleenis 
enlarged measuring 14.5 cm. There is moderateto large ascites. Gastroesophageal 
varices are large. Portal vein patent.There is no adrenal nodule. The kidneys 
demonstrate symmetric nephrograms.There is no hydronephrosis.Diffuse bowel wall 
thickening is present which may be due to the portalvenous hypertension. 
Superimposed enterocolitis could have this appearance.There is no pneumatosis or
pneumoperitoneum.Urinary bladder unremarkable.There is no destructive bony 
process.IMPRESSION1. Cirrhosis, ascites, splenomegaly, and portal venous 
hypertension. Thereare large gastroesophageal varices.2. Smallbowel and colon 
wall thickening likely related to the portalvenous hypertension. Enterocolitis 
couldhave this appearance. There is nopneumatosis or pneumoperitoneum.RL: 
1105Electronically signed by Tj Mesa MD at 2021 12:13 PMUnB[facility] IrvingCT [facility]BD[facility] PEL[facility] W [id number] 17:13:20 1. 
?Cirrhosis, ascites, splenomegaly, and portal venous hypertension. Thereare 
large gastroesophageal varices. 2. Small bowel and colon wall thickening likely 
related to the portalvenous hypertension.Enterocolitis could have this 
appearance. There is nopneumatosis or pneumoperitoneum. RL: 1105 Electronically 
signed by Tj Mesa MD at 2021 12:13 PMHISTORY: ?Abdominal distension 
Diverticulitis suspected COMPARISON:none TECHNIQUE:CT scan of the abdomen and 
pelvis performed. Contiguous axial CT imageswere obtained after administration 
of intravenous contrast. ?CT scan doneaccording to ALARA. Technical quality: 
Technical quality: adequate. FINDINGS: Moderate left pleural effusion seen. 
There isbibasilar atelectasis. Thereis no pericardial effusion. Liver is 
cirrhotic. Spleen is enlarged measuring 14.5 cm. There is moderateto large 
ascites. Gastroesophageal varices are large. Portal vein patent. There is no 
adrenal nodule. The kidneys demonstrate symmetric nephrograms.There is no 
hydronephrosis. Diffuse bowel wall thickening is present which may be due to the
portalvenous hypertension. Superimposed enterocolitis could have this 
appearance.There is no pneumatosis or pneumoperitoneum. Urinary bladder 
unremarkable. There is no destructive bony process. [facility], Radiant Results Inft 
User - 2021 12:14 PM CDTFormatting of this note might be different from 
the original.HISTORY: Abdominal distension Diverticulitis suspected 
COMPARISON:noneTECHNIQUE:CT scan of the abdomen and pelvis performed. Contiguous
axial CT imageswere obtained after administration of intravenous contrast. CT 
scan doneaccording to ALARA. Technical quality: Technical quality: 
adequate.FINDINGS:Moderate left pleural effusion seen. There is bibasilar 
atelectasis. Thereis no pericardial effusion.Liver is cirrhotic. Spleenis 
enlarged measuring 14.5 cm. There is moderateto large ascites. Gastroesophageal 
varices are large. Portal vein patent.There is no adrenal nodule. The kidneys 
demonstrate symmetric nephrograms.There is no hydronephrosis.Diffuse bowel wall 
thickening is present which may be due to the portalvenous hypertension. 
Superimposed enterocolitis could have this appearance.There is no pneumatosis or
pneumoperitoneum.Urinary bladder unremarkable.There is no destructive bony 
process.IMPRESSION1. Cirrhosis, ascites, splenomegaly, and portal venous 
hypertension. Thereare large gastroesophageal varices.2. Smallbowel and colon 
wall thickening likely related to the portalvenous hypertension. Enterocolitis 
couldhave this appearance. There is nopneumatosis or pneumoperitoneum.RL: 
1105Electronically signed by Tj Mesa MD at 2021 12:13 PMGeneral [id number] 16:30:57





             Test Item    Value        Reference Range Interpretation Comments

 

             WBC (test code =              See_Comment  L             [Automated



             6690-2)                                             message] The



                                                                 system which



                                                                 generated this



                                                                 result transmit

michelle



                                                                 reference range

:



                                                                 4.20 - 10.70



                                                                 10*3/?L. The



                                                                 reference range



                                                                 was not used to



                                                                 interpret this



                                                                 result as



                                                                 normal/abnormal

.

 

             RBC (test code =              See_Comment  L             [Automated



             789-8)                                              message] The



                                                                 system which



                                                                 generated this



                                                                 result transmit

michelle



                                                                 reference range

:



                                                                 4.26 - 5.52



                                                                 10*6/?L. The



                                                                 reference range



                                                                 was not used to



                                                                 interpret this



                                                                 result as



                                                                 normal/abnormal

.

 

             HGB (test code = 6.3 g/dL     12.2-16.4    L            



             718-7)                                              

 

             HCT (test code = 21.2 %       38.4-49.3    L            



             4544-3)                                             

 

             MCV (test code = 79.4 fL      81.7-95.6    L            



             787-2)                                              

 

             MCH (test code = 23.6 pg      26.1-32.7    L            



             785-6)                                              

 

             MCHC (test code = 29.7 g/dL    31.2-35.0    L            



             786-4)                                              

 

             RDW-SD (test code = 50.9 fL      38.5-51.6                 



             63189-8)                                            

 

             RDW-CV (test code = 17.6 %       12.1-15.4    H            



             788-0)                                              

 

             PLT (test code =              See_Comment  LL            [Automated



             777-3)                                              message] The



                                                                 system which



                                                                 generated this



                                                                 result transmit

michelle



                                                                 reference range

:



                                                                 150 - 328 10*3/

?L.



                                                                 The reference



                                                                 range was not u

sed



                                                                 to interpret th

is



                                                                 result as



                                                                 normal/abnormal

.

 

             MPV (test code =                                        Not Measure

d



             02989-0)                                            

 

             IPF % (test code = 8.5 %        1.2-10.7                  Platelet 

count



             [id number])                                         measured by



                                                                 fluorescence



                                                                 method.

 

             NRBC/100 WBC (test              See_Comment                [Automat

ed



             code = 4034160013)                                        message] 

The



                                                                 system which



                                                                 generated this



                                                                 result transmit

michelle



                                                                 reference range

:



                                                                 0.0 - 10.0 /100



                                                                 WBCs. The



                                                                 reference range



                                                                 was not used to



                                                                 interpret this



                                                                 result as



                                                                 normal/abnormal

.

 

             NRBC x10^3 (test code <0.01        See_Comment                [Auto

mated



             = [id number])                                        message] The



                                                                 system which



                                                                 generated this



                                                                 result transmit

michelle



                                                                 reference range

:



                                                                 10*3/?L. The



                                                                 reference range



                                                                 was not used to



                                                                 interpret this



                                                                 result as



                                                                 normal/abnormal

.

 

             GRAN MAT (NEUT) % 60.3 %                                 



             (test code = 770-8)                                        

 

             IMM GRAN % (test code 0.30 %                                 



             = 0661900285)                                        

 

             LYMPH % (test code = 20.2 %                                 



             736-9)                                              

 

             MONO % (test code = 17.0 %                                 



             5905-5)                                             

 

             EOS % (test code = 1.3 %                                  



             713-8)                                              

 

             BASO % (test code = 0.9 %                                  



             706-2)                                              

 

             GRAN MAT x10^3(ANC) 1.91 10*3/uL 1.99-6.95    L            



             (test code =                                        



             6641014087)                                         

 

             IMM GRAN x10^3 (test <0.03        0.00-0.06                 



             code = 3256057644)                                        

 

             LYMPH x10^3 (test 0.64 10*3/uL 1.09-3.23    L            



             code = 731-0)                                        

 

             MONO x10^3 (test code 0.54 10*3/uL 0.36-1.02                 



             = 742-7)                                            

 

             EOS x10^3 (test code 0.04 10*3/uL 0.06-0.53    L            



             = 711-2)                                            

 

             BASO x10^3 (test code 0.03 10*3/uL 0.01-0.09                 



             = 704-7)                                            

 

             POLYCHROMASIA (test 2+           See_Comment                [Automa

michelle



             code = 00696-3)                                        message] The



                                                                 system which



                                                                 generated this



                                                                 result transmit

michelle



                                                                 reference range

:



                                                                 2+. The referen

ce



                                                                 range was not u

sed



                                                                 to interpret th

is



                                                                 result as



                                                                 normal/abnormal

.

 

             ROULEAUX (test code = Present      See_Comment  A             [Auto

mated



             7797-4)                                             message] The



                                                                 system which



                                                                 generated this



                                                                 result transmit

michelle



                                                                 reference range

:



                                                                 (none). The



                                                                 reference range



                                                                 was not used to



                                                                 interpret this



                                                                 result as



                                                                 normal/abnormal

.

 

             TARGET CELLS (test 2+           See_Comment  A             [Automat

ed



             code = 90579-5)                                        message] The



                                                                 system which



                                                                 generated this



                                                                 result transmit

michelle



                                                                 reference range

:



                                                                 (none). The



                                                                 reference range



                                                                 was not used to



                                                                 interpret this



                                                                 result as



                                                                 normal/abnormal

.

 

             PLT ESTIMATE (test Critically   Normal       AA           



             code = 9317-9) Decreased                              

 

             Lab Interpretation Abnormal                               



             (test code = 39827-1)                                        



[facility] 
[facility]47[facility]50





             Test Item    Value        Reference Range Interpretation Comments

 

             SARS-CoV-2 Rapid ID NOW Not Detected Not Detected              



             (test code = 25521-5)                                        

 

             RENETTA (test code = RENETTA) ID NOW COVID-19 Assay                        

   



                          is an isothermal                           



                          nucleic acid                           



                          amplification test                           



                          intended for the                           



                          qualitative detection                           



                          of nucleic acid from                           



                          SARS-CoV-2 viral RNA                           



                          in nasopharyngeal (NP)                           



                          specimens. It is used                           



                          under Emergency Use                           



                          Authorization (EUA) by                           



                          FDA. The limit of                           



                          detection (LOD) of the                           



                          assay is 125 Genome                           



                          Equivalents/mL. A                           



                          positive result is                           



                          indicative of the                           



                          presence of SARS-CoV-2                           



                          RNA. ?Clinical                           



                          correlation with                           



                          patient history and                           



                          other diagnostic                           



                          information is                           



                          necessary to determine                           



                          patient infection                           



                          status. A negative                           



                          (Not Detected) result                           



                          does not preclude                           



                          SARS-CoV-2 infection.                           



                          In patients with                           



                          clinical symptoms and                           



                          other tests that are                           



                          consistent with                           



                          SARS-CoV-2 infection,                           



                          negative results                           



                          should be treated as                           



                          presumptive negative                           



                          and a new specimen                           



                          should be tested with                           



                          alternative PCR                           



                          molecular test.                           



                          Invalid: Please                           



                          collect a new specimen                           



                          for repeat patient                           



                          testing if clinically                           



                          indicated.                             

 

             Lab Interpretation Normal                                 



             (test code = 46886-9)                                        



[facility] 
15:47:50





             Test Item    Value        Reference Range Interpretation Comments

 

             SARS-CoV-2 Rapid ID NOW Not Detected Not Detected              



             (test code = 71534-5)                                        

 

             RENETTA (test code = RENETTA) ID NOW COVID-19 Assay                        

   



                          is an isothermal                           



                          nucleic acid                           



                          amplification test                           



                          intended for the                           



                          qualitative detection                           



                          of nucleic acid from                           



                          SARS-CoV-2 viral RNA                           



                          in nasopharyngeal (NP)                           



                          specimens. It is used                           



                          under Emergency Use                           



                          Authorization (EUA) by                           



                          FDA. The limit of                           



                          detection (LOD) of the                           



                          assay is 125 Genome                           



                          Equivalents/mL. A                           



                          positive result is                           



                          indicative of the                           



                          presence of SARS-CoV-2                           



                          RNA. ?Clinical                           



                          correlation with                           



                          patient history and                           



                          other diagnostic                           



                          information is                           



                          necessary to determine                           



                          patient infection                           



                          status. A negative                           



                          (Not Detected) result                           



                          does not preclude                           



                          SARS-CoV-2 infection.                           



                          In patients with                           



                          clinical symptoms and                           



                          other tests that are                           



                          consistent with                           



                          SARS-CoV-2 infection,                           



                          negative results                           



                          should be treated as                           



                          presumptive negative                           



                          and a new specimen                           



                          should be tested with                           



                          alternative PCR                           



                          molecular test.                           



                          Invalid: Please                           



                          collect a new specimen                           



                          for repeat patient                           



                          testing if clinically                           



                          indicated.                             

 

             Lab Interpretation Normal                                 



             (test code = 52443-6)                                        



[facility] -90-13 15:44:12





             Test Item    Value        Reference Range Interpretation Comments

 

             TROPONIN I (test 0.002 ng/mL  See_Comment                [Automated



             code = 9071631767)                                        message] 

The



                                                                 system which



                                                                 generated this



                                                                 result



                                                                 transmitted



                                                                 reference range

:



                                                                 <=0.034. The



                                                                 reference range



                                                                 was not used to



                                                                 interpret this



                                                                 result as



                                                                 normal/abnormal

.

 

             RENETTA (test code = Equal or Less than                           



             RENETTA)         0.034                                  



                          ng/ml---Normal                           



                          ?Note: Cardiac                           



                          troponin begins to                           



                          rise 3-4 hours                           



                          after the onset of                           



                          ischemia. Repeat                           



                          in 4-6 hours if                           



                          the sample was                           



                          drawn within 3-4                           



                          hours of the onset                           



                          of the symptom and                           



                          found normal.                           



                          Between 0.035 and                           



                          0.120 ng/mL---                           



                          Borderline.                            



                          Questionable                           



                          myocardial injury                           



                          or necrosis ?                           



                          ?Note: Serial                           



                          measurement may be                           



                          necessary to                           



                          confirm or exclude                           



                          the diagnosis of                           



                          myocardial injury                           



                          or necrosis;                           



                          Clinical                               



                          correlation                            



                          (symptoms, EKGs,                           



                          imaging studies,                           



                          and others)                            



                          required; Repeat                           



                          in 4-6 hours if                           



                          clinically                             



                          indicated. ? ? ? ?                           



                          Equal or Higher                           



                          than 0.121                             



                          ng/mL---Abnormal.                           



                          Myocardial Injury                           



                          or Necrosis Likely                           



                          ? ? ? ? Biotin has                           



                          been reported to                           



                          cause a negative                           



                          bias, interpret                           



                          results relative                           



                          to patient's use                           



                          of biotin. ? ? ? ?                           



                          ? ? ? ? ? ? ? ? ?                           



                          ? ? ? ? ? ? ? ? ?                           



                          ? ? ? ? ? ? ? ? ?                           



                          ? ? ? ? ? ? ? ? ?                           



                          ? ? ? ? ? ? ? ? ?                           



                          ?                                      

 

             Lab Interpretation Normal                                 



             (test code =                                        



             52729-7)                                            



[facility] -26-49 15:44:12





             Test Item    Value        Reference Range Interpretation Comments

 

             TROPONIN I (test 0.002 ng/mL  See_Comment                [Automated



             code = 2440932558)                                        message] 

The



                                                                 system which



                                                                 generated this



                                                                 result



                                                                 transmitted



                                                                 reference range

:



                                                                 <=0.034. The



                                                                 reference range



                                                                 was not used to



                                                                 interpret this



                                                                 result as



                                                                 normal/abnormal

.

 

             RENETTA (test code = Equal or Less than                           



             RENETTA)         0.034                                  



                          ng/ml---Normal                           



                          ?Note: Cardiac                           



                          troponin begins to                           



                          rise 3-4 hours                           



                          after the onset of                           



                          ischemia. Repeat                           



                          in 4-6 hours if                           



                          the sample was                           



                          drawn within 3-4                           



                          hours of the onset                           



                          of the symptom and                           



                          found normal.                           



                          Between 0.035 and                           



                          0.120 ng/mL---                           



                          Borderline.                            



                          Questionable                           



                          myocardial injury                           



                          or necrosis ?                           



                          ?Note: Serial                           



                          measurement may be                           



                          necessary to                           



                          confirm or exclude                           



                          the diagnosis of                           



                          myocardial injury                           



                          or necrosis;                           



                          Clinical                               



                          correlation                            



                          (symptoms, EKGs,                           



                          imaging studies,                           



                          and others)                            



                          required; Repeat                           



                          in 4-6 hours if                           



                          clinically                             



                          indicated. ? ? ? ?                           



                          Equal or Higher                           



                          than 0.121                             



                          ng/mL---Abnormal.                           



                          Myocardial Injury                           



                          or Necrosis Likely                           



                          ? ? ? ? Biotin has                           



                          been reported to                           



                          cause a negative                           



                          bias, interpret                           



                          results relative                           



                          to patient's use                           



                          of biotin. ? ? ? ?                           



                          ? ? ? ? ? ? ? ? ?                           



                          ? ? ? ? ? ? ? ? ?                           



                          ? ? ? ? ? ? ? ? ?                           



                          ? ? ? ? ? ? ? ? ?                           



                          ? ? ? ? ? ? ? ? ?                           



                          ?                                      

 

             Lab Interpretation Normal                                 



             (test code =                                        



             38994-4)                                            



[id number]:41:11





             Test Item    Value        Reference Range Interpretation Comments

 

             NT-proBNP (test code 162 pg/mL    See_Comment  H             [Autom

ated



             = [id number])                                        message] The



                                                                 system which



                                                                 generated this



                                                                 result



                                                                 transmitted



                                                                 reference range

:



                                                                 <=125. The



                                                                 reference range



                                                                 was not used to



                                                                 interpret this



                                                                 result as



                                                                 normal/abnormal

.

 

             RENETTA (test code = RENETTA) Biotin has been                           



                          reported to                            



                          cause a negative                           



                          bias, interpret                           



                          results relative                           



                          to patient's use                           



                          of biotin.                             

 

             Lab Interpretation Abnormal                               



             (test code = 36585-0)                                        



[id number]





             Test Item    Value        Reference Range Interpretation Comments

 

             NT-proBNP (test code 162 pg/mL    See_Comment  H             [Autom

ated



             = [id number])                                        message] The



                                                                 system which



                                                                 generated this



                                                                 result



                                                                 transmitted



                                                                 reference range

:



                                                                 <=125. The



                                                                 reference range



                                                                 was not used to



                                                                 interpret this



                                                                 result as



                                                                 normal/abnormal

.

 

             RENETTA (test code = RENETTA) Biotin has been                           



                          reported to                            



                          cause a negative                           



                          bias, interpret                           



                          results relative                           



                          to patient's use                           



                          of biotin.                             

 

             Lab Interpretation Abnormal                               



             (test code = 98687-1)                                        



[facility] 1 [id number] 15:35:38EXAM: XR CHEST 
1 VW HISTORY: SOB COMPARISON: None. FINDINGS: The lungs are poorly expanded and 
show changes of basilar subsegmentalatelectasis with suspicion of a small 
pleural effusion on the left. The midand upper lungs are clear. The heart and 
great vessels are normal. ? [facility], Radiant Results Inft User - 2021 10:36 
AM CDTFormatting of this note might be different from the original.EXAM: XR CH
EST 1 VWHISTORY: SOB COMPARISON: None.FINDINGS:The lungs are poorly expanded and
show changes of basilar subsegmentalatelectasis with suspicion of a small 
pleural effusion on the left. The midand upperlungs are clear. The heart and 
great vessels are normal.[facility] 1 View
2021 15:35:38EXAM: XR CHEST 1 VW HISTORY: SOB COMPARISON: None. FINDINGS: 
The lungs are poorly expanded and show changes of basilar 
subsegmentalatelectasis with suspicion of a small pleural effusion on the left. 
The midand upper lungs are clear. The heart and great vessels are normal. ? 
[facility], Radiant Results Inft User - 2021 10:36 AM CDTFormatting of this note
might be different from the original.EXAM: XR CHEST 1 VWHISTORY: SOB COMPARISON:
None.FINDINGS:The lungs are poorly expanded and show changes of basilar 
subsegmentalatelectasis with suspicion of a small pleural effusion on the left. 
The midand upperlungs are clear. The heart and great vessels are normal.
[facility]. METABOLIC PANEL (85584)2021 
15:32:50





             Test Item    Value        Reference Range Interpretation Comments

 

             NA (test code = 137 mmol/L   135-145                   



             [id number])                                         

 

             K (test code = 3.3 mmol/L   3.5-5.0      L            



             8384661863)                                         

 

             CL (test code = 103 mmol/L                       



             0381927050)                                         

 

             CO2 TOTAL (test code = 24 mmol/L    23-31                     



             9651446029)                                         

 

             AGAP (test code =              2-16                      



             6737147998)                                         

 

             BUN (test code = 6 mg/dL      7-23         L            



             9533792617)                                         

 

             GLUCOSE (test code = 155 mg/dL           H            



             7006533604)                                         

 

             CREATININE (test code = 0.43 mg/dL   0.60-1.25    L            



             1556508506)                                         

 

             TOTAL BILI (test code = 2.5 mg/dL    0.1-1.1      H            



             0146399805)                                         

 

             CALCIUM (test code = 7.6 mg/dL    8.6-10.6     L            



             7024309085)                                         

 

             T PROTEIN (test code = 6.7 g/dL     6.3-8.2                   



             1024861543)                                         

 

             ALBUMIN (test code = 2.8 g/dL     3.5-5.0      L            



             9446699965)                                         

 

             ALK PHOS (test code = 153 U/L             H            



             9561550796)                                         

 

             ALTv (test code = 22 U/L       5-50                      



             1742-6)                                             

 

             AST(SGOT) (test code = 73 U/L       13-40        H            



             1269278395)                                         

 

             eGFR (test code =              mL/min/1.73m2              



             6902875558)                                         

 

             RENETTA (test code = RENETTA) Association of                           



                          Glomerular Filtration                           



                          Rate (GFR) and Staging                           



                          of Kidney Disease*                           



                          +---------------------                           



                          --+-------------------                           



                          --+-------------------                           



                          ------+| GFR                           



                          (mL/min/1.73 m2) ?|                           



                          With Kidney Damage ?|                           



                          ?Without Kidney                           



                          Damage+---------------                           



                          --------+-------------                           



                          --------+-------------                           



                          ------------+| ?>90 ?                           



                          ? ? ? ? ? ? ? ?|                           



                          ?Stage one ? ? ? ? ?|                           



                          ? Normal ? ? ? ? ? ? ?                           



                          ?+--------------------                           



                          ---+------------------                           



                          ---+------------------                           



                          -------+| ?60-89 ? ? ?                           



                          ? ? ? ? ?| ?Stage two                           



                          ? ? ? ? ?| ? Decreased                           



                          GFR ? ? ? ?                            



                          +---------------------                           



                          --+-------------------                           



                          --+-------------------                           



                          ------+| ?30-59 ? ? ?                           



                          ? ? ? ? ?| ?Stage                           



                          three ? ? ? ?| ? Stage                           



                          three ? ? ? ? ?                           



                          +---------------------                           



                          --+-------------------                           



                          --+-------------------                           



                          ------+| ?15-29 ? ? ?                           



                          ? ? ? ? ?| ?Stage four                           



                          ? ? ? ? | ? Stage four                           



                          ? ? ? ? ?                              



                          ?+--------------------                           



                          ---+------------------                           



                          ---+------------------                           



                          -------+| ?<15 (or                           



                          dialysis) ? ?| ?Stage                           



                          five ? ? ? ? | ? Stage                           



                          five ? ? ? ? ?                           



                          ?+--------------------                           



                          ---+------------------                           



                          ---+------------------                           



                          -------+ *Each stage                           



                          assumes the associated                           



                          GFR level has been in                           



                          effect for at least                           



                          three months. ?Stages                           



                          1 to 5, with or                           



                          without kidney                           



                          disease, indicate                           



                          chronic kidney                           



                          disease. Notes:                           



                          Determination of                           



                          stages one and two                           



                          (with eGFR                             



                          >59mL/min/1.73 m2)                           



                          requires estimation of                           



                          kidney damage for at                           



                          least three months as                           



                          defined by structural                           



                          or functional                           



                          abnormalities of the                           



                          kidney, manifested by                           



                          either:Pathological                           



                          abnormalities or                           



                          Markers of kidney                           



                          damage (including                           



                          abnormalities in the                           



                          composition of the                           



                          blood or urine or                           



                          abnormalities in                           



                          imaging tests).                           

 

             Lab Interpretation Abnormal                               



             (test code = 07389-9)                                        



[id number]32[facility]10





             Test Item    Value        Reference Range Interpretation Comments

 

             LIPASE (test code = 4490838558) 350 U/L      0-220        H        

    

 

             Lab Interpretation (test code = Abnormal                           

    



             85472-6)                                            



[facility] [id number] 15:32:10





             Test Item    Value        Reference Range Interpretation Comments

 

             LIPASE (test code = 8905624948) 350 U/L      0-220        H        

    

 

             Lab Interpretation (test code = Abnormal                           

    



             80648-0)                                            



[facility]